# Patient Record
Sex: FEMALE | Race: WHITE | NOT HISPANIC OR LATINO | Employment: FULL TIME | URBAN - NONMETROPOLITAN AREA
[De-identification: names, ages, dates, MRNs, and addresses within clinical notes are randomized per-mention and may not be internally consistent; named-entity substitution may affect disease eponyms.]

---

## 2017-02-23 RX ORDER — TRAZODONE HYDROCHLORIDE 50 MG/1
TABLET ORAL
Qty: 60 TABLET | Refills: 0 | Status: SHIPPED | OUTPATIENT
Start: 2017-02-23 | End: 2017-04-17 | Stop reason: SDUPTHER

## 2017-03-14 RX ORDER — SUMATRIPTAN 50 MG/1
TABLET, FILM COATED ORAL
Qty: 9 TABLET | Refills: 0 | Status: SHIPPED | OUTPATIENT
Start: 2017-03-14 | End: 2017-04-17 | Stop reason: SDUPTHER

## 2017-03-17 RX ORDER — OSELTAMIVIR PHOSPHATE 75 MG/1
75 CAPSULE ORAL DAILY
Qty: 10 CAPSULE | Refills: 0 | Status: SHIPPED | OUTPATIENT
Start: 2017-03-17 | End: 2017-03-27

## 2017-04-17 RX ORDER — SUMATRIPTAN 50 MG/1
TABLET, FILM COATED ORAL
Qty: 9 TABLET | Refills: 0 | Status: SHIPPED | OUTPATIENT
Start: 2017-04-17 | End: 2017-06-18 | Stop reason: SDUPTHER

## 2017-04-17 RX ORDER — TRAZODONE HYDROCHLORIDE 50 MG/1
TABLET ORAL
Qty: 60 TABLET | Refills: 0 | Status: SHIPPED | OUTPATIENT
Start: 2017-04-17 | End: 2017-09-11 | Stop reason: SDUPTHER

## 2017-04-28 ENCOUNTER — OFFICE VISIT (OUTPATIENT)
Dept: FAMILY MEDICINE CLINIC | Facility: CLINIC | Age: 32
End: 2017-04-28

## 2017-04-28 VITALS
OXYGEN SATURATION: 100 % | HEIGHT: 57 IN | DIASTOLIC BLOOD PRESSURE: 60 MMHG | WEIGHT: 134 LBS | TEMPERATURE: 98.6 F | SYSTOLIC BLOOD PRESSURE: 110 MMHG | BODY MASS INDEX: 28.91 KG/M2

## 2017-04-28 DIAGNOSIS — G43.019 INTRACTABLE MIGRAINE WITHOUT AURA AND WITHOUT STATUS MIGRAINOSUS: Primary | ICD-10-CM

## 2017-04-28 DIAGNOSIS — J06.9 UPPER RESPIRATORY INFECTION, ACUTE: ICD-10-CM

## 2017-04-28 PROCEDURE — 99214 OFFICE O/P EST MOD 30 MIN: CPT | Performed by: NURSE PRACTITIONER

## 2017-04-28 RX ORDER — CEFUROXIME AXETIL 500 MG/1
500 TABLET ORAL 2 TIMES DAILY
Qty: 14 TABLET | Refills: 0 | Status: SHIPPED | OUTPATIENT
Start: 2017-04-28 | End: 2017-07-20

## 2017-04-28 NOTE — PROGRESS NOTES
Chief Complaint   Patient presents with   • Follow-up     6 month check up   • Cough   • Nasal Congestion     Subjective   Ellie Sissy Saini is a 31 y.o. female.     Cough   This is a new problem. The current episode started in the past 7 days. The problem has been gradually worsening. The problem occurs constantly. The cough is productive of sputum. Associated symptoms include headaches, myalgias, nasal congestion and postnasal drip. Pertinent negatives include no chest pain, chills, ear congestion, ear pain, eye redness, fever, heartburn, hemoptysis, rash, rhinorrhea, sore throat, shortness of breath, sweats, weight loss or wheezing. Nothing aggravates the symptoms. She has tried OTC cough suppressant for the symptoms. The treatment provided mild relief. There is no history of asthma, bronchiectasis, bronchitis, COPD, environmental allergies or pneumonia.   Migraine    This is a recurrent problem. The current episode started more than 1 year ago. The problem occurs daily. The problem has been rapidly worsening. The pain is located in the bilateral region. The pain does not radiate. The pain quality is not similar to prior headaches. The quality of the pain is described as boring, dull and throbbing. The pain is at a severity of 10/10. The pain is moderate. Associated symptoms include back pain. Pertinent negatives include no abdominal pain, abnormal behavior, anorexia, blurred vision, coughing, dizziness, drainage, ear pain, eye pain, eye redness, eye watering, facial sweating, fever, hearing loss, insomnia, loss of balance, muscle aches, nausea, neck pain, numbness, phonophobia, photophobia, rhinorrhea, scalp tenderness, seizures, sinus pressure, sore throat, swollen glands, tingling, tinnitus, visual change, vomiting, weakness or weight loss. Nothing aggravates the symptoms. She has tried acetaminophen, antidepressants, beta blockers, cold packs, ergotamines, injectable narcotics, NSAIDs, oral narcotics and  triptans for the symptoms. The treatment provided moderate relief.   Back Pain   This is a recurrent problem. The current episode started 1 to 4 weeks ago. The problem occurs constantly. The problem has been gradually worsening since onset. The pain is present in the lumbar spine. The pain is at a severity of 4/10. The pain is moderate. The pain is the same all the time. The symptoms are aggravated by bending. Stiffness is present all day. Associated symptoms include headaches. Pertinent negatives include no abdominal pain, bladder incontinence, bowel incontinence, chest pain, dysuria, fever, leg pain, numbness, paresis, paresthesias, pelvic pain, perianal numbness, tingling, weakness or weight loss. She has tried heat, home exercises, ice and muscle relaxant for the symptoms. The treatment provided mild relief.        The following portions of the patient's history were reviewed and updated as appropriate: allergies, current medications, past social history and problem list.    Review of Systems   Constitutional: Negative.  Negative for chills, fever and weight loss.   HENT: Positive for postnasal drip. Negative for ear pain, hearing loss, rhinorrhea, sinus pressure, sore throat and tinnitus.    Eyes: Negative.  Negative for blurred vision, photophobia, pain and redness.   Respiratory: Negative.  Negative for cough, hemoptysis, shortness of breath and wheezing.    Cardiovascular: Negative.  Negative for chest pain.   Gastrointestinal: Negative.  Negative for abdominal pain, anorexia, bowel incontinence, heartburn, nausea and vomiting.   Endocrine: Negative.    Genitourinary: Negative.  Negative for bladder incontinence, dysuria and pelvic pain.   Musculoskeletal: Positive for arthralgias, back pain, gait problem, joint swelling and myalgias. Negative for neck pain.   Skin: Negative for rash.   Allergic/Immunologic: Negative.  Negative for environmental allergies.   Neurological: Positive for light-headedness and  "headaches. Negative for dizziness, tingling, tremors, seizures, syncope, facial asymmetry, speech difficulty, weakness, numbness, paresthesias and loss of balance.   Hematological: Negative.    Psychiatric/Behavioral: Positive for sleep disturbance. The patient is nervous/anxious. The patient does not have insomnia.        Objective   /60  Temp 98.6 °F (37 °C) (Tympanic)   Ht 57\" (144.8 cm)  Wt 134 lb (60.8 kg)  SpO2 100%  BMI 29 kg/m2  Physical Exam   Constitutional: She is oriented to person, place, and time. She appears well-developed and well-nourished.   HENT:   Head: Normocephalic and atraumatic.   Thick pnd present -mid facial pain and pressure    Eyes: Conjunctivae and EOM are normal. Pupils are equal, round, and reactive to light.   Neck: Normal range of motion. Neck supple.   Cardiovascular: Normal rate, regular rhythm, normal heart sounds, intact distal pulses and normal pulses.  Exam reveals no gallop and no friction rub.    No murmur heard.  Pulmonary/Chest: Effort normal.   Abdominal: Soft. Bowel sounds are normal.   Genitourinary: Rectum normal. Pelvic exam was performed with patient supine. Uterus is not deviated, not enlarged, not fixed and not tender. Cervix exhibits no motion tenderness, no discharge and no friability. Right adnexum displays no mass. Left adnexum displays no mass.   Musculoskeletal: Normal range of motion. She exhibits tenderness.   Neurological: She is alert and oriented to person, place, and time. She has normal reflexes. She displays normal reflexes. No cranial nerve deficit. She exhibits normal muscle tone. Coordination normal.   Skin: Skin is warm and dry.   Psychiatric: She has a normal mood and affect.   Nursing note and vitals reviewed.      Assessment/Plan   Problem List Items Addressed This Visit        Cardiovascular and Mediastinum    Intractable migraine without aura and without status migrainosus - Primary    Relevant Medications    Topiramate ER (TROKENDI " XR) 200 MG capsule sustained-release 24 hr       Respiratory    Upper respiratory infection, acute    Relevant Medications    cefuroxime (CEFTIN) 500 MG tablet           New Medications Ordered This Visit   Medications   • Topiramate ER (TROKENDI XR) 200 MG capsule sustained-release 24 hr     Sig: Take 1 capsule by mouth Daily.     Dispense:  30 capsule     Refill:  11   • cefuroxime (CEFTIN) 500 MG tablet     Sig: Take 1 tablet by mouth 2 (Two) Times a Day.     Dispense:  14 tablet     Refill:  0

## 2017-06-19 RX ORDER — SUMATRIPTAN 50 MG/1
TABLET, FILM COATED ORAL
Qty: 9 TABLET | Refills: 5 | Status: SHIPPED | OUTPATIENT
Start: 2017-06-19 | End: 2018-03-02 | Stop reason: SDUPTHER

## 2017-07-20 ENCOUNTER — OFFICE VISIT (OUTPATIENT)
Dept: FAMILY MEDICINE CLINIC | Facility: CLINIC | Age: 32
End: 2017-07-20

## 2017-07-20 VITALS
WEIGHT: 126 LBS | SYSTOLIC BLOOD PRESSURE: 110 MMHG | DIASTOLIC BLOOD PRESSURE: 80 MMHG | HEIGHT: 57 IN | BODY MASS INDEX: 27.18 KG/M2

## 2017-07-20 DIAGNOSIS — M54.2 MUSCLE PAIN, CERVICAL: ICD-10-CM

## 2017-07-20 DIAGNOSIS — M54.2 NECK PAIN: Primary | ICD-10-CM

## 2017-07-20 DIAGNOSIS — G43.019 INTRACTABLE MIGRAINE WITHOUT AURA AND WITHOUT STATUS MIGRAINOSUS: ICD-10-CM

## 2017-07-20 PROCEDURE — 99213 OFFICE O/P EST LOW 20 MIN: CPT | Performed by: NURSE PRACTITIONER

## 2017-07-20 PROCEDURE — 96372 THER/PROPH/DIAG INJ SC/IM: CPT | Performed by: NURSE PRACTITIONER

## 2017-07-20 RX ORDER — TRIAMCINOLONE ACETONIDE 40 MG/ML
80 INJECTION, SUSPENSION INTRA-ARTICULAR; INTRAMUSCULAR ONCE
Status: COMPLETED | OUTPATIENT
Start: 2017-07-20 | End: 2017-07-20

## 2017-07-20 RX ORDER — BACLOFEN 10 MG/1
10 TABLET ORAL 2 TIMES DAILY
Qty: 90 TABLET | Refills: 5 | Status: SHIPPED | OUTPATIENT
Start: 2017-07-20 | End: 2018-07-02 | Stop reason: SDUPTHER

## 2017-07-20 RX ORDER — METHYLPREDNISOLONE 4 MG/1
TABLET ORAL
Qty: 21 TABLET | Refills: 0 | Status: SHIPPED | OUTPATIENT
Start: 2017-07-20 | End: 2017-08-25

## 2017-07-20 RX ADMIN — TRIAMCINOLONE ACETONIDE 80 MG: 40 INJECTION, SUSPENSION INTRA-ARTICULAR; INTRAMUSCULAR at 15:21

## 2017-07-20 NOTE — PROGRESS NOTES
Chief Complaint   Patient presents with   • Arm Pain     bilat arm pain x 1 month    • Back Pain     neck pain after stopping meds for a bit     Subjective   Ellie Saini is a 31 y.o. female.     Cough   This is a new problem. The current episode started in the past 7 days. The problem has been gradually worsening. The problem occurs constantly. The cough is productive of sputum. Associated symptoms include headaches, myalgias, nasal congestion and postnasal drip. Pertinent negatives include no chest pain, chills, ear congestion, ear pain, eye redness, fever, heartburn, hemoptysis, rash, rhinorrhea, sore throat, shortness of breath, sweats, weight loss or wheezing. Nothing aggravates the symptoms. She has tried OTC cough suppressant for the symptoms. The treatment provided mild relief. There is no history of asthma, bronchiectasis, bronchitis, COPD, environmental allergies or pneumonia.   Migraine    This is a recurrent problem. The current episode started more than 1 year ago. The problem occurs daily. The problem has been rapidly worsening. The pain is located in the bilateral region. The pain does not radiate. The pain quality is not similar to prior headaches. The quality of the pain is described as boring, dull and throbbing. The pain is at a severity of 10/10. The pain is moderate. Associated symptoms include back pain, neck pain, numbness and tingling. Pertinent negatives include no abdominal pain, abnormal behavior, anorexia, blurred vision, coughing, dizziness, drainage, ear pain, eye pain, eye redness, eye watering, facial sweating, fever, hearing loss, insomnia, loss of balance, muscle aches, nausea, phonophobia, photophobia, rhinorrhea, scalp tenderness, seizures, sinus pressure, sore throat, swollen glands, tinnitus, visual change, vomiting, weakness or weight loss. Nothing aggravates the symptoms. She has tried acetaminophen, antidepressants, beta blockers, cold packs, ergotamines, injectable  narcotics, NSAIDs, oral narcotics and triptans for the symptoms. The treatment provided moderate relief.   Back Pain   This is a recurrent problem. The current episode started 1 to 4 weeks ago. The problem occurs constantly. The problem has been gradually worsening since onset. The pain is present in the lumbar spine. The pain is at a severity of 4/10. The pain is moderate. The pain is the same all the time. The symptoms are aggravated by bending. Stiffness is present all day. Associated symptoms include dysuria, headaches, numbness, paresis, paresthesias and tingling. Pertinent negatives include no abdominal pain, bladder incontinence, bowel incontinence, chest pain, fever, leg pain, pelvic pain, perianal numbness, weakness or weight loss. She has tried heat, home exercises, ice and muscle relaxant for the symptoms. The treatment provided mild relief.        The following portions of the patient's history were reviewed and updated as appropriate: allergies, current medications, past social history and problem list.    Review of Systems   Constitutional: Negative.  Negative for chills, fever and weight loss.   HENT: Positive for postnasal drip. Negative for ear pain, hearing loss, rhinorrhea, sinus pressure, sore throat and tinnitus.    Eyes: Negative.  Negative for blurred vision, photophobia, pain and redness.   Respiratory: Negative.  Negative for cough, hemoptysis, shortness of breath and wheezing.    Cardiovascular: Negative.  Negative for chest pain, palpitations and leg swelling.   Gastrointestinal: Negative.  Negative for abdominal distention, abdominal pain, anorexia, bowel incontinence, heartburn, nausea and vomiting.   Endocrine: Negative.    Genitourinary: Positive for dysuria. Negative for bladder incontinence and pelvic pain.   Musculoskeletal: Positive for arthralgias, back pain, gait problem, joint swelling, myalgias, neck pain and neck stiffness.   Skin: Negative.  Negative for rash.  "  Allergic/Immunologic: Negative.  Negative for environmental allergies.   Neurological: Positive for tingling, light-headedness, numbness, headaches and paresthesias. Negative for dizziness, tremors, seizures, syncope, facial asymmetry, speech difficulty, weakness and loss of balance.   Hematological: Negative.    Psychiatric/Behavioral: Positive for sleep disturbance. The patient is nervous/anxious. The patient does not have insomnia.        Objective   /80  Ht 57\" (144.8 cm)  Wt 126 lb (57.2 kg)  BMI 27.27 kg/m2  Physical Exam   Constitutional: She is oriented to person, place, and time. She appears well-developed and well-nourished.   HENT:   Head: Normocephalic and atraumatic.   Thick pnd present -mid facial pain and pressure    Eyes: Conjunctivae and EOM are normal. Pupils are equal, round, and reactive to light.   Neck: Normal range of motion. Neck supple.   Cardiovascular: Normal rate, regular rhythm, normal heart sounds, intact distal pulses and normal pulses.  Exam reveals no gallop and no friction rub.    No murmur heard.  Pulmonary/Chest: Effort normal.   Abdominal: Soft. Bowel sounds are normal.   Genitourinary: Rectum normal. Pelvic exam was performed with patient supine. Uterus is not deviated, not enlarged, not fixed and not tender. Cervix exhibits no motion tenderness, no discharge and no friability. Right adnexum displays no mass. Left adnexum displays no mass.   Musculoskeletal: She exhibits tenderness.        Cervical back: She exhibits decreased range of motion, tenderness, bony tenderness, pain and spasm. She exhibits no swelling, no edema, no deformity, no laceration and normal pulse.        Back:    Neurological: She is alert and oriented to person, place, and time. She has normal reflexes. She displays normal reflexes. No cranial nerve deficit. She exhibits normal muscle tone. Coordination normal.   Skin: Skin is warm and dry.   Psychiatric: She has a normal mood and affect. "   Nursing note and vitals reviewed.     PROCEDURE: XR SPINE CERVICAL 2 OR 3 VW    VIEWS: 3    INDICATION:  Neck pain    COMPARISON: None    FINDINGS:    A cervical spine plain film examination was performed consisting   of five radiographic views.    There is no evidence of a fracture fracture or prevertebral soft  tissue swelling. Mild reversal of the normal lordotic curve is  present.     Intervertebral disc spaces are preserved. Facets are normally  aligned.             Impression             CONCLUSION:  Mild reversal of normal lordotic curve, likely  related to muscle spasm or positioning. Otherwise, grossly  negative examination for age.    (Please note:  If pain or symptoms persist beyond reasonable   expectations, a follow-up MRI examination is suggested, as is   deemed clinically appropriate.      Electronically signed by:  Vira Cornelius MD  7/20/2017 4:30 PM CDT  Workstation: [id number]              Specimen Collected: 07/20/17  3:41 PM Last Resulted           Assessment/Plan   Problem List Items Addressed This Visit        Cardiovascular and Mediastinum    Intractable migraine without aura and without status migrainosus    Relevant Medications    diclofenac (VOLTAREN) 50 MG EC tablet    baclofen (LIORESAL) 10 MG tablet       Nervous and Auditory    Neck pain - Primary    Relevant Medications    triamcinolone acetonide (KENALOG-40) injection 80 mg (Completed)    Other Relevant Orders    Ambulatory Referral to Physical Therapy    XR Spine Cervical 2 or 3 View (Completed)      Other Visit Diagnoses     Muscle pain, cervical               New Medications Ordered This Visit   Medications   • MethylPREDNISolone (MEDROL, ALFONSO,) 4 MG tablet     Sig: Take as directed on package instructions.     Dispense:  21 tablet     Refill:  0   • diclofenac (VOLTAREN) 50 MG EC tablet     Sig: Take 1 tablet by mouth 2 (Two) Times a Day.     Dispense:  60 tablet     Refill:  5   • baclofen (LIORESAL) 10 MG tablet     Sig: Take  1 tablet by mouth 2 (Two) Times a Day.     Dispense:  90 tablet     Refill:  5   • triamcinolone acetonide (KENALOG-40) injection 80 mg      refer to pt, meds as directed, diet discussed    It's not just what you eat, but when you eat  Eat breakfast, and eat smaller meals throughout the day. A healthy breakfast can jumpstart your metabolism, while eating small, healthy meals (rather than the standard three large meals) keeps your energy up.   Avoid eating at night. Try to eat dinner earlier and fast for 14-16 hours until breakfast the next morning. Studies suggest that eating only when you’re most active and giving your digestive system a long break each day may help to regulate weight.

## 2017-07-31 ENCOUNTER — HOSPITAL ENCOUNTER (OUTPATIENT)
Dept: PHYSICAL THERAPY | Facility: HOSPITAL | Age: 32
Setting detail: THERAPIES SERIES
Discharge: HOME | End: 2017-07-31

## 2017-07-31 DIAGNOSIS — M54.2 NECK PAIN: Primary | ICD-10-CM

## 2017-07-31 PROCEDURE — 97161 PT EVAL LOW COMPLEX 20 MIN: CPT | Performed by: PHYSICAL THERAPIST

## 2017-08-08 ENCOUNTER — HOSPITAL ENCOUNTER (OUTPATIENT)
Dept: PHYSICAL THERAPY | Facility: HOSPITAL | Age: 32
Setting detail: THERAPIES SERIES
Discharge: HOME | End: 2017-08-08

## 2017-08-08 DIAGNOSIS — M54.2 NECK PAIN: Primary | ICD-10-CM

## 2017-08-08 PROCEDURE — 97140 MANUAL THERAPY 1/> REGIONS: CPT

## 2017-08-08 PROCEDURE — 97110 THERAPEUTIC EXERCISES: CPT

## 2017-08-08 PROCEDURE — G0283 ELEC STIM OTHER THAN WOUND: HCPCS

## 2017-08-08 NOTE — PROGRESS NOTES
Outpatient Physical Therapy Ortho Treatment Note   Jessy Soham     Patient Name: Ellie Saini  : 1985  MRN: [id number]  Today's Date: 2017      Visit Date: 2017    Subjective Improvement:     0%  Attendance: 2/  Approved:    8 visits ( thru )       MD follow up:   TBD         date:  17       Visit Dx:    ICD-10-CM ICD-9-CM   1. Neck pain M54.2 723.1       Patient Active Problem List   Diagnosis   • Bilateral low back pain with right-sided sciatica   • Intractable migraine without aura and without status migrainosus   • Upper respiratory infection, acute   • Neck pain        Past Medical History:   Diagnosis Date   • Acute bronchitis    • Acute maxillary sinusitis    • Acute otitis media    • Acute pharyngitis    • Allergic rhinitis    • Breast lump    • Cramp in limb     Cramp in lower limb - bilateral      • Hormone replacement therapy (HRT)     H/O: hormone replacement (HRT)   • Insomnia    • Lymphadenopathy    • Malaise     Other malaise   • Migraine    • Postoperative visit    • Upper respiratory infection         Past Surgical History:   Procedure Laterality Date   •  SECTION      x2   • COLONOSCOPY  2008   • DIAGNOSTIC LAPAROSCOPY  2014   • LAPAROSCOPIC CHOLECYSTECTOMY  2007   • PAP SMEAR  2009   • PROCEDURE GENERIC CONVERTED  2013    REMOVE INTRAUTERINE DEVICE    • TOTAL ABDOMINAL HYSTERECTOMY WITH SALPINGO OOPHORECTOMY  2014             PT Ortho       17 1100    Subjective Comments    Subjective Comments Pt reports burning pain in neck & numbness L UE.  Pain gets worse with sitting or lying down.  -TM    Precautions and Contraindications    Precautions/Limitations no known precautions/limitations  -TM    Precautions Monitor BP PRN  -TM    Subjective Pain    Able to rate subjective pain? yes  -TM    Pre-Treatment Pain Level 4  -TM    Posture/Observations    Posture/Observations Comments Pt presents with  forward head & rounded shoulders.  -TM    Shoulder Girdle Palpation    Upper Trap Left:;Trigger point;Guarded/taut  -TM    Levator Scapula Bilateral:;Tender  -TM      User Key  (r) = Recorded By, (t) = Taken By, (c) = Cosigned By    Initials Name Provider Type     Mariana Tejada PTA Physical Therapy Assistant                            PT Assessment/Plan       08/08/17 1100       PT Assessment    Functional Limitations Performance in leisure activities;Performance in self-care ADL;Limitations in functional capacity and performance  -TM     Impairments Impaired muscle endurance;Muscle strength;Pain;Peripheral nerve integrity;Range of motion;Posture;Sensation  -TM     Assessment Comments Pt tolerated manual RX better today.  Some report of decreased radiculopathy in L UE during distraction of CS.  -TM     Rehab Potential Good  -TM     Patient/caregiver participated in establishment of treatment plan and goals Yes  -TM     Patient would benefit from skilled therapy intervention Yes  -TM     PT Plan    PT Frequency 2x/week  -TM     Predicted Duration of Therapy Intervention (days/wks) 3-4 weeks  -TM     PT Plan Comments scap squeezes, no money  -TM       User Key  (r) = Recorded By, (t) = Taken By, (c) = Cosigned By    Initials Name Provider Type     Mariana Tejada PTA Physical Therapy Assistant                Modalities       08/08/17 1100          Moist Heat    MH Applied Yes  -TM      Location cervical  -TM      Rx Minutes Other:   20  -TM      MH S/P Rx Yes  -TM      ELECTRICAL STIMULATION    Attended/Unattended Unattended  -TM      Stimulation Type IFC  -TM      Max mAmp 14  -TM      Location/Electrode Placement/Other cervical w/MHP  -TM      Rx Minutes 20 mins  -TM        User Key  (r) = Recorded By, (t) = Taken By, (c) = Cosigned By    Initials Name Provider Type     Mariana Tejada PTA Physical Therapy Assistant                Exercises       08/08/17 1100          Subjective Comments     Subjective Comments Pt reports burning pain in neck & numbness L UE.  Pain gets worse with sitting or lying down.  -TM      Subjective Pain    Able to rate subjective pain? yes  -TM      Pre-Treatment Pain Level 4  -TM      Exercise 1    Exercise Name 1 chin tucks  -TM      Reps 1 15  -TM      Time (Seconds) 1 5  -TM      Exercise 2    Exercise Name 2 swim glides  -TM      Reps 2 15  -TM      Exercise 3    Exercise Name 3 AROM CS rotation  -TM      Reps 3 10  -TM      Exercise 4    Exercise Name 4 supine wand AAROM shoulder flex  -TM      Reps 4 15  -TM      Exercise 5    Exercise Name 5 supine wand chest press  -TM      Reps 5 15  -TM        User Key  (r) = Recorded By, (t) = Taken By, (c) = Cosigned By    Initials Name Provider Type    TM Mariana Tejada PTA Physical Therapy Assistant                        Manual Rx (last 36 hours)      Manual Treatments       08/08/17 1100          Manual Rx 1    Manual Rx 1 Location bilateral UT/levator  -TM      Manual Rx 1 Type STM  -TM      Manual Rx 1 Duration 5 min  -TM      Manual Rx 2    Manual Rx 2 Location cervical  -TM      Manual Rx 2 Type distraction, suboccipital release  -TM      Manual Rx 2 Duration 8 min  -TM        User Key  (r) = Recorded By, (t) = Taken By, (c) = Cosigned By    Initials Name Provider Type    TM Mariana Tejada PTA Physical Therapy Assistant                PT OP Goals       08/08/17 1100       PT Short Term Goals    STG Date to Achieve 08/21/17  -TM     STG 1 Independent in HEP   -TM     STG 1 Progress Ongoing  -TM     STG 2 No pain with AROM of c-spine   -TM     STG 2 Progress Ongoing  -TM     STG 3 Centralize radicular symptoms   -TM     STG 3 Progress Ongoing  -TM     STG 4 NDI 45% or better  -TM     STG 4 Progress Ongoing  -TM       User Key  (r) = Recorded By, (t) = Taken By, (c) = Cosigned By    Initials Name Provider Type    TM Mariana Tejada PTA Physical Therapy Assistant                Therapy Education       08/08/17  1100          Therapy Education    Education Details HEP: chin tucks, swim glides  -TM      Given HEP  -TM      Program New  -TM      How Provided Demonstration;Written  -TM      Provided to Patient  -TM      Level of Understanding Teach back education performed  -TM        User Key  (r) = Recorded By, (t) = Taken By, (c) = Cosigned By    Initials Name Provider Type    TM Mariana Tejada PTA Physical Therapy Assistant                Time Calculation:   Start Time: 1100  Stop Time: 1158  Time Calculation (min): 58 min  Total Timed Code Minutes- PT: 38 minute(s)    Therapy Charges for Today     Code Description Service Date Service Provider Modifiers Qty    05546665068 HC PT MANUAL THERAPY EA 15 MIN 8/8/2017 Mariana Tejada, PTA GP 1    42929475584 HC PT THER PROC EA 15 MIN 8/8/2017 Mariana Tejada, MEDINA GP 2    [id number] HC PT ELECTRICAL STIM UNATTENDED 8/8/2017 Mariana Tejada, PTA  1     PA TENS SUPPL 2 LEAD PER MONTH 8/8/2017 Mariana Tejada PTA  1                    Mariana Tejada PTA  8/8/2017

## 2017-08-15 ENCOUNTER — HOSPITAL ENCOUNTER (OUTPATIENT)
Dept: PHYSICAL THERAPY | Facility: HOSPITAL | Age: 32
Setting detail: THERAPIES SERIES
Discharge: HOME | End: 2017-08-15

## 2017-08-15 DIAGNOSIS — M54.2 NECK PAIN: Primary | ICD-10-CM

## 2017-08-15 PROCEDURE — G0283 ELEC STIM OTHER THAN WOUND: HCPCS

## 2017-08-15 PROCEDURE — 97110 THERAPEUTIC EXERCISES: CPT

## 2017-08-15 PROCEDURE — 97140 MANUAL THERAPY 1/> REGIONS: CPT

## 2017-08-15 NOTE — PROGRESS NOTES
Outpatient Physical Therapy Ortho Treatment Note   Soham Jim[facility]nez     Patient Name: Ellie Saini  : 1985  MRN: [id number]  Today's Date: 8/15/2017      Visit Date: 08/15/2017    Subjective Improvement:  0%     Attendance: 3/3  Approved:    8 visits ( thru )       MD follow up:   TBD         date:   17      Visit Dx:    ICD-10-CM ICD-9-CM   1. Neck pain M54.2 723.1       Patient Active Problem List   Diagnosis   • Bilateral low back pain with right-sided sciatica   • Intractable migraine without aura and without status migrainosus   • Upper respiratory infection, acute   • Neck pain        Past Medical History:   Diagnosis Date   • Acute bronchitis    • Acute maxillary sinusitis    • Acute otitis media    • Acute pharyngitis    • Allergic rhinitis    • Breast lump    • Cramp in limb     Cramp in lower limb - bilateral      • Hormone replacement therapy (HRT)     H/O: hormone replacement (HRT)   • Insomnia    • Lymphadenopathy    • Malaise     Other malaise   • Migraine    • Postoperative visit    • Upper respiratory infection         Past Surgical History:   Procedure Laterality Date   •  SECTION      x2   • COLONOSCOPY  2008   • DIAGNOSTIC LAPAROSCOPY  2014   • LAPAROSCOPIC CHOLECYSTECTOMY  2007   • PAP SMEAR  2009   • PROCEDURE GENERIC CONVERTED  2013    REMOVE INTRAUTERINE DEVICE    • TOTAL ABDOMINAL HYSTERECTOMY WITH SALPINGO OOPHORECTOMY  2014             PT Ortho       08/15/17 1000    Precautions and Contraindications    Precautions/Limitations no known precautions/limitations  -TM    Precautions Monitor BP PRN  -TM    Cervical Palpation    Cervical Palpation- Location? Occiput;Suboccipital;Spinous process;Upper traps  -TM    Occiput Bilateral:;Tender  -TM    Suboccipital Bilateral:;Tender;Guarded/taut  -TM    Spinous Process Bilateral:;Tender  -TM    Upper Traps Bilateral:;Trigger point  -TM      User Key  (r) = Recorded  By, (t) = Taken By, (c) = Cosigned By    Initials Name Provider Type     Mariana Tejada PTA Physical Therapy Assistant                            PT Assessment/Plan       08/15/17 1000       PT Assessment    Functional Limitations Performance in leisure activities;Performance in self-care ADL;Limitations in functional capacity and performance  -TM     Impairments Impaired muscle endurance;Muscle strength;Pain;Peripheral nerve integrity;Range of motion;Posture;Sensation  -TM     Assessment Comments Pt didn't tolerate manual RX well today.  Very tender at spinous processes C3-7.  Pt c/o increased radicular symptoms with A/P glides CS.  -TM     Rehab Potential Good  -TM     Patient/caregiver participated in establishment of treatment plan and goals Yes  -TM     Patient would benefit from skilled therapy intervention Yes  -TM     PT Plan    PT Frequency 2x/week  -TM     Predicted Duration of Therapy Intervention (days/wks) 3-4 weeks  -TM     PT Plan Comments Cont as tolerated.  -TM       User Key  (r) = Recorded By, (t) = Taken By, (c) = Cosigned By    Initials Name Provider Type     Mariana Tejada PTA Physical Therapy Assistant                Modalities       08/15/17 1000          Moist Heat    MH Applied Yes  -TM      Location cervical  -TM      Rx Minutes Other:   20 min  -TM      MH S/P Rx Yes  -TM      ELECTRICAL STIMULATION    Attended/Unattended Unattended  -TM      Stimulation Type IFC  -TM      Max mAmp 11  -TM      Location/Electrode Placement/Other cervical w/MHP  -TM      Rx Minutes 20 mins  -TM        User Key  (r) = Recorded By, (t) = Taken By, (c) = Cosigned By    Initials Name Provider Type     Mariana Tejada PTA Physical Therapy Assistant                Exercises       08/15/17 1000          Subjective Comments    Subjective Comments Pt reports numbenss in L UE & R side of face intermittantly.  -TM      Subjective Pain    Able to rate subjective pain? yes  -TM      Pre-Treatment  Pain Level 3  -TM      Exercise 1    Exercise Name 1 UT stretch  -TM      Reps 1 2  -TM      Time (Seconds) 1 30  -TM      Exercise 2    Exercise Name 2 chin tucks  -TM      Sets 2 2  -TM      Reps 2 10  -TM      Exercise 3    Exercise Name 3 swim glides  -TM      Sets 3 1  -TM      Reps 3 15  -TM      Exercise 4    Exercise Name 4 scap squeezes  -TM      Sets 4 2  -TM      Reps 4 10  -TM      Exercise 5    Exercise Name 5 supine wand shoulder flex  -TM      Sets 5 2  -TM      Reps 5 10  -TM        User Key  (r) = Recorded By, (t) = Taken By, (c) = Cosigned By    Initials Name Provider Type    TM Mariana Tejada PTA Physical Therapy Assistant                        Manual Rx (last 36 hours)      Manual Treatments       08/15/17 0900          Manual Rx 1    Manual Rx 1 Location bilateral UT/levator  -TM      Manual Rx 1 Type STM  -TM      Manual Rx 1 Duration 5 min  -TM      Manual Rx 2    Manual Rx 2 Location cervical  -TM      Manual Rx 2 Type distraction, suboccipital release, A/P glides  -TM      Manual Rx 2 Duration 5min  -TM        User Key  (r) = Recorded By, (t) = Taken By, (c) = Cosigned By    Initials Name Provider Type    TM Mariana Tejada PTA Physical Therapy Assistant                PT OP Goals       08/15/17 1000       PT Short Term Goals    STG Date to Achieve 08/21/17  -TM     STG 1 Independent in HEP   -TM     STG 1 Progress Ongoing  -TM     STG 2 No pain with AROM of c-spine   -TM     STG 2 Progress Ongoing  -TM     STG 3 Centralize radicular symptoms   -TM     STG 3 Progress Ongoing  -TM     STG 4 NDI 45% or better  -TM     STG 4 Progress Ongoing  -TM       User Key  (r) = Recorded By, (t) = Taken By, (c) = Cosigned By    Initials Name Provider Type    TM Mariana Tejada PTA Physical Therapy Assistant                    Time Calculation:   Start Time: 1012  Stop Time: 1112  Time Calculation (min): 60 min  Total Timed Code Minutes- PT: 40 minute(s)    Therapy Charges for Today     Code  Description Service Date Service Provider Modifiers Qty    19464181229 HC PT ELECTRICAL STIM UNATTENDED 8/15/2017 Mariana Tejada, PTA  1    41246495505 HC PT MANUAL THERAPY EA 15 MIN 8/15/2017 Mariana Tejada, PTA GP 1    52540926962 HC PT THER PROC EA 15 MIN 8/15/2017 Mariana Tejada, PTA GP 2                    Mariana Tejada, PTA  8/15/2017

## 2017-08-24 ENCOUNTER — HOSPITAL ENCOUNTER (OUTPATIENT)
Dept: PHYSICAL THERAPY | Facility: HOSPITAL | Age: 32
Setting detail: THERAPIES SERIES
Discharge: HOME | End: 2017-08-24

## 2017-08-24 DIAGNOSIS — M54.2 NECK PAIN: Primary | ICD-10-CM

## 2017-08-24 PROCEDURE — 97140 MANUAL THERAPY 1/> REGIONS: CPT | Performed by: PHYSICAL THERAPIST

## 2017-08-24 PROCEDURE — 97110 THERAPEUTIC EXERCISES: CPT

## 2017-08-24 NOTE — PROGRESS NOTES
Outpatient Physical Therapy Ortho Progress Note  [facility]     Patient Name: Ellie Saini  : 1985  MRN: [id number]  Today's Date: 2017      Visit Date: 2017  Attendance  8 approved exp   Move exp date for continued PT. No MD follow up.  No improvement.    Visit Dx:    ICD-10-CM ICD-9-CM   1. Neck pain M54.2 723.1       Patient Active Problem List   Diagnosis   • Bilateral low back pain with right-sided sciatica   • Intractable migraine without aura and without status migrainosus   • Upper respiratory infection, acute   • Neck pain        Past Medical History:   Diagnosis Date   • Acute bronchitis    • Acute maxillary sinusitis    • Acute otitis media    • Acute pharyngitis    • Allergic rhinitis    • Breast lump    • Cramp in limb     Cramp in lower limb - bilateral      • Hormone replacement therapy (HRT)     H/O: hormone replacement (HRT)   • Insomnia    • Lymphadenopathy    • Malaise     Other malaise   • Migraine    • Postoperative visit    • Upper respiratory infection         Past Surgical History:   Procedure Laterality Date   •  SECTION      x2   • COLONOSCOPY  2008   • DIAGNOSTIC LAPAROSCOPY  2014   • LAPAROSCOPIC CHOLECYSTECTOMY  2007   • PAP SMEAR  2009   • PROCEDURE GENERIC CONVERTED  2013    REMOVE INTRAUTERINE DEVICE    • TOTAL ABDOMINAL HYSTERECTOMY WITH SALPINGO OOPHORECTOMY  2014             PT Ortho       17 1300    Subjective Comments    Subjective Comments Pt reports symptoms getting worse past week. Has neckk pain, headache & numbness in B arms.    -TM    Precautions and Contraindications    Precautions/Limitations no known precautions/limitations  -TM    Precautions Monitor BP PRN  -TM    Subjective Pain    Able to rate subjective pain? yes  -TM    Pre-Treatment Pain Level 8  -TM      User Key  (r) = Recorded By, (t) = Taken By, (c) = Cosigned By    Initials Name Provider Type    TM Mariana Downey  MEDINA Tejada Physical Therapy Assistant        Painful cervical motion in all planes.  Reactive to palpation in cervical and thoraco/scapular region. Does not tolerate resisitence to MMT UE strength.  Continues to report numbness.                    PT Assessment/Plan       08/24/17 1428 08/24/17 1300    PT Assessment    Functional Limitations Limitation in home management;Performance in self-care ADL;Limitations in functional capacity and performance  -DD Performance in leisure activities;Performance in self-care ADL;Limitations in functional capacity and performance  -TM    Impairments  Impaired muscle endurance;Muscle strength;Pain;Peripheral nerve integrity;Range of motion;Posture;Sensation  -TM    Assessment Comments Patient too reactive for manual stretchning and MFR.  Will alter treatment plan for the next couple of treatments  -DD Pt's symptoms are getting worse.  Very reactive to touch of cervical spine.  -TM    Please refer to paper survey for additional self-reported information No  -DD     Rehab Potential Good  -DD Good  -TM    Patient/caregiver participated in establishment of treatment plan and goals Yes  -DD Yes  -TM    Patient would benefit from skilled therapy intervention Yes  -DD Yes  -TM    PT Plan    PT Frequency 2x/week  -DD 2x/week  -TM    Predicted Duration of Therapy Intervention (days/wks) 3 weeks  -DD 3-4 weeks  -TM    PT Plan Comments Continue to schedule for TPDN as therapist available.  Patient not available on Thursday next week.  Trial of cervical traction 10 mins.  Neural tension glides and stretches.  Postural education  ICE  -DD       User Key  (r) = Recorded By, (t) = Taken By, (c) = Cosigned By    Initials Name Provider Type    TM Mariana Tejada PTA Physical Therapy Assistant    DD Maryuri Noguera, PT Physical Therapist                    Exercises       08/24/17 1300          Subjective Comments    Subjective Comments Pt reports symptoms getting worse past week. Has  neckk pain, headache & numbness in B arms.    -TM      Subjective Pain    Able to rate subjective pain? yes  -TM      Pre-Treatment Pain Level 8  -TM      Exercise 1    Exercise Name 1 UT stretch  -TM      Reps 1 2  -TM      Time (Seconds) 1 30  -TM      Exercise 2    Exercise Name 2 swim glides  -TM      Sets 2 1  -TM      Reps 2 15  -TM      Exercise 3    Exercise Name 3 chin tucks  -TM      Sets 3 2  -TM      Reps 3 10  -TM      Exercise 4    Exercise Name 4 scap squeezes  -TM      Sets 4 2  -TM      Reps 4 10  -TM      Exercise 5    Exercise Name 5 CS isometric flex, SB with ball at wall  -TM      Sets 5 1  -TM      Reps 5 10  -TM      Time (Seconds) 5 5  -TM      Exercise 6    Exercise Name 6 supine wand shoulder flex  -TM      Sets 6 2  -TM      Reps 6 10  -TM        User Key  (r) = Recorded By, (t) = Taken By, (c) = Cosigned By    Initials Name Provider Type    TM Mariana Tejada, PTA Physical Therapy Assistant                        Manual Rx (last 36 hours)      Manual Treatments       08/24/17 1300          Manual Rx 1    Manual Rx 1 Location Cervical/ Upper thoracic paraspinals  -DD      Manual Rx 1 Type TPDN  -DD      Manual Rx 1 Duration 12  -DD      Manual Rx 2    Manual Rx 2 Location bilateral scapulae  -DD      Manual Rx 2 Type TPDN  -DD      Manual Rx 2 Duration 10  -DD      Manual Rx 3    Manual Rx 3 Location scapular glides/ AP thoracic  -DD      Manual Rx 3 Duration 2  -DD        User Key  (r) = Recorded By, (t) = Taken By, (c) = Cosigned By    Initials Name Provider Type    DD Maryuri Noguera, PT Physical Therapist                PT OP Goals       08/24/17 1738 08/24/17 1300    PT Short Term Goals    STG Date to Achieve  08/21/17  -DD    STG 1  Independent in HEP   -DD    STG 1 Progress  Ongoing  -DD    STG 2  No pain with AROM of c-spine   -DD    STG 2 Progress  Ongoing  -DD    STG 3  Centralize radicular symptoms   -DD    STG 3 Progress  Ongoing  -DD    STG 4  NDI 45% or better  -DD     STG 4 Progress  Ongoing  -DD    STG 5  Patient to report decreased HA  -DD    Time Calculation    PT Goal Re-Cert Due Date 09/14/17  -DD       User Key  (r) = Recorded By, (t) = Taken By, (c) = Cosigned By    Initials Name Provider Type    DD Maryuri Noguera, PT Physical Therapist                    Outcome Measures       08/24/17 1300          Neck Disability Index    Section 1 - Pain Intensity 3  -TM      Section 2 - Personal Care 1  -TM      Section 3 - Lifting 3  -TM      Section 4 - Work 4  -TM      Section 5 - Headaches 5  -TM      Section 6 - Concentration 3  -TM      Section 7 - Sleeping 3  -TM      Section 8 - Driving 3  -TM      Section 9 - Reading 4  -TM      Section 10 - Recreation 4  -TM      Neck Disability Index Score 33  -TM        User Key  (r) = Recorded By, (t) = Taken By, (c) = Cosigned By    Initials Name Provider Type    TM Mariana Tejada, MEDINA Physical Therapy Assistant            Time Calculation:   Start Time: 1300  Stop Time: 1358  Time Calculation (min): 56 min  Total Timed Code Minutes- PT: 55 minute(s)    Therapy Charges for Today     Code Description Service Date Service Provider Modifiers Qty    73619967453 HC PT MANUAL THERAPY EA 15 MIN 8/24/2017 Maryuri Noguera, PT GP 2        Mariana Tejada PTA for exercise 2 units.            Maryuri Noguera, PT  8/24/2017

## 2017-08-25 ENCOUNTER — OFFICE VISIT (OUTPATIENT)
Dept: FAMILY MEDICINE CLINIC | Facility: CLINIC | Age: 32
End: 2017-08-25

## 2017-08-25 ENCOUNTER — APPOINTMENT (OUTPATIENT)
Dept: LAB | Facility: HOSPITAL | Age: 32
End: 2017-08-25

## 2017-08-25 VITALS
DIASTOLIC BLOOD PRESSURE: 70 MMHG | SYSTOLIC BLOOD PRESSURE: 110 MMHG | BODY MASS INDEX: 27.83 KG/M2 | HEIGHT: 57 IN | WEIGHT: 129 LBS

## 2017-08-25 DIAGNOSIS — N39.0 URINARY TRACT INFECTION, SITE UNSPECIFIED: Primary | ICD-10-CM

## 2017-08-25 DIAGNOSIS — Z00.00 GENERAL MEDICAL EXAM: ICD-10-CM

## 2017-08-25 DIAGNOSIS — R53.81 MALAISE: ICD-10-CM

## 2017-08-25 DIAGNOSIS — F41.9 ANXIETY: ICD-10-CM

## 2017-08-25 LAB
25(OH)D3 SERPL-MCNC: 31.7 NG/ML (ref 30–100)
ALBUMIN SERPL-MCNC: 4.6 G/DL (ref 3.4–4.8)
ALBUMIN/GLOB SERPL: 1.6 G/DL (ref 1.1–1.8)
ALP SERPL-CCNC: 44 U/L (ref 38–126)
ALT SERPL W P-5'-P-CCNC: 43 U/L (ref 9–52)
ANION GAP SERPL CALCULATED.3IONS-SCNC: 11 MMOL/L (ref 5–15)
AST SERPL-CCNC: 28 U/L (ref 14–36)
BASOPHILS # BLD AUTO: 0.03 10*3/MM3 (ref 0–0.2)
BASOPHILS NFR BLD AUTO: 0.4 % (ref 0–2)
BILIRUB BLD-MCNC: NEGATIVE MG/DL
BILIRUB SERPL-MCNC: 0.5 MG/DL (ref 0.2–1.3)
BUN BLD-MCNC: 22 MG/DL (ref 7–21)
BUN/CREAT SERPL: 33.8 (ref 7–25)
CALCIUM SPEC-SCNC: 9.4 MG/DL (ref 8.4–10.2)
CHLORIDE SERPL-SCNC: 107 MMOL/L (ref 95–110)
CLARITY, POC: CLEAR
CO2 SERPL-SCNC: 22 MMOL/L (ref 22–31)
COLOR UR: NORMAL
CREAT BLD-MCNC: 0.65 MG/DL (ref 0.5–1)
DEPRECATED RDW RBC AUTO: 44.2 FL (ref 36.4–46.3)
EOSINOPHIL # BLD AUTO: 0.13 10*3/MM3 (ref 0–0.7)
EOSINOPHIL NFR BLD AUTO: 1.6 % (ref 0–7)
ERYTHROCYTE [DISTWIDTH] IN BLOOD BY AUTOMATED COUNT: 13.7 % (ref 11.5–14.5)
GFR SERPL CREATININE-BSD FRML MDRD: 106 ML/MIN/1.73 (ref 64–149)
GLOBULIN UR ELPH-MCNC: 2.8 GM/DL (ref 2.3–3.5)
GLUCOSE BLD-MCNC: 92 MG/DL (ref 60–100)
GLUCOSE UR STRIP-MCNC: NEGATIVE MG/DL
HBA1C MFR BLD: 5.2 % (ref 4–5.6)
HCT VFR BLD AUTO: 38.8 % (ref 35–45)
HGB BLD-MCNC: 13.3 G/DL (ref 12–15.5)
IMM GRANULOCYTES # BLD: 0.02 10*3/MM3 (ref 0–0.02)
IMM GRANULOCYTES NFR BLD: 0.2 % (ref 0–0.5)
IRON 24H UR-MRATE: 44 MCG/DL (ref 37–170)
KETONES UR QL: NEGATIVE
LEUKOCYTE EST, POC: NEGATIVE
LYMPHOCYTES # BLD AUTO: 2.2 10*3/MM3 (ref 0.6–4.2)
LYMPHOCYTES NFR BLD AUTO: 27.1 % (ref 10–50)
MAGNESIUM SERPL-MCNC: 2.1 MG/DL (ref 1.6–2.3)
MCH RBC QN AUTO: 29.8 PG (ref 26.5–34)
MCHC RBC AUTO-ENTMCNC: 34.3 G/DL (ref 31.4–36)
MCV RBC AUTO: 87 FL (ref 80–98)
MONOCYTES # BLD AUTO: 0.41 10*3/MM3 (ref 0–0.9)
MONOCYTES NFR BLD AUTO: 5 % (ref 0–12)
NEUTROPHILS # BLD AUTO: 5.34 10*3/MM3 (ref 2–8.6)
NEUTROPHILS NFR BLD AUTO: 65.7 % (ref 37–80)
NITRITE UR-MCNC: NEGATIVE MG/ML
PH UR: 5 [PH] (ref 5–8)
PLATELET # BLD AUTO: 361 10*3/MM3 (ref 150–450)
PMV BLD AUTO: 10 FL (ref 8–12)
POTASSIUM BLD-SCNC: 4.1 MMOL/L (ref 3.5–5.1)
PROT SERPL-MCNC: 7.4 G/DL (ref 6.3–8.6)
PROT UR STRIP-MCNC: NEGATIVE MG/DL
RBC # BLD AUTO: 4.46 10*6/MM3 (ref 3.77–5.16)
RBC # UR STRIP: NEGATIVE /UL
SODIUM BLD-SCNC: 140 MMOL/L (ref 137–145)
SP GR UR: 1.01 (ref 1–1.03)
TSH SERPL DL<=0.05 MIU/L-ACNC: 1.88 MIU/ML (ref 0.46–4.68)
UROBILINOGEN UR QL: NORMAL
VIT B12 BLD-MCNC: 359 PG/ML (ref 239–931)
WBC NRBC COR # BLD: 8.13 10*3/MM3 (ref 3.2–9.8)

## 2017-08-25 PROCEDURE — 99214 OFFICE O/P EST MOD 30 MIN: CPT | Performed by: NURSE PRACTITIONER

## 2017-08-25 PROCEDURE — 82607 VITAMIN B-12: CPT | Performed by: NURSE PRACTITIONER

## 2017-08-25 PROCEDURE — 83540 ASSAY OF IRON: CPT | Performed by: NURSE PRACTITIONER

## 2017-08-25 PROCEDURE — 80053 COMPREHEN METABOLIC PANEL: CPT | Performed by: NURSE PRACTITIONER

## 2017-08-25 PROCEDURE — 83735 ASSAY OF MAGNESIUM: CPT | Performed by: NURSE PRACTITIONER

## 2017-08-25 PROCEDURE — 36415 COLL VENOUS BLD VENIPUNCTURE: CPT | Performed by: NURSE PRACTITIONER

## 2017-08-25 PROCEDURE — 84443 ASSAY THYROID STIM HORMONE: CPT | Performed by: NURSE PRACTITIONER

## 2017-08-25 PROCEDURE — 83036 HEMOGLOBIN GLYCOSYLATED A1C: CPT | Performed by: NURSE PRACTITIONER

## 2017-08-25 PROCEDURE — 85025 COMPLETE CBC W/AUTO DIFF WBC: CPT | Performed by: NURSE PRACTITIONER

## 2017-08-25 PROCEDURE — 82306 VITAMIN D 25 HYDROXY: CPT | Performed by: NURSE PRACTITIONER

## 2017-08-25 RX ORDER — FLUOXETINE 10 MG/1
10 CAPSULE ORAL DAILY
Qty: 30 CAPSULE | Refills: 11 | Status: SHIPPED | OUTPATIENT
Start: 2017-08-25 | End: 2018-07-11

## 2017-08-25 NOTE — PROGRESS NOTES
Chief Complaint   Patient presents with   • Follow-up     having problems urinating since starting the trokendi   • Headache     stopped the trokendi 3 days ago     Subjective   Ellie Saini is a 31 y.o. female.     Headache    This is a chronic problem. The current episode started more than 1 month ago. The problem occurs constantly. The problem has been gradually worsening. The pain is located in the bilateral region. The pain radiates to the upper back. The pain quality is similar to prior headaches. The quality of the pain is described as aching and band-like. The pain is at a severity of 5/10. The pain is severe. Associated symptoms include back pain, neck pain and numbness. Pertinent negatives include no abdominal pain, abnormal behavior, coughing, dizziness, drainage, ear pain, eye pain, eye redness, eye watering, facial sweating, fever, hearing loss, insomnia, loss of balance, muscle aches, nausea, phonophobia, photophobia, rhinorrhea, scalp tenderness, seizures, sinus pressure, sore throat, swollen glands, tingling, tinnitus, visual change, vomiting or weakness. Nothing aggravates the symptoms. Treatments tried: trokendi  The treatment provided mild relief.   Cough   This is a new problem. The current episode started in the past 7 days. The problem has been gradually worsening. The problem occurs constantly. The cough is productive of sputum. Associated symptoms include headaches, myalgias, nasal congestion and postnasal drip. Pertinent negatives include no chest pain, chills, ear congestion, ear pain, eye redness, fever, heartburn, hemoptysis, rash, rhinorrhea, sore throat, shortness of breath, sweats or wheezing. Nothing aggravates the symptoms. She has tried OTC cough suppressant for the symptoms. The treatment provided mild relief. There is no history of asthma, bronchiectasis, bronchitis, COPD, environmental allergies or pneumonia.   Migraine    This is a recurrent problem. The current  episode started more than 1 year ago. The problem occurs daily. The problem has been rapidly worsening. The pain is located in the bilateral region. The pain does not radiate. The pain quality is not similar to prior headaches. The quality of the pain is described as boring, dull and throbbing. The pain is at a severity of 10/10. The pain is moderate. Associated symptoms include back pain, neck pain and numbness. Pertinent negatives include no abdominal pain, abnormal behavior, coughing, dizziness, drainage, ear pain, eye pain, eye redness, eye watering, facial sweating, fever, hearing loss, insomnia, loss of balance, muscle aches, nausea, phonophobia, photophobia, rhinorrhea, scalp tenderness, seizures, sinus pressure, sore throat, swollen glands, tingling, tinnitus, visual change, vomiting or weakness. Nothing aggravates the symptoms. She has tried acetaminophen, antidepressants, beta blockers, cold packs, ergotamines, injectable narcotics, NSAIDs, oral narcotics and triptans for the symptoms. The treatment provided moderate relief.   Back Pain   This is a recurrent problem. The current episode started 1 to 4 weeks ago. The problem occurs constantly. The problem has been gradually worsening since onset. The pain is present in the lumbar spine. The pain is at a severity of 4/10. The pain is moderate. The pain is the same all the time. The symptoms are aggravated by bending. Stiffness is present all day. Associated symptoms include dysuria, headaches, numbness, paresis and paresthesias. Pertinent negatives include no abdominal pain, bladder incontinence, bowel incontinence, chest pain, fever, leg pain, pelvic pain, perianal numbness, tingling or weakness. She has tried heat, home exercises, ice and muscle relaxant for the symptoms. The treatment provided mild relief.   Difficulty Urinating   This is a new problem. The current episode started in the past 7 days. The problem occurs constantly. The problem has been  gradually worsening. Associated symptoms include arthralgias, headaches, joint swelling, myalgias, neck pain, numbness and urinary symptoms. Pertinent negatives include no abdominal pain, chest pain, chills, coughing, fever, nausea, rash, sore throat, swollen glands, visual change, vomiting or weakness. Nothing aggravates the symptoms. She has tried nothing for the symptoms.   Anxiety   Presents for follow-up visit. Symptoms include decreased concentration, depressed mood, excessive worry, irritability, malaise, muscle tension, nervous/anxious behavior and panic. Patient reports no chest pain, compulsions, confusion, dizziness, feeling of choking, hyperventilation, impotence, insomnia, nausea, obsessions, palpitations, restlessness, shortness of breath or suicidal ideas. Symptoms occur constantly. The quality of sleep is good. Nighttime awakenings: none.     There is no history of asthma.        The following portions of the patient's history were reviewed and updated as appropriate: allergies, current medications, past social history and problem list.    Review of Systems   Constitutional: Positive for irritability. Negative for chills and fever.   HENT: Positive for postnasal drip. Negative for ear pain, hearing loss, rhinorrhea, sinus pressure, sore throat and tinnitus.    Eyes: Negative.  Negative for photophobia, pain and redness.   Respiratory: Negative.  Negative for cough, hemoptysis, shortness of breath and wheezing.    Cardiovascular: Negative.  Negative for chest pain, palpitations and leg swelling.   Gastrointestinal: Negative.  Negative for abdominal distention, abdominal pain, bowel incontinence, heartburn, nausea and vomiting.   Endocrine: Negative.  Negative for cold intolerance, heat intolerance, polydipsia, polyphagia and polyuria.   Genitourinary: Positive for difficulty urinating, dysuria, frequency and urgency. Negative for bladder incontinence, decreased urine volume, enuresis, flank pain,  "impotence and pelvic pain.   Musculoskeletal: Positive for arthralgias, back pain, gait problem, joint swelling, myalgias, neck pain and neck stiffness.   Skin: Negative.  Negative for rash.   Allergic/Immunologic: Negative.  Negative for environmental allergies.   Neurological: Positive for light-headedness, numbness, headaches and paresthesias. Negative for dizziness, tingling, tremors, seizures, syncope, facial asymmetry, speech difficulty, weakness and loss of balance.   Hematological: Negative.  Negative for adenopathy. Does not bruise/bleed easily.   Psychiatric/Behavioral: Positive for agitation, decreased concentration and sleep disturbance. Negative for behavioral problems, confusion, dysphoric mood, hallucinations, self-injury and suicidal ideas. The patient is nervous/anxious. The patient does not have insomnia and is not hyperactive.    All other systems reviewed and are negative.      Objective   /70  Ht 57\" (144.8 cm)  Wt 129 lb (58.5 kg)  BMI 27.92 kg/m2  Physical Exam   Constitutional: She is oriented to person, place, and time. She appears well-developed and well-nourished.   Tearful during exam   HENT:   Head: Normocephalic and atraumatic.   Thick pnd present -mid facial pain and pressure    Eyes: Conjunctivae and EOM are normal. Pupils are equal, round, and reactive to light. Right eye exhibits no discharge. Left eye exhibits no discharge. No scleral icterus.   Neck: Normal range of motion. Neck supple. No JVD present. No tracheal deviation present. No thyromegaly present.   Cardiovascular: Normal rate, regular rhythm, normal heart sounds, intact distal pulses and normal pulses.  Exam reveals no gallop and no friction rub.    No murmur heard.  Pulmonary/Chest: Effort normal and breath sounds normal. No stridor. No respiratory distress. She has no wheezes. She has no rales. She exhibits no tenderness.   Abdominal: Soft. Bowel sounds are normal. She exhibits no distension and no mass. There " is no tenderness. There is no guarding. No hernia.   Genitourinary: Rectum normal. Pelvic exam was performed with patient supine. Uterus is not deviated, not enlarged, not fixed and not tender. Cervix exhibits no motion tenderness, no discharge and no friability. Right adnexum displays no mass. Left adnexum displays no mass.   Musculoskeletal: She exhibits tenderness. She exhibits no edema or deformity.        Cervical back: She exhibits decreased range of motion, tenderness, bony tenderness, pain and spasm. She exhibits no swelling, no edema, no deformity, no laceration and normal pulse.        Back:    Lymphadenopathy:     She has no cervical adenopathy.   Neurological: She is alert and oriented to person, place, and time. She has normal reflexes. She displays normal reflexes. No cranial nerve deficit. She exhibits normal muscle tone. Coordination normal.   Skin: Skin is warm and dry. No rash noted. No erythema. No pallor.   Psychiatric: She has a normal mood and affect.   Nursing note and vitals reviewed.      Assessment/Plan   Problem List Items Addressed This Visit        Genitourinary    Urinary tract infection - Primary    Relevant Orders    POCT urinalysis dipstick, manual (Completed)    CBC & Differential (Completed)    Comprehensive Metabolic Panel (Completed)    Hemoglobin A1c (Completed)    Iron (Completed)    TSH (Completed)    Vitamin B12 (Completed)    Vitamin D 25 Hydroxy (Completed)    Magnesium (Completed)    CBC Auto Differential (Completed)       Other    Malaise    Relevant Orders    CBC & Differential (Completed)    Comprehensive Metabolic Panel (Completed)    Hemoglobin A1c (Completed)    Iron (Completed)    TSH (Completed)    Vitamin B12 (Completed)    Vitamin D 25 Hydroxy (Completed)    Magnesium (Completed)    CBC Auto Differential (Completed)    General medical exam    Relevant Orders    CBC & Differential (Completed)    Comprehensive Metabolic Panel (Completed)    Hemoglobin A1c  (Completed)    Iron (Completed)    TSH (Completed)    Vitamin B12 (Completed)    Vitamin D 25 Hydroxy (Completed)    Magnesium (Completed)    CBC Auto Differential (Completed)    Anxiety         No orders of the defined types were placed in this encounter.      It's not just what you eat, but when you eat  Eat breakfast, and eat smaller meals throughout the day. A healthy breakfast can jumpstart your metabolism, while eating small, healthy meals (rather than the standard three large meals) keeps your energy up.   Avoid eating at night. Try to eat dinner earlier and fast for 14-16 hours until breakfast the next morning. Studies suggest that eating only when you’re most active and giving your digestive system a long break each day may help to regulate weight.     Back on trokendi, meds as directed, increase water, will notify of labs when available.

## 2017-08-29 ENCOUNTER — HOSPITAL ENCOUNTER (OUTPATIENT)
Dept: PHYSICAL THERAPY | Facility: HOSPITAL | Age: 32
Setting detail: THERAPIES SERIES
Discharge: HOME | End: 2017-08-29

## 2017-08-29 DIAGNOSIS — M54.2 NECK PAIN: Primary | ICD-10-CM

## 2017-08-29 PROCEDURE — G0283 ELEC STIM OTHER THAN WOUND: HCPCS

## 2017-08-29 PROCEDURE — 97110 THERAPEUTIC EXERCISES: CPT

## 2017-08-29 PROCEDURE — 97012 MECHANICAL TRACTION THERAPY: CPT

## 2017-09-07 ENCOUNTER — HOSPITAL ENCOUNTER (OUTPATIENT)
Dept: PHYSICAL THERAPY | Facility: HOSPITAL | Age: 32
Setting detail: THERAPIES SERIES
Discharge: HOME | End: 2017-09-07

## 2017-09-07 DIAGNOSIS — M54.2 NECK PAIN: Primary | ICD-10-CM

## 2017-09-07 PROCEDURE — 97140 MANUAL THERAPY 1/> REGIONS: CPT

## 2017-09-07 PROCEDURE — 97110 THERAPEUTIC EXERCISES: CPT

## 2017-09-07 PROCEDURE — G0283 ELEC STIM OTHER THAN WOUND: HCPCS

## 2017-09-07 NOTE — PROGRESS NOTES
Outpatient Physical Therapy Ortho Treatment Note   Jessy Soham     Patient Name: Ellie Saini  : 1985  MRN: [id number]  Today's Date: 2017      Visit Date: 2017  Subjective Improvement:     5%  Attendance:   Approved:   8 Visits to  9-15-17        MD follow up:   Prn          date:     17    Visit Dx:    ICD-10-CM ICD-9-CM   1. Neck pain M54.2 723.1       Patient Active Problem List   Diagnosis   • Bilateral low back pain with right-sided sciatica   • Intractable migraine without aura and without status migrainosus   • Upper respiratory infection, acute   • Neck pain   • Malaise   • Urinary tract infection   • General medical exam   • Anxiety        Past Medical History:   Diagnosis Date   • Acute bronchitis    • Acute maxillary sinusitis    • Acute otitis media    • Acute pharyngitis    • Allergic rhinitis    • Breast lump    • Cramp in limb     Cramp in lower limb - bilateral      • Hormone replacement therapy (HRT)     H/O: hormone replacement (HRT)   • Insomnia    • Lymphadenopathy    • Malaise     Other malaise   • Migraine    • Postoperative visit    • Upper respiratory infection         Past Surgical History:   Procedure Laterality Date   •  SECTION      x2   • COLONOSCOPY  2008   • DIAGNOSTIC LAPAROSCOPY  2014   • LAPAROSCOPIC CHOLECYSTECTOMY  2007   • PAP SMEAR  2009   • PROCEDURE GENERIC CONVERTED  2013    REMOVE INTRAUTERINE DEVICE    • TOTAL ABDOMINAL HYSTERECTOMY WITH SALPINGO OOPHORECTOMY  2014                             PT Assessment/Plan       17 1300       PT Assessment    Functional Limitations Limitation in home management;Performance in self-care ADL;Limitations in functional capacity and performance  -TM     Impairments Impaired muscle endurance;Muscle strength;Pain;Peripheral nerve integrity;Range of motion;Posture;Sensation  -TM     Assessment Comments Pt very reactive to light touch in  "cervical/thoracic region. Has increased tone in musculature and doesn't tolerate MFR or STM very well.   -TM     Rehab Potential Good  -TM     Patient/caregiver participated in establishment of treatment plan and goals Yes  -TM     Patient would benefit from skilled therapy intervention Yes  -TM     PT Plan    PT Frequency 2x/week  -TM     Predicted Duration of Therapy Intervention (days/wks) 3 weeks  -TM     PT Plan Comments D/C traction due to lack of tolerance.  Posture cindy at wall.  T Band for HEP.  -TM       User Key  (r) = Recorded By, (t) = Taken By, (c) = Cosigned By    Initials Name Provider Type    TM Mariana Tejada PTA Physical Therapy Assistant                Modalities       09/07/17 1300          Moist Heat    MH Applied Yes  -TM      Location cervical  -TM      Rx Minutes --   20 min  -TM      MH S/P Rx Yes  -TM      ELECTRICAL STIMULATION    Attended/Unattended Unattended  -TM      Stimulation Type IFC  -TM      Max mAmp 17  -TM      Location/Electrode Placement/Other cervical w/MHP  -TM      Rx Minutes 20 mins  -TM        User Key  (r) = Recorded By, (t) = Taken By, (c) = Cosigned By    Initials Name Provider Type    TM Mariana Tejada PTA Physical Therapy Assistant                Exercises       09/07/17 1300          Subjective Comments    Subjective Comments Pt reports having bad muscle spasms following  mechanical traction.  Doesn't want to do that again. Reports numbness in B shoulder blades.  -TM      Subjective Pain    Able to rate subjective pain? yes  -TM      Pre-Treatment Pain Level 3  -TM      Exercise 1    Exercise Name 1 UT stretch  -TM      Reps 1 2  -TM      Time (Seconds) 1 30  -TM      Exercise 2    Exercise Name 2 swim glides  -TM      Sets 2 1  -TM      Reps 2 15  -TM      Exercise 3    Exercise Name 3 seated press downs  -TM      Sets 3 2  -TM      Reps 3 10  -TM      Time (Seconds) 3 5\"  -TM      Exercise 4    Exercise Name 4 CS isometrics  -TM      Sets 4 1  -TM      " Reps 4 10  -TM      Time (Seconds) 4 5  -TM      Exercise 5    Exercise Name 5 Red T Band rows  -TM      Sets 5 2  -TM      Reps 5 10  -TM      Exercise 6    Exercise Name 6 Red T Band ext  -TM      Sets 6 2  -TM      Reps 6 10  -TM        User Key  (r) = Recorded By, (t) = Taken By, (c) = Cosigned By    Initials Name Provider Type    TM Mariana Tejada PTA Physical Therapy Assistant                        Manual Rx (last 36 hours)      Manual Treatments       09/07/17 1300          Manual Rx 1    Manual Rx 1 Location Cervical/ Upper thoracic paraspinals  -TM      Manual Rx 1 Type STM  -TM        User Key  (r) = Recorded By, (t) = Taken By, (c) = Cosigned By    Initials Name Provider Type    TM Mariana Tejada PTA Physical Therapy Assistant                PT OP Goals       09/07/17 1300       PT Short Term Goals    STG Date to Achieve 08/21/17  -TM     STG 1 Independent in HEP   -TM     STG 1 Progress Ongoing  -TM     STG 2 No pain with AROM of c-spine   -TM     STG 2 Progress Ongoing  -TM     STG 3 Centralize radicular symptoms   -TM     STG 3 Progress Ongoing  -TM     STG 4 NDI 45% or better  -TM     STG 4 Progress Ongoing  -TM     STG 5 Patient to report decreased HA  -TM     STG 5 Progress Not Met  -TM       User Key  (r) = Recorded By, (t) = Taken By, (c) = Cosigned By    Initials Name Provider Type    TM Mariana Tejada PTA Physical Therapy Assistant                    Time Calculation:   Start Time: 1345  Stop Time: 1445  Time Calculation (min): 60 min  Total Timed Code Minutes- PT: 40 minute(s)    Therapy Charges for Today     Code Description Service Date Service Provider Modifiers Qty    44097246274 HC PT MANUAL THERAPY EA 15 MIN 9/7/2017 Marinaa Tejada PTA GP 1    01336485472 HC PT ELECTRICAL STIM UNATTENDED 9/7/2017 Mariana Tejada PTA  1    21804597660 HC PT THER PROC EA 15 MIN 9/7/2017 Mariana Tejada PTA GP 2                    Mariana Tejada PTA  9/7/2017

## 2017-09-11 RX ORDER — TRAZODONE HYDROCHLORIDE 50 MG/1
TABLET ORAL
Qty: 60 TABLET | Refills: 2 | Status: SHIPPED | OUTPATIENT
Start: 2017-09-11 | End: 2018-09-23 | Stop reason: SDUPTHER

## 2017-09-13 ENCOUNTER — HOSPITAL ENCOUNTER (OUTPATIENT)
Dept: PHYSICAL THERAPY | Facility: HOSPITAL | Age: 32
Setting detail: THERAPIES SERIES
Discharge: HOME | End: 2017-09-13

## 2017-09-13 DIAGNOSIS — M54.2 NECK PAIN: Primary | ICD-10-CM

## 2017-09-13 PROCEDURE — 97110 THERAPEUTIC EXERCISES: CPT | Performed by: PHYSICAL THERAPY ASSISTANT

## 2017-09-13 PROCEDURE — 97140 MANUAL THERAPY 1/> REGIONS: CPT | Performed by: PHYSICAL THERAPY ASSISTANT

## 2017-09-13 NOTE — THERAPY TREATMENT NOTE
Outpatient Physical Therapy Ortho Treatment Note   Soham Jiménez     Patient Name: Ellie Saini  : 1985  MRN: [id number]  Today's Date: 2017      Visit Date: 2017    Subjective Improvement:     50%  Attendance:   Approved:   8 Visits to  9-15-17        MD follow up:   Prn          date:     17  Visit Dx:    ICD-10-CM ICD-9-CM   1. Neck pain M54.2 723.1       Patient Active Problem List   Diagnosis   • Bilateral low back pain with right-sided sciatica   • Intractable migraine without aura and without status migrainosus   • Upper respiratory infection, acute   • Neck pain   • Malaise   • Urinary tract infection   • General medical exam   • Anxiety        Past Medical History:   Diagnosis Date   • Acute bronchitis    • Acute maxillary sinusitis    • Acute otitis media    • Acute pharyngitis    • Allergic rhinitis    • Breast lump    • Cramp in limb     Cramp in lower limb - bilateral      • Hormone replacement therapy (HRT)     H/O: hormone replacement (HRT)   • Insomnia    • Lymphadenopathy    • Malaise     Other malaise   • Migraine    • Postoperative visit    • Upper respiratory infection         Past Surgical History:   Procedure Laterality Date   •  SECTION      x2   • COLONOSCOPY  2008   • DIAGNOSTIC LAPAROSCOPY  2014   • LAPAROSCOPIC CHOLECYSTECTOMY  2007   • PAP SMEAR  2009   • PROCEDURE GENERIC CONVERTED  2013    REMOVE INTRAUTERINE DEVICE    • TOTAL ABDOMINAL HYSTERECTOMY WITH SALPINGO OOPHORECTOMY  2014             PT Ortho       17 1300    Subjective Pain    Post-Treatment Pain Level 3  -      User Key  (r) = Recorded By, (t) = Taken By, (c) = Cosigned By    Initials Name Provider Type    ANN Fong PTA Physical Therapy Assistant                            PT Assessment/Plan       17 1400       PT Assessment    Assessment Comments PT TTP @ C7, Pt had decreased sharp pain in L shoulder  with cervical distraction.  -     PT Plan    PT Frequency 2x/week  -     Predicted Duration of Therapy Intervention (days/wks) 3 weeks  -     PT Plan Comments Pt to talk with MD about MRI  -       User Key  (r) = Recorded By, (t) = Taken By, (c) = Cosigned By    Initials Name Provider Type    ANN Fong PTA Physical Therapy Assistant                Modalities       09/13/17 1300          Moist Heat    MH Applied Yes  -      Location cervical  -      Rx Minutes --   20 min  -[facility] S/P Rx Yes  -        User Key  (r) = Recorded By, (t) = Taken By, (c) = Cosigned By    Initials Name Provider Type     Ari Fong PTA Physical Therapy Assistant                Exercises       09/13/17 1300          Subjective Comments    Subjective Comments Pt reports she is complaint with HEP. Pt states she is not having constant numbness in her UE but having numbnesss when sleping.  -      Subjective Pain    Able to rate subjective pain? yes  -      Pre-Treatment Pain Level 3  -      Post-Treatment Pain Level 3  -      Exercise 1    Exercise Name 1 UT stretch  -      Reps 1 2  -      Time (Seconds) 1 30  -JH      Exercise 2    Exercise Name 2 swim glides  -      Sets 2 1  -JH      Reps 2 15  -JH      Exercise 3    Exercise Name 3 seated swims  -      Reps 3 20  -JH      Exercise 4    Exercise Name 4 scap squeeze  -      Reps 4 20  -JH      Time (Seconds) 4 5  -JH      Exercise 5    Exercise Name 5 no money  -      Sets 5 2  -JH      Reps 5 10  -JH      Exercise 6    Exercise Name 6 cervical isometric with ball @ wall  -      Sets 6 2  -JH      Reps 6 10  -JH      Time (Seconds) 6 5   -JH      Additional Comments flex,ext,L&R lateral flexion  -      Exercise 7    Exercise Name 7 mid, high rows  -      Sets 7 2  -JH      Reps 7 10  -JH      Additional Comments red tband  -      Exercise 8    Exercise Name 8 see manual  -        User Key  (r) = Recorded By, (t) = Taken By, (c) =  Cosigned By    Initials Name Provider Type     Ari Fong PTA Physical Therapy Assistant                        Manual Rx (last 36 hours)      Manual Treatments       09/13/17 1300          Manual Rx 1    Manual Rx 1 Location cervical distraction with [facility]  -      Manual Rx 1 Type [facility]  -      Manual Rx 1 Duration 8  -JH        User Key  (r) = Recorded By, (t) = Taken By, (c) = Cosigned By    Initials Name Provider Type    ANN Fong PTA Physical Therapy Assistant                PT OP Goals       09/13/17 1300       PT Short Term Goals    STG Date to Achieve 08/21/17  -     STG 1 Independent in HEP   -     STG 1 Progress Ongoing  -     STG 2 No pain with AROM of c-spine   -     STG 2 Progress Ongoing  -     STG 3 Centralize radicular symptoms   -     STG 3 Progress Ongoing;Progressing  -     STG 4 NDI 45% or better  -     STG 4 Progress Ongoing  -     STG 5 Patient to report decreased HA  -     STG 5 Progress Not Met  -       User Key  (r) = Recorded By, (t) = Taken By, (c) = Cosigned By    Initials Name Provider Type    ANN Fong PTA Physical Therapy Assistant                    Time Calculation:   Start Time: 1347  Stop Time: 1445  Time Calculation (min): 58 min    Therapy Charges for Today     Code Description Service Date Service Provider Modifiers Qty    81578186743 HC PT THER PROC EA 15 MIN 9/13/2017 Ari Fong PTA GP 2    46227342196 HC PT MANUAL THERAPY EA 15 MIN 9/13/2017 Ari Fong PTA GP 1    91998864492 HC PT THER SUPP EA 15 MIN 9/13/2017 Ari Fong PTA GP 1                    Ari Fong PTA  9/13/2017

## 2017-09-14 ENCOUNTER — APPOINTMENT (OUTPATIENT)
Dept: PHYSICAL THERAPY | Facility: HOSPITAL | Age: 32
End: 2017-09-14

## 2017-09-18 ENCOUNTER — DOCUMENTATION (OUTPATIENT)
Dept: PHYSICAL THERAPY | Facility: HOSPITAL | Age: 32
End: 2017-09-18

## 2017-12-21 RX ORDER — ESTRADIOL 1 MG/1
TABLET ORAL
Qty: 30 TABLET | Refills: 5 | Status: SHIPPED | OUTPATIENT
Start: 2017-12-21 | End: 2018-08-20 | Stop reason: SDUPTHER

## 2018-01-29 ENCOUNTER — TELEPHONE (OUTPATIENT)
Dept: FAMILY MEDICINE CLINIC | Facility: CLINIC | Age: 33
End: 2018-01-29

## 2018-01-31 ENCOUNTER — OFFICE VISIT (OUTPATIENT)
Dept: FAMILY MEDICINE CLINIC | Facility: CLINIC | Age: 33
End: 2018-01-31

## 2018-01-31 VITALS
SYSTOLIC BLOOD PRESSURE: 100 MMHG | HEIGHT: 57 IN | WEIGHT: 131 LBS | DIASTOLIC BLOOD PRESSURE: 70 MMHG | BODY MASS INDEX: 28.26 KG/M2

## 2018-01-31 DIAGNOSIS — Z86.69 HX OF MIGRAINES: ICD-10-CM

## 2018-01-31 DIAGNOSIS — R40.20 LOC (LOSS OF CONSCIOUSNESS) (HCC): ICD-10-CM

## 2018-01-31 DIAGNOSIS — R55 SYNCOPE, UNSPECIFIED SYNCOPE TYPE: Primary | ICD-10-CM

## 2018-01-31 DIAGNOSIS — H53.9 VISUAL CHANGES: ICD-10-CM

## 2018-01-31 PROCEDURE — 99213 OFFICE O/P EST LOW 20 MIN: CPT | Performed by: NURSE PRACTITIONER

## 2018-01-31 RX ORDER — TOPIRAMATE 50 MG/1
50 TABLET, FILM COATED ORAL DAILY
Qty: 30 TABLET | Refills: 11 | Status: SHIPPED | OUTPATIENT
Start: 2018-01-31 | End: 2018-10-22 | Stop reason: DRUGHIGH

## 2018-01-31 NOTE — PROGRESS NOTES
Chief Complaint   Patient presents with   • seizure     had an episode last week ?     Subjective   Ellie Saini is a 32 y.o. female.     HPI Comments: Patient reports episode of passing out -witnessed by her friend-the patient felt like she was dreaming but her eyes where open -lasted 20 seconds -similar symptoms age 19-no triggers     Seizures    This is a new problem. The current episode started more than 1 week ago. The problem has been resolved. The most recent episode lasted less than 30 seconds. Associated symptoms include sleepiness, headaches, speech difficulty, visual disturbances and muscle weakness. Pertinent negatives include no confusion, no neck stiffness, no sore throat, no chest pain, no cough, no nausea, no vomiting and no diarrhea. Characteristics include loss of consciousness. Characteristics do not include eye blinking, eye deviation, bowel incontinence, bladder incontinence, rhythmic jerking, bit tongue, apnea or cyanosis. The episode was witnessed. There was no sensation of an aura present. The seizures did not continue in the ED. The seizure(s) had no focality. Possible causes include medication or dosage change. Possible causes do not include sleep deprivation, missed seizure meds, recent illness or change in alcohol use. There has been no fever. There were no medications administered prior to arrival.   Headache    This is a chronic problem. The current episode started more than 1 year ago. The problem occurs intermittently. The problem has been rapidly worsening. The pain is located in the bilateral region. The pain radiates to the face. The pain quality is similar to prior headaches. The quality of the pain is described as band-like. The pain is at a severity of 10/10. The pain is severe. Associated symptoms include blurred vision, dizziness and seizures. Pertinent negatives include no abdominal pain, abnormal behavior, anorexia, back pain, coughing, drainage, ear pain, eye  pain, eye redness, eye watering, facial sweating, fever, hearing loss, insomnia, loss of balance, muscle aches, nausea, neck pain, numbness, phonophobia, photophobia, rhinorrhea, scalp tenderness, sinus pressure, sore throat, swollen glands, tingling, tinnitus, visual change, vomiting, weakness or weight loss. Nothing aggravates the symptoms. She has tried acetaminophen, Excedrin and NSAIDs for the symptoms. The treatment provided no relief. Her past medical history is significant for migraine headaches. There is no history of cancer, cluster headaches, hypertension, immunosuppression, migraines in the family, obesity, pseudotumor cerebri, recent head traumas, sinus disease or TMJ.        The following portions of the patient's history were reviewed and updated as appropriate: allergies, current medications, past social history and problem list.    Review of Systems   Constitutional: Negative.  Negative for fever and weight loss.   HENT: Negative for congestion, dental problem, drooling, ear discharge, ear pain, facial swelling, hearing loss, rhinorrhea, sinus pressure, sore throat and tinnitus.    Eyes: Positive for blurred vision and visual disturbance. Negative for photophobia, pain and redness.   Respiratory: Negative.  Negative for apnea, cough, choking, chest tightness and shortness of breath.    Cardiovascular: Negative.  Negative for chest pain, palpitations, leg swelling and cyanosis.   Gastrointestinal: Negative.  Negative for abdominal pain, anal bleeding, anorexia, blood in stool, bowel incontinence, constipation, diarrhea, nausea and vomiting.   Endocrine: Negative.  Negative for cold intolerance, heat intolerance, polydipsia, polyphagia and polyuria.   Genitourinary: Negative.  Negative for bladder incontinence.   Musculoskeletal: Negative.  Negative for arthralgias, back pain, gait problem, joint swelling, myalgias, neck pain and neck stiffness.   Skin: Negative.    Allergic/Immunologic: Negative.   "Negative for environmental allergies, food allergies and immunocompromised state.   Neurological: Positive for dizziness, seizures, loss of consciousness, syncope, speech difficulty and headaches. Negative for tingling, tremors, facial asymmetry, weakness, light-headedness, numbness and loss of balance.   Hematological: Negative.  Negative for adenopathy. Does not bruise/bleed easily.   Psychiatric/Behavioral: Negative.  Negative for agitation, behavioral problems, confusion, decreased concentration, dysphoric mood, hallucinations, self-injury, sleep disturbance and suicidal ideas. The patient is not nervous/anxious, does not have insomnia and is not hyperactive.    All other systems reviewed and are negative.      Objective   /70  Ht 144.8 cm (57\")  Wt 59.4 kg (131 lb)  BMI 28.35 kg/m2  Physical Exam   Constitutional: She is oriented to person, place, and time. She appears well-developed and well-nourished. No distress.   HENT:   Head: Normocephalic and atraumatic.   Mouth/Throat: No oropharyngeal exudate.   Thick pnd present -mid facial pain and pressure    Eyes: Conjunctivae and EOM are normal. Pupils are equal, round, and reactive to light. Right eye exhibits no discharge. Left eye exhibits no discharge. No scleral icterus.   Neck: Normal range of motion. Neck supple. No JVD present. No tracheal deviation present. No thyromegaly present.   Cardiovascular: Normal rate, regular rhythm, normal heart sounds, intact distal pulses and normal pulses.  Exam reveals no gallop and no friction rub.    No murmur heard.  Pulmonary/Chest: Effort normal and breath sounds normal. No stridor. No respiratory distress. She has no wheezes. She has no rales. She exhibits no tenderness.   Abdominal: Soft. Bowel sounds are normal. She exhibits no distension and no mass. There is no tenderness. There is no rebound and no guarding. No hernia.   Genitourinary: Rectum normal. Pelvic exam was performed with patient supine. Uterus " is not deviated, not enlarged, not fixed and not tender. Cervix exhibits no motion tenderness, no discharge and no friability. Right adnexum displays no mass. Left adnexum displays no mass.   Musculoskeletal: Normal range of motion. She exhibits no edema, tenderness or deformity.   Lymphadenopathy:     She has no cervical adenopathy.   Neurological: She is alert and oriented to person, place, and time. She has normal reflexes. She displays normal reflexes. No cranial nerve deficit. She exhibits normal muscle tone. Coordination normal.   Skin: Skin is warm and dry. No rash noted. She is not diaphoretic. No erythema. No pallor.   Psychiatric: She has a normal mood and affect.   Nursing note and vitals reviewed.      Assessment/Plan   Problem List Items Addressed This Visit        Cardiovascular and Mediastinum    Syncope - Primary    Relevant Orders    MRI Brain Without Contrast    Ambulatory Referral to Neurology       Other    Hx of migraines    Relevant Orders    MRI Brain Without Contrast    Ambulatory Referral to Neurology    Visual changes    LOC (loss of consciousness)           New Medications Ordered This Visit   Medications   • topiramate (TOPAMAX) 50 MG tablet     Sig: Take 1 tablet by mouth Daily.     Dispense:  30 tablet     Refill:  11       It's not just what you eat, but when you eat  Eat breakfast, and eat smaller meals throughout the day. A healthy breakfast can jumpstart your metabolism, while eating small, healthy meals (rather than the standard three large meals) keeps your energy up.   Avoid eating at night. Try to eat dinner earlier and fast for 14-16 hours until breakfast the next morning. Studies suggest that eating only when you’re most active and giving your digestive system a long break each day may help to regulate weight.     Back on topamax-stopped taking it about 3 weeks ago and symptoms occurred aftered-I do feel it is related-go ahead and refer her for follow up and further workup mri  to rule out other reasons

## 2018-02-05 ENCOUNTER — TELEPHONE (OUTPATIENT)
Dept: FAMILY MEDICINE CLINIC | Facility: CLINIC | Age: 33
End: 2018-02-05

## 2018-02-05 RX ORDER — PANTOPRAZOLE SODIUM 40 MG/1
40 TABLET, DELAYED RELEASE ORAL DAILY
Qty: 30 TABLET | Refills: 11 | Status: SHIPPED | OUTPATIENT
Start: 2018-02-05 | End: 2019-02-21 | Stop reason: SDUPTHER

## 2018-02-06 ENCOUNTER — HOSPITAL ENCOUNTER (OUTPATIENT)
Dept: MRI IMAGING | Facility: HOSPITAL | Age: 33
Discharge: HOME | End: 2018-02-06
Admitting: NURSE PRACTITIONER

## 2018-02-06 PROCEDURE — 70551 MRI BRAIN STEM W/O DYE: CPT

## 2018-03-02 RX ORDER — SUMATRIPTAN 50 MG/1
TABLET, FILM COATED ORAL
Qty: 9 TABLET | Refills: 0 | Status: SHIPPED | OUTPATIENT
Start: 2018-03-02 | End: 2018-07-11 | Stop reason: ALTCHOICE

## 2018-07-02 RX ORDER — BACLOFEN 10 MG/1
TABLET ORAL
Qty: 90 TABLET | Refills: 0 | Status: SHIPPED | OUTPATIENT
Start: 2018-07-02 | End: 2018-08-24 | Stop reason: SDUPTHER

## 2018-07-11 ENCOUNTER — TELEPHONE (OUTPATIENT)
Dept: FAMILY MEDICINE CLINIC | Facility: CLINIC | Age: 33
End: 2018-07-11

## 2018-07-11 ENCOUNTER — OFFICE VISIT (OUTPATIENT)
Dept: FAMILY MEDICINE CLINIC | Facility: CLINIC | Age: 33
End: 2018-07-11

## 2018-07-11 ENCOUNTER — LAB (OUTPATIENT)
Dept: LAB | Facility: HOSPITAL | Age: 33
End: 2018-07-11

## 2018-07-11 VITALS
TEMPERATURE: 97.6 F | DIASTOLIC BLOOD PRESSURE: 64 MMHG | HEIGHT: 57 IN | SYSTOLIC BLOOD PRESSURE: 110 MMHG | WEIGHT: 128 LBS | BODY MASS INDEX: 27.61 KG/M2

## 2018-07-11 DIAGNOSIS — K59.09 CHRONIC CONSTIPATION: Primary | ICD-10-CM

## 2018-07-11 DIAGNOSIS — H93.8X3 EAR FULLNESS, BILATERAL: ICD-10-CM

## 2018-07-11 DIAGNOSIS — K59.09 CHRONIC CONSTIPATION: ICD-10-CM

## 2018-07-11 LAB
25(OH)D3 SERPL-MCNC: 43.8 NG/ML (ref 30–100)
ALBUMIN SERPL-MCNC: 4.5 G/DL (ref 3.4–4.8)
ALBUMIN/GLOB SERPL: 1.5 G/DL (ref 1.1–1.8)
ALP SERPL-CCNC: 42 U/L (ref 38–126)
ALT SERPL W P-5'-P-CCNC: 28 U/L (ref 9–52)
ANION GAP SERPL CALCULATED.3IONS-SCNC: 11 MMOL/L (ref 5–15)
AST SERPL-CCNC: 29 U/L (ref 14–36)
BASOPHILS # BLD AUTO: 0.03 10*3/MM3 (ref 0–0.2)
BASOPHILS NFR BLD AUTO: 0.6 % (ref 0–2)
BILIRUB SERPL-MCNC: 0.4 MG/DL (ref 0.2–1.3)
BUN BLD-MCNC: 23 MG/DL (ref 7–21)
BUN/CREAT SERPL: 36.5 (ref 7–25)
CALCIUM SPEC-SCNC: 9.2 MG/DL (ref 8.4–10.2)
CHLORIDE SERPL-SCNC: 107 MMOL/L (ref 95–110)
CO2 SERPL-SCNC: 20 MMOL/L (ref 22–31)
CREAT BLD-MCNC: 0.63 MG/DL (ref 0.5–1)
DEPRECATED RDW RBC AUTO: 43 FL (ref 36.4–46.3)
EOSINOPHIL # BLD AUTO: 0.11 10*3/MM3 (ref 0–0.7)
EOSINOPHIL NFR BLD AUTO: 2.4 % (ref 0–7)
ERYTHROCYTE [DISTWIDTH] IN BLOOD BY AUTOMATED COUNT: 13.3 % (ref 11.5–14.5)
GFR SERPL CREATININE-BSD FRML MDRD: 110 ML/MIN/1.73 (ref 64–149)
GLOBULIN UR ELPH-MCNC: 3 GM/DL (ref 2.3–3.5)
GLUCOSE BLD-MCNC: 83 MG/DL (ref 60–100)
HCT VFR BLD AUTO: 37.2 % (ref 35–45)
HGB BLD-MCNC: 12.8 G/DL (ref 12–15.5)
IMM GRANULOCYTES # BLD: 0.01 10*3/MM3 (ref 0–0.02)
IMM GRANULOCYTES NFR BLD: 0.2 % (ref 0–0.5)
IRON 24H UR-MRATE: 75 MCG/DL (ref 37–170)
LYMPHOCYTES # BLD AUTO: 2.02 10*3/MM3 (ref 0.6–4.2)
LYMPHOCYTES NFR BLD AUTO: 43.4 % (ref 10–50)
MCH RBC QN AUTO: 30.3 PG (ref 26.5–34)
MCHC RBC AUTO-ENTMCNC: 34.4 G/DL (ref 31.4–36)
MCV RBC AUTO: 87.9 FL (ref 80–98)
MONOCYTES # BLD AUTO: 0.25 10*3/MM3 (ref 0–0.9)
MONOCYTES NFR BLD AUTO: 5.4 % (ref 0–12)
NEUTROPHILS # BLD AUTO: 2.23 10*3/MM3 (ref 2–8.6)
NEUTROPHILS NFR BLD AUTO: 48 % (ref 37–80)
PLATELET # BLD AUTO: 322 10*3/MM3 (ref 150–450)
PMV BLD AUTO: 10.4 FL (ref 8–12)
POTASSIUM BLD-SCNC: 3.7 MMOL/L (ref 3.5–5.1)
PROT SERPL-MCNC: 7.5 G/DL (ref 6.3–8.6)
RBC # BLD AUTO: 4.23 10*6/MM3 (ref 3.77–5.16)
SODIUM BLD-SCNC: 138 MMOL/L (ref 137–145)
TSH SERPL DL<=0.05 MIU/L-ACNC: 2.76 MIU/ML (ref 0.46–4.68)
VIT B12 BLD-MCNC: 329 PG/ML (ref 239–931)
WBC NRBC COR # BLD: 4.65 10*3/MM3 (ref 3.2–9.8)

## 2018-07-11 PROCEDURE — 36415 COLL VENOUS BLD VENIPUNCTURE: CPT | Performed by: NURSE PRACTITIONER

## 2018-07-11 PROCEDURE — 86003 ALLG SPEC IGE CRUDE XTRC EA: CPT | Performed by: NURSE PRACTITIONER

## 2018-07-11 PROCEDURE — 83516 IMMUNOASSAY NONANTIBODY: CPT

## 2018-07-11 PROCEDURE — 82306 VITAMIN D 25 HYDROXY: CPT | Performed by: NURSE PRACTITIONER

## 2018-07-11 PROCEDURE — 80053 COMPREHEN METABOLIC PANEL: CPT | Performed by: NURSE PRACTITIONER

## 2018-07-11 PROCEDURE — 83540 ASSAY OF IRON: CPT | Performed by: NURSE PRACTITIONER

## 2018-07-11 PROCEDURE — 82784 ASSAY IGA/IGD/IGG/IGM EACH: CPT

## 2018-07-11 PROCEDURE — 82607 VITAMIN B-12: CPT | Performed by: NURSE PRACTITIONER

## 2018-07-11 PROCEDURE — 99213 OFFICE O/P EST LOW 20 MIN: CPT | Performed by: NURSE PRACTITIONER

## 2018-07-11 PROCEDURE — 84443 ASSAY THYROID STIM HORMONE: CPT | Performed by: NURSE PRACTITIONER

## 2018-07-11 PROCEDURE — 85025 COMPLETE CBC W/AUTO DIFF WBC: CPT | Performed by: NURSE PRACTITIONER

## 2018-07-11 PROCEDURE — 86255 FLUORESCENT ANTIBODY SCREEN: CPT

## 2018-07-11 RX ORDER — GUAIFENESIN 600 MG/1
1200 TABLET, EXTENDED RELEASE ORAL DAILY
Qty: 14 TABLET | Refills: 11 | Status: SHIPPED | OUTPATIENT
Start: 2018-07-11 | End: 2022-06-10

## 2018-07-11 RX ORDER — FLUTICASONE PROPIONATE 50 MCG
2 SPRAY, SUSPENSION (ML) NASAL DAILY
Qty: 16 G | Refills: 11 | Status: SHIPPED | OUTPATIENT
Start: 2018-07-11 | End: 2021-02-04 | Stop reason: SDUPTHER

## 2018-07-11 RX ORDER — DOCUSATE SODIUM 100 MG/1
100 CAPSULE, LIQUID FILLED ORAL 2 TIMES DAILY PRN
Qty: 60 CAPSULE | Refills: 5 | Status: SHIPPED | OUTPATIENT
Start: 2018-07-11 | End: 2021-02-08 | Stop reason: SDUPTHER

## 2018-07-11 RX ORDER — CETIRIZINE HYDROCHLORIDE 10 MG/1
10 TABLET ORAL DAILY
Qty: 30 TABLET | Refills: 11 | Status: SHIPPED | OUTPATIENT
Start: 2018-07-11 | End: 2019-08-11 | Stop reason: SDUPTHER

## 2018-07-11 NOTE — TELEPHONE ENCOUNTER
-Patient notified with results and recommendations.    ---- Message from EDUARDO Ma sent at 7/11/2018  4:02 PM CDT -----  Can you let her know her labs are normal -no problems noted with lab results

## 2018-07-11 NOTE — PROGRESS NOTES
Chief Complaint   Patient presents with   • Earache     right greater then left , on again off again pain     Subjective   Ellie Saini is a 32 y.o. female.     Earache    There is pain in both ears. This is a recurrent problem. The current episode started more than 1 month ago. The problem occurs constantly. The problem has been rapidly worsening. There has been no fever. The pain is at a severity of 5/10. The pain is moderate. Associated symptoms include hearing loss. Pertinent negatives include no abdominal pain, coughing, diarrhea, ear discharge, headaches, neck pain, rash, rhinorrhea, sore throat or vomiting. The treatment provided mild relief. There is no history of a chronic ear infection, hearing loss or a tympanostomy tube.   Constipation   This is a recurrent problem. The current episode started more than 1 year ago. The problem has been gradually worsening since onset. Her stool frequency is 1 time per week or less. The patient is on a high fiber diet. She does not exercise regularly. There has been adequate water intake. Associated symptoms include bloating. Pertinent negatives include no abdominal pain, anorexia, back pain, diarrhea, fecal incontinence, fever, flatus, hematochezia, hemorrhoids, melena, nausea, rectal pain, vomiting or weight loss. Risk factors include dietary change. She has tried diet changes and fiber for the symptoms. The treatment provided no relief. There is no history of abdominal surgery, endocrine disease, inflammatory bowel disease, irritable bowel syndrome, neurologic disease, neuromuscular disease, psychiatric history or radiation treatment.        The following portions of the patient's history were reviewed and updated as appropriate: allergies, current medications, past social history and problem list.    Review of Systems   Constitutional: Negative.  Negative for activity change, appetite change, chills, diaphoresis, fever and weight loss.   HENT: Positive for  "congestion, ear pain, hearing loss, postnasal drip and sinus pressure. Negative for dental problem, ear discharge, mouth sores, rhinorrhea, sinus pain, sneezing, sore throat, tinnitus, trouble swallowing and voice change.    Eyes: Negative.  Negative for redness and visual disturbance.   Respiratory: Negative.  Negative for apnea, cough, choking, chest tightness, shortness of breath, wheezing and stridor.    Cardiovascular: Negative.  Negative for chest pain, palpitations and leg swelling.   Gastrointestinal: Positive for bloating and constipation. Negative for abdominal distention, abdominal pain, anal bleeding, anorexia, diarrhea, flatus, hematochezia, hemorrhoids, melena, nausea, rectal pain and vomiting.   Endocrine: Negative.    Genitourinary: Negative.    Musculoskeletal: Negative.  Negative for arthralgias, back pain, gait problem and neck pain.   Skin: Negative.  Negative for rash.   Allergic/Immunologic: Negative.    Neurological: Negative.  Negative for light-headedness, numbness and headaches.   Hematological: Negative.    Psychiatric/Behavioral: Negative.    All other systems reviewed and are negative.      Objective   /64   Temp 97.6 °F (36.4 °C) (Tympanic)   Ht 144.8 cm (57\")   Wt 58.1 kg (128 lb)   BMI 27.70 kg/m²   Physical Exam   Constitutional: She is oriented to person, place, and time. She appears well-developed and well-nourished. No distress.   HENT:   Head: Normocephalic and atraumatic.   Mouth/Throat: No oropharyngeal exudate.   Thick pnd present -mid facial pain and pressure-fluid both ears    Eyes: Conjunctivae and EOM are normal. Pupils are equal, round, and reactive to light. Right eye exhibits no discharge. Left eye exhibits no discharge. No scleral icterus.   Neck: Normal range of motion. Neck supple. No JVD present. No tracheal deviation present. No thyromegaly present.   Cardiovascular: Normal rate, regular rhythm, normal heart sounds, intact distal pulses and normal pulses. "  Exam reveals no gallop and no friction rub.    No murmur heard.  Pulmonary/Chest: Effort normal and breath sounds normal. No stridor. No respiratory distress. She has no wheezes. She has no rales. She exhibits no tenderness.   Abdominal: Soft. Bowel sounds are normal. She exhibits no distension and no mass. There is no tenderness. There is no rebound and no guarding. No hernia.   Genitourinary: Rectum normal. Pelvic exam was performed with patient supine. Uterus is not deviated, not enlarged, not fixed and not tender. Cervix exhibits no motion tenderness, no discharge and no friability. Right adnexum displays no mass. Left adnexum displays no mass.   Musculoskeletal: Normal range of motion. She exhibits no edema, tenderness or deformity.   Lymphadenopathy:     She has no cervical adenopathy.   Neurological: She is alert and oriented to person, place, and time. She has normal reflexes. She displays normal reflexes. No cranial nerve deficit. She exhibits normal muscle tone. Coordination normal.   Skin: Skin is warm and dry. No rash noted. She is not diaphoretic. No erythema. No pallor.   Psychiatric: She has a normal mood and affect.   Nursing note and vitals reviewed.      Assessment/Plan   Problem List Items Addressed This Visit        Digestive    Chronic constipation - Primary    Relevant Orders    Ambulatory Referral to Gastroenterology    Allergen Food Panel    Celiac Disease Panel    CBC & Differential    Comprehensive Metabolic Panel    TSH    Iron    Vitamin B12    Vitamin D 25 Hydroxy    CBC Auto Differential       Nervous and Auditory    Ear fullness, bilateral    Relevant Orders    Allergen Food Panel    Celiac Disease Panel    CBC & Differential    Comprehensive Metabolic Panel    TSH    Iron    Vitamin B12    Vitamin D 25 Hydroxy    CBC Auto Differential           New Medications Ordered This Visit   Medications   • cetirizine (zyrTEC) 10 MG tablet     Sig: Take 1 tablet by mouth Daily.     Dispense:   30 tablet     Refill:  11   • fluticasone (FLONASE) 50 MCG/ACT nasal spray     Si sprays into each nostril Daily.     Dispense:  16 g     Refill:  11   • guaiFENesin (MUCINEX) 600 MG 12 hr tablet     Sig: Take 2 tablets by mouth Daily.     Dispense:  14 tablet     Refill:  11   • docusate sodium (COLACE) 100 MG capsule     Sig: Take 1 capsule by mouth 2 (Two) Times a Day As Needed for Constipation.     Dispense:  60 capsule     Refill:  5       It's not just what you eat, but when you eat  Eat breakfast, and eat smaller meals throughout the day. A healthy breakfast can jumpstart your metabolism, while eating small, healthy meals (rather than the standard three large meals) keeps your energy up.   Avoid eating at night. Try to eat dinner earlier and fast for 14-16 hours until breakfast the next morning. Studies suggest that eating only when you’re most active and giving your digestive system a long break each day may help to regulate weight.

## 2018-07-13 ENCOUNTER — TELEPHONE (OUTPATIENT)
Dept: FAMILY MEDICINE CLINIC | Facility: CLINIC | Age: 33
End: 2018-07-13

## 2018-07-13 LAB
ENDOMYSIUM IGA SER QL: NEGATIVE
IGA SERPL-MCNC: 132 MG/DL (ref 87–352)
TTG IGA SER-ACNC: <2 U/ML (ref 0–3)

## 2018-07-13 NOTE — PROGRESS NOTES
EDUARDO Martell, Ms. Saini has been called with her recent lab results & recommendations.  Continue her current medications and follow-up as planned or sooner if any problems.

## 2018-07-13 NOTE — TELEPHONE ENCOUNTER
EDUARDO Martell, Ms. Saini has been called with her recent lab results & recommendations.  Continue her current medications and follow-up as planned or sooner if any problems.      ----- Message from EDUARDO Ma sent at 7/13/2018 12:21 PM CDT -----  Can you let know the celiac is normal

## 2018-07-14 LAB
BARLEY IGE QN: 0.24 KU/L
BEEF IGE QN: <0.1 KU/L
CABBAGE IGE QN: <0.1 KU/L
CARROT IGE QN: <0.1 KU/L
CHICKEN MEAT IGE QN: <0.1 KU/L
CODFISH IGE QN: <0.1 KU/L
CONV CLASS DESCRIPTION: ABNORMAL
CORN IGE QN: <0.1 KU/L
COW MILK IGE QN: <0.1 KU/L
CRAB IGE QN: <0.1 KU/L
EGG WHITE IGE QN: 0.28 KU/L
GRAPE IGE QN: <0.1 KU/L
GREEN PEPPER IGE QN: <0.1 KU/L
LETTUCE IGE QN: <0.1 KU/L
OAT IGE QN: <0.1 KU/L
ORANGE IGE QN: <0.1 KU/L
PEANUT IGE QN: <0.1 KU/L
PORK IGE QN: <0.1 KU/L
POTATO IGE QN: <0.1 KU/L
RICE IGE QN: <0.1 KU/L
RYE IGE: 0.17 KU/L
SHRIMP IGE: 1.97 KU/L
SOYBEAN IGE QN: <0.1 KU/L
TOMATO IGE QN: <0.1 KU/L
TUNA IGE QN: <0.1 KU/L
WHEAT IGE QN: 0.16 KU/L
WHITE BEAN IGE QN: <0.1 KU/L

## 2018-07-19 ENCOUNTER — TELEPHONE (OUTPATIENT)
Dept: FAMILY MEDICINE CLINIC | Facility: CLINIC | Age: 33
End: 2018-07-19

## 2018-07-19 NOTE — TELEPHONE ENCOUNTER
Patient notified with results and recommendations.    ----- Message from EDUARDO Ma sent at 7/16/2018  8:04 AM CDT -----  Can you let her know it did show some food sensitivities -shrimp was the most-needs to avoid shrimp

## 2018-07-31 NOTE — PROGRESS NOTES
Outpatient Physical Therapy Ortho Treatment Note   Soham Jiménez     Patient Name: Ellie Saini  : 1985  MRN: [id number]  Today's Date: 2017      Visit Date: 2017    Subjective Improvement:     5%  Attendance:   Approved:     8 Visits to  17      MD follow up:  prn          date:   17      Visit Dx:    ICD-10-CM ICD-9-CM   1. Neck pain M54.2 723.1       Patient Active Problem List   Diagnosis   • Bilateral low back pain with right-sided sciatica   • Intractable migraine without aura and without status migrainosus   • Upper respiratory infection, acute   • Neck pain   • Malaise   • Urinary tract infection   • General medical exam   • Anxiety        Past Medical History:   Diagnosis Date   • Acute bronchitis    • Acute maxillary sinusitis    • Acute otitis media    • Acute pharyngitis    • Allergic rhinitis    • Breast lump    • Cramp in limb     Cramp in lower limb - bilateral      • Hormone replacement therapy (HRT)     H/O: hormone replacement (HRT)   • Insomnia    • Lymphadenopathy    • Malaise     Other malaise   • Migraine    • Postoperative visit    • Upper respiratory infection         Past Surgical History:   Procedure Laterality Date   •  SECTION      x2   • COLONOSCOPY  2008   • DIAGNOSTIC LAPAROSCOPY  2014   • LAPAROSCOPIC CHOLECYSTECTOMY  2007   • PAP SMEAR  2009   • PROCEDURE GENERIC CONVERTED  2013    REMOVE INTRAUTERINE DEVICE    • TOTAL ABDOMINAL HYSTERECTOMY WITH SALPINGO OOPHORECTOMY  2014                             PT Assessment/Plan       17 1300       PT Assessment    Functional Limitations Limitation in home management;Performance in self-care ADL;Limitations in functional capacity and performance  -TM     Impairments Impaired muscle endurance;Muscle strength;Pain;Peripheral nerve integrity;Range of motion;Posture;Sensation  -TM     Assessment Comments Pt tolerated therex well.   Initiated mechanical traction & pt c/o muscles spasms kiel mid back during TX. Did better without stool under LEs.  -TM     Rehab Potential Good  -TM     Patient/caregiver participated in establishment of treatment plan and goals Yes  -TM     Patient would benefit from skilled therapy intervention Yes  -TM     PT Plan    PT Frequency 2x/week  -TM     Predicted Duration of Therapy Intervention (days/wks) 3 weeks  -TM     PT Plan Comments Try MHP to mid back during traction to improve comfort.  -TM       User Key  (r) = Recorded By, (t) = Taken By, (c) = Cosigned By    Initials Name Provider Type     Mariana Tejada PTA Physical Therapy Assistant                Modalities       08/29/17 1300          Moist Heat    MH Applied Yes  -TM      Location cervical  -TM      Rx Minutes Other:   20 min  -TM      MH S/P Rx Yes  -TM      ELECTRICAL STIMULATION    Attended/Unattended Unattended  -TM      Stimulation Type IFC  -TM      Max mAmp 17  -TM      Location/Electrode Placement/Other cervical w/MHP  -TM      Rx Minutes 20 mins  -TM      Traction 66289    Traction Type Cervical  -TM      Rx Minutes 10  -TM      Weight --   12#  -TM      Hold 60  -TM      Relax 10  -TM        User Key  (r) = Recorded By, (t) = Taken By, (c) = Cosigned By    Initials Name Provider Type     Mariana Tejada PTA Physical Therapy Assistant                Exercises       08/29/17 1300          Subjective Comments    Subjective Comments Pt feels like TPDN helped .  Pain not too bad today. N/T tends to get worse toward end of day.  -TM      Subjective Pain    Able to rate subjective pain? yes  -TM      Pre-Treatment Pain Level 2  -TM      Exercise 1    Exercise Name 1 UT stretch  -TM      Reps 1 2  -TM      Time (Seconds) 1 30  -TM      Exercise 2    Exercise Name 2 swim glides  -TM      Sets 2 1  -TM      Reps 2 15  -TM      Exercise 3    Exercise Name 3 chin tucks  -TM      Sets 3 2  -TM      Reps 3 10  -TM      Exercise 4    Exercise  Name 4 scap squeezes  -TM      Sets 4 2  -TM      Reps 4 10  -TM      Exercise 5    Exercise Name 5 chin tucks  -TM      Sets 5 2  -TM      Reps 5 10  -TM      Exercise 6    Exercise Name 6 CS isometric flex, SB at wall w/ball  -TM      Sets 6 1  -TM      Reps 6 10  -TM        User Key  (r) = Recorded By, (t) = Taken By, (c) = Cosigned By    Initials Name Provider Type    TM Mariana Tejada PTA Physical Therapy Assistant                               PT OP Goals       08/29/17 1300       PT Short Term Goals    STG Date to Achieve 08/21/17  -TM     STG 1 Independent in HEP   -TM     STG 1 Progress Ongoing  -TM     STG 2 No pain with AROM of c-spine   -TM     STG 2 Progress Ongoing  -TM     STG 3 Centralize radicular symptoms   -TM     STG 3 Progress Ongoing  -TM     STG 4 NDI 45% or better  -TM     STG 4 Progress Ongoing  -TM     STG 5 Patient to report decreased HA  -TM       User Key  (r) = Recorded By, (t) = Taken By, (c) = Cosigned By    Initials Name Provider Type     Mariana Tejada PTA Physical Therapy Assistant                    Time Calculation:   Start Time: 1300  Stop Time: 1400  Time Calculation (min): 60 min  Total Timed Code Minutes- PT: 40 minute(s)    Therapy Charges for Today     Code Description Service Date Service Provider Modifiers Qty    22380864780 HC PT ELECTRICAL STIM UNATTENDED 8/29/2017 Mariana Tejada, PTA  1    12577949868 HC PT TRACTION CERVICAL 8/29/2017 Mariana Tejada, MEDINA GP 1    87848116549 HC PT THER PROC EA 15 MIN 8/29/2017 Mariana Tejada PTA GP 1                    Mariana Tejada PTA  8/29/2017        Satisfactory

## 2018-08-20 RX ORDER — ESTRADIOL 1 MG/1
TABLET ORAL
Qty: 30 TABLET | Refills: 5 | Status: SHIPPED | OUTPATIENT
Start: 2018-08-20 | End: 2019-02-21 | Stop reason: SDUPTHER

## 2018-08-24 RX ORDER — BACLOFEN 10 MG/1
TABLET ORAL
Qty: 90 TABLET | Refills: 0 | Status: SHIPPED | OUTPATIENT
Start: 2018-08-24 | End: 2018-10-21 | Stop reason: SDUPTHER

## 2018-09-24 RX ORDER — TRAZODONE HYDROCHLORIDE 50 MG/1
TABLET ORAL
Qty: 60 TABLET | Refills: 1 | Status: SHIPPED | OUTPATIENT
Start: 2018-09-24 | End: 2018-10-22 | Stop reason: SDUPTHER

## 2018-10-22 ENCOUNTER — OFFICE VISIT (OUTPATIENT)
Dept: FAMILY MEDICINE CLINIC | Facility: CLINIC | Age: 33
End: 2018-10-22

## 2018-10-22 VITALS
WEIGHT: 130 LBS | DIASTOLIC BLOOD PRESSURE: 78 MMHG | SYSTOLIC BLOOD PRESSURE: 104 MMHG | BODY MASS INDEX: 28.05 KG/M2 | HEIGHT: 57 IN

## 2018-10-22 DIAGNOSIS — Z23 NEED FOR VACCINATION: ICD-10-CM

## 2018-10-22 DIAGNOSIS — Z86.69 HX OF MIGRAINES: ICD-10-CM

## 2018-10-22 DIAGNOSIS — F51.01 PRIMARY INSOMNIA: Primary | ICD-10-CM

## 2018-10-22 DIAGNOSIS — F41.9 ANXIETY: ICD-10-CM

## 2018-10-22 PROCEDURE — 99214 OFFICE O/P EST MOD 30 MIN: CPT | Performed by: NURSE PRACTITIONER

## 2018-10-22 PROCEDURE — 90674 CCIIV4 VAC NO PRSV 0.5 ML IM: CPT | Performed by: NURSE PRACTITIONER

## 2018-10-22 PROCEDURE — 90471 IMMUNIZATION ADMIN: CPT | Performed by: NURSE PRACTITIONER

## 2018-10-22 RX ORDER — TRAZODONE HYDROCHLORIDE 50 MG/1
50-100 TABLET ORAL
Qty: 60 TABLET | Refills: 11 | Status: SHIPPED | OUTPATIENT
Start: 2018-10-22 | End: 2019-11-12 | Stop reason: SDUPTHER

## 2018-10-22 RX ORDER — BACLOFEN 10 MG/1
TABLET ORAL
Qty: 90 TABLET | Refills: 0 | Status: SHIPPED | OUTPATIENT
Start: 2018-10-22 | End: 2018-11-23 | Stop reason: SDUPTHER

## 2018-10-22 RX ORDER — TOPIRAMATE 100 MG/1
100 TABLET, FILM COATED ORAL DAILY
COMMUNITY
End: 2019-02-08

## 2018-10-22 RX ORDER — PAROXETINE 10 MG/1
10 TABLET, FILM COATED ORAL EVERY MORNING
Qty: 30 TABLET | Refills: 11 | Status: SHIPPED | OUTPATIENT
Start: 2018-10-22 | End: 2019-11-12 | Stop reason: SDUPTHER

## 2018-10-22 NOTE — PROGRESS NOTES
Chief Complaint   Patient presents with   • Follow-up     meds   • Flu Vaccine     Subjective   Elliebhavik Saini is a 32 y.o. female.     Constipation   This is a recurrent problem. The current episode started more than 1 year ago. The problem has been gradually worsening since onset. Her stool frequency is 1 time per week or less. The patient is on a high fiber diet. She does not exercise regularly. There has been adequate water intake. Associated symptoms include bloating and nausea. Pertinent negatives include no abdominal pain, anorexia, back pain, diarrhea, fecal incontinence, fever, flatus, hematochezia, hemorrhoids, melena, rectal pain, vomiting or weight loss. Risk factors include dietary change. She has tried diet changes and fiber for the symptoms. The treatment provided no relief. There is no history of abdominal surgery, endocrine disease, inflammatory bowel disease, irritable bowel syndrome, neurologic disease, neuromuscular disease, psychiatric history or radiation treatment.   Headache    This is a chronic problem. The current episode started more than 1 year ago. The problem occurs daily. The problem has been gradually worsening. The pain is located in the bilateral region. The pain quality is similar to prior headaches. The quality of the pain is described as band-like and sharp. The pain is at a severity of 6/10. The pain is moderate. Associated symptoms include dizziness, a loss of balance, nausea and sinus pressure. Pertinent negatives include no abdominal pain, abnormal behavior, anorexia, back pain, blurred vision, coughing, drainage, ear pain, eye pain, eye redness, eye watering, facial sweating, fever, hearing loss, insomnia, muscle aches, neck pain, numbness, phonophobia, photophobia, rhinorrhea, scalp tenderness, seizures, sore throat, swollen glands, tingling, tinnitus, visual change, vomiting, weakness or weight loss. She has tried beta blockers, antidepressants, cold packs,  darkened room, oral narcotics, oxygen, triptans and NSAIDs for the symptoms. The treatment provided mild relief. Her past medical history is significant for migraine headaches and migraines in the family. There is no history of cancer, cluster headaches, hypertension, immunosuppression, obesity, pseudotumor cerebri, recent head traumas, sinus disease or TMJ.   Anxiety   Presents for follow-up visit. Symptoms include decreased concentration, depressed mood, dizziness, excessive worry, irritability, nausea, nervous/anxious behavior and panic. Patient reports no chest pain, compulsions, confusion, dry mouth, feeling of choking, hyperventilation, impotence, insomnia, malaise, muscle tension, palpitations, restlessness, shortness of breath or suicidal ideas. Symptoms occur most days. The severity of symptoms is moderate. The quality of sleep is good.     Compliance with medications is %.        The following portions of the patient's history were reviewed and updated as appropriate: allergies, current medications, past social history and problem list.    Review of Systems   Constitutional: Positive for fatigue, irritability and unexpected weight change. Negative for activity change, appetite change, chills, diaphoresis, fever and weight loss.   HENT: Positive for congestion, postnasal drip and sinus pressure. Negative for dental problem, drooling, ear discharge, ear pain, facial swelling, hearing loss, mouth sores, nosebleeds, rhinorrhea, sinus pain, sneezing, sore throat, tinnitus, trouble swallowing and voice change.    Eyes: Negative.  Negative for blurred vision, photophobia, pain, discharge, redness, itching and visual disturbance.   Respiratory: Negative.  Negative for apnea, cough, choking, chest tightness, shortness of breath, wheezing and stridor.    Cardiovascular: Negative.  Negative for chest pain, palpitations and leg swelling.   Gastrointestinal: Positive for bloating, constipation and nausea.  "Negative for abdominal distention, abdominal pain, anal bleeding, anorexia, blood in stool, diarrhea, flatus, hematochezia, hemorrhoids, melena, rectal pain and vomiting.   Endocrine: Negative.  Negative for cold intolerance, heat intolerance, polydipsia, polyphagia and polyuria.   Genitourinary: Negative.  Negative for impotence.   Musculoskeletal: Negative for arthralgias, back pain, gait problem, joint swelling, myalgias, neck pain and neck stiffness.   Skin: Negative.  Negative for color change, pallor, rash and wound.   Allergic/Immunologic: Negative.  Negative for environmental allergies, food allergies and immunocompromised state.   Neurological: Positive for dizziness, light-headedness, headaches and loss of balance. Negative for tingling, tremors, seizures, syncope, facial asymmetry, speech difficulty, weakness and numbness.   Hematological: Negative.  Negative for adenopathy. Does not bruise/bleed easily.   Psychiatric/Behavioral: Positive for decreased concentration and sleep disturbance. Negative for agitation, behavioral problems, confusion, dysphoric mood, hallucinations, self-injury and suicidal ideas. The patient is nervous/anxious. The patient does not have insomnia and is not hyperactive.    All other systems reviewed and are negative.      Objective   /78   Ht 144.8 cm (57\")   Wt 59 kg (130 lb)   BMI 28.13 kg/m²   Physical Exam   Constitutional: She is oriented to person, place, and time. She appears well-developed and well-nourished. No distress.   HENT:   Head: Normocephalic and atraumatic.   Right Ear: External ear normal.   Left Ear: External ear normal.   Nose: Nose normal.   Mouth/Throat: Oropharynx is clear and moist. No oropharyngeal exudate.   Eyes: Pupils are equal, round, and reactive to light. Conjunctivae and EOM are normal. Right eye exhibits no discharge. Left eye exhibits no discharge. No scleral icterus.   Neck: Normal range of motion. Neck supple. No JVD present. No " tracheal deviation present. No thyromegaly present.   Cardiovascular: Normal rate, regular rhythm, normal heart sounds, intact distal pulses and normal pulses.  Exam reveals no gallop and no friction rub.    No murmur heard.  Pulmonary/Chest: Effort normal and breath sounds normal. No stridor. No respiratory distress. She has no wheezes. She has no rales. She exhibits no tenderness.   Abdominal: Soft. Bowel sounds are normal. She exhibits no distension and no mass. There is no tenderness. There is no rebound and no guarding. No hernia.   Genitourinary: Rectum normal. Pelvic exam was performed with patient supine. Uterus is not deviated, not enlarged, not fixed and not tender. Cervix exhibits no motion tenderness, no discharge and no friability. Right adnexum displays no mass. Left adnexum displays no mass.   Musculoskeletal: Normal range of motion. She exhibits no edema, tenderness or deformity.   Lymphadenopathy:     She has no cervical adenopathy.   Neurological: She is alert and oriented to person, place, and time. She has normal reflexes. She displays normal reflexes. No cranial nerve deficit or sensory deficit. She exhibits normal muscle tone. Coordination normal.   Skin: Skin is warm and dry. No rash noted. She is not diaphoretic. No erythema. No pallor.   Psychiatric: She has a normal mood and affect.   Nursing note and vitals reviewed.      Assessment/Plan   Problem List Items Addressed This Visit        Other    Anxiety    Hx of migraines    Primary insomnia - Primary    Need for vaccination    Relevant Medications    Influenza Vac Subunit Quad (FLUCELVAX) injection 0.5 mL (Completed)           New Medications Ordered This Visit   Medications   • Erenumab-aooe (AIMOVIG) 70 MG/ML prefilled syringe     Sig: Inject 1 mL under the skin into the appropriate area as directed 1 (One) Time for 1 dose.     Dispense:  1 mL     Refill:  11   • traZODone (DESYREL) 50 MG tablet     Sig: Take 1-2 tablets by mouth every  night at bedtime.     Dispense:  60 tablet     Refill:  11   • PARoxetine (PAXIL) 10 MG tablet     Sig: Take 1 tablet by mouth Every Morning.     Dispense:  30 tablet     Refill:  11   • Influenza Vac Subunit Quad (FLUCELVAX) injection 0.5 mL       It's not just what you eat, but when you eat  Eat breakfast, and eat smaller meals throughout the day. A healthy breakfast can jumpstart your metabolism, while eating small, healthy meals (rather than the standard three large meals) keeps your energy up.   Avoid eating at night. Try to eat dinner earlier and fast for 14-16 hours until breakfast the next morning. Studies suggest that eating only when you’re most active and giving your digestive system a long break each day may help to regulate weight.     Stress relief discussed in length. Consider therapy, suggest yoga, exercise, meditation -patient is agrees-will call back if worsen for referral     Add aimovig as directed, diet discussed, meds reviewed, follow up if worsen =patient has tried and failed on multiple meds for headache and is following with neurology for chronic migraines

## 2018-10-23 ENCOUNTER — PRIOR AUTHORIZATION (OUTPATIENT)
Dept: FAMILY MEDICINE CLINIC | Facility: CLINIC | Age: 33
End: 2018-10-23

## 2018-11-26 RX ORDER — BACLOFEN 10 MG/1
TABLET ORAL
Qty: 90 TABLET | Refills: 0 | Status: SHIPPED | OUTPATIENT
Start: 2018-11-26 | End: 2019-02-08

## 2018-12-14 ENCOUNTER — HOSPITAL ENCOUNTER (OUTPATIENT)
Dept: NUCLEAR MEDICINE | Facility: HOSPITAL | Age: 33
Discharge: HOME | End: 2018-12-14

## 2018-12-14 DIAGNOSIS — K30 MILD DIETARY INDIGESTION: ICD-10-CM

## 2018-12-14 PROCEDURE — 0 TECHNETIUM SULFUR COLLOID: Performed by: INTERNAL MEDICINE

## 2018-12-14 PROCEDURE — 78264 GASTRIC EMPTYING IMG STUDY: CPT

## 2018-12-14 PROCEDURE — A9541 TC99M SULFUR COLLOID: HCPCS | Performed by: INTERNAL MEDICINE

## 2018-12-14 RX ADMIN — TECHNETIUM TC 99M SULFUR COLLOID 1 DOSE: KIT at 09:28

## 2019-01-07 RX ORDER — METOCLOPRAMIDE 5 MG/1
5 TABLET ORAL 2 TIMES DAILY
COMMUNITY
End: 2021-02-04 | Stop reason: SDUPTHER

## 2019-01-14 ENCOUNTER — HOSPITAL ENCOUNTER (OUTPATIENT)
Facility: HOSPITAL | Age: 34
Setting detail: HOSPITAL OUTPATIENT SURGERY
Discharge: HOME | End: 2019-01-14
Attending: INTERNAL MEDICINE | Admitting: INTERNAL MEDICINE

## 2019-01-14 ENCOUNTER — ANESTHESIA (OUTPATIENT)
Dept: GASTROENTEROLOGY | Facility: HOSPITAL | Age: 34
End: 2019-01-14

## 2019-01-14 ENCOUNTER — ANESTHESIA EVENT (OUTPATIENT)
Dept: GASTROENTEROLOGY | Facility: HOSPITAL | Age: 34
End: 2019-01-14

## 2019-01-14 VITALS
WEIGHT: 134.13 LBS | HEIGHT: 57 IN | HEART RATE: 93 BPM | TEMPERATURE: 97.6 F | SYSTOLIC BLOOD PRESSURE: 103 MMHG | DIASTOLIC BLOOD PRESSURE: 67 MMHG | OXYGEN SATURATION: 99 % | BODY MASS INDEX: 28.94 KG/M2 | RESPIRATION RATE: 14 BRPM

## 2019-01-14 DIAGNOSIS — K30 ACID INDIGESTION: ICD-10-CM

## 2019-01-14 DIAGNOSIS — R19.4 CHANGE IN BOWEL HABITS: ICD-10-CM

## 2019-01-14 PROCEDURE — 87071 CULTURE AEROBIC QUANT OTHER: CPT | Performed by: INTERNAL MEDICINE

## 2019-01-14 PROCEDURE — 88305 TISSUE EXAM BY PATHOLOGIST: CPT | Performed by: INTERNAL MEDICINE

## 2019-01-14 PROCEDURE — 88305 TISSUE EXAM BY PATHOLOGIST: CPT | Performed by: PATHOLOGY

## 2019-01-14 PROCEDURE — 25010000002 PROPOFOL 10 MG/ML EMULSION: Performed by: NURSE ANESTHETIST, CERTIFIED REGISTERED

## 2019-01-14 RX ORDER — PROPOFOL 10 MG/ML
VIAL (ML) INTRAVENOUS AS NEEDED
Status: DISCONTINUED | OUTPATIENT
Start: 2019-01-14 | End: 2019-01-14 | Stop reason: SURG

## 2019-01-14 RX ORDER — DEXTROSE AND SODIUM CHLORIDE 5; .45 G/100ML; G/100ML
20 INJECTION, SOLUTION INTRAVENOUS CONTINUOUS
Status: DISCONTINUED | OUTPATIENT
Start: 2019-01-14 | End: 2019-01-14 | Stop reason: HOSPADM

## 2019-01-14 RX ORDER — LIDOCAINE HYDROCHLORIDE 10 MG/ML
INJECTION, SOLUTION INFILTRATION; PERINEURAL AS NEEDED
Status: DISCONTINUED | OUTPATIENT
Start: 2019-01-14 | End: 2019-01-14 | Stop reason: SURG

## 2019-01-14 RX ADMIN — PROPOFOL 20 MG: 10 INJECTION, EMULSION INTRAVENOUS at 17:31

## 2019-01-14 RX ADMIN — LIDOCAINE HYDROCHLORIDE 60 MG: 10 INJECTION, SOLUTION INFILTRATION; PERINEURAL at 17:22

## 2019-01-14 RX ADMIN — PROPOFOL 30 MG: 10 INJECTION, EMULSION INTRAVENOUS at 17:28

## 2019-01-14 RX ADMIN — DEXTROSE AND SODIUM CHLORIDE 20 ML/HR: 5; 450 INJECTION, SOLUTION INTRAVENOUS at 15:59

## 2019-01-14 RX ADMIN — PROPOFOL 20 MG: 10 INJECTION, EMULSION INTRAVENOUS at 17:37

## 2019-01-14 RX ADMIN — PROPOFOL 20 MG: 10 INJECTION, EMULSION INTRAVENOUS at 17:34

## 2019-01-14 RX ADMIN — PROPOFOL 50 MG: 10 INJECTION, EMULSION INTRAVENOUS at 17:25

## 2019-01-14 RX ADMIN — PROPOFOL 100 MG: 10 INJECTION, EMULSION INTRAVENOUS at 17:22

## 2019-01-14 NOTE — ANESTHESIA PREPROCEDURE EVALUATION
Anesthesia Evaluation     Patient summary reviewed and Nursing notes reviewed   NPO Solid Status: > 8 hours  NPO Liquid Status: > 2 hours           Airway   Mallampati: II  TM distance: >3 FB  Neck ROM: full  No difficulty expected  Dental - normal exam     Pulmonary - normal exam   Cardiovascular - negative cardio ROS    Rhythm: regular  Rate: normal        Neuro/Psych  GI/Hepatic/Renal/Endo - negative ROS     Musculoskeletal     Abdominal    Substance History      OB/GYN      Comment: HYST      Other - negative ROS                     Anesthesia Plan    ASA 2     intravenous induction   Anesthetic plan, all risks, benefits, and alternatives have been provided, discussed and informed consent has been obtained with: patient.    Plan discussed with CRNA.

## 2019-01-14 NOTE — ANESTHESIA POSTPROCEDURE EVALUATION
Patient: Ellie Saini    Procedure Summary     Date:  01/14/19 Room / Location:  [facility] ENDOSCOPY 2 / [facility] ENDOSCOPY    Anesthesia Start:  1714 Anesthesia Stop:  1740    Procedures:       ESOPHAGOGASTRODUODENOSCOPY (N/A )      COLONOSCOPY (N/A ) Diagnosis:       Acid indigestion      Change in bowel habits      (Acid indigestion [K30])      (Change in bowel habits [R19.4])    Surgeon:  Cong Devine DO Provider:  Ramon Rutherford CRNA    Anesthesia Type:  Not recorded ASA Status:  2          Anesthesia Type: No value filed.  Last vitals  BP   107/70 (01/14/19 1543)   Temp   96.8 °F (36 °C) (01/14/19 1543)   Pulse   78 (01/14/19 1543)   Resp   18 (01/14/19 1543)     SpO2   100 % (01/14/19 1543)     Post Anesthesia Care and Evaluation    Patient location during evaluation: bedside  Patient participation: complete - patient participated  Level of consciousness: awake and alert  Pain score: 0  Pain management: adequate  Airway patency: patent  Anesthetic complications: No anesthetic complications  PONV Status: none  Cardiovascular status: acceptable  Respiratory status: acceptable  Hydration status: acceptable

## 2019-01-14 NOTE — H&P
Sagar Haji DO,[facility]  Gastroenterology  Hepatology  Endoscopy  Board Certified in Internal Medicine and gastroenterology  [address], [address]  [place]. [address]  - [phone]   F - [phone]     GASTROENTEROLOGY HISTORY AND PHYSICAL  NOTE   SAGAR HAJI DO.         SUBJECTIVE:   2019    Name: Ellie Saini  DOD: 1985        Chief Complaint:       Subjective : Functional dyspepsia, abdominal pain, change in bowel habits    Patient is 33 y.o. female presents with desire for elective EGD and colonoscopy.      ROS/HISTORY/ CURRENT MEDICATIONS/OBJECTIVE/VS/PE:   Review of Systems:  All systems unremarkable unless specified below.  Constitutional   HENT  Eyes   Respiratory    Cardiovascular  Gastrointestinal   Endocrine  Genitourinary    Musculoskeletal   Skin  Allergic/Immunologic    Neurological    Hematological  Psychiatric/Behavioral    History:     Past Medical History:   Diagnosis Date   • Acute bronchitis    • Acute maxillary sinusitis    • Acute otitis media    • Acute pharyngitis    • Allergic rhinitis    • Breast lump    • Cramp in limb     Cramp in lower limb - bilateral      • Hormone replacement therapy (HRT)     H/O: hormone replacement (HRT)   • Insomnia    • Lymphadenopathy    • Malaise     Other malaise   • Migraine    • Postoperative visit    • Upper respiratory infection      Past Surgical History:   Procedure Laterality Date   •  SECTION      x2   • COLONOSCOPY  2008   • DIAGNOSTIC LAPAROSCOPY  2014   • LAPAROSCOPIC CHOLECYSTECTOMY  2007   • PAP SMEAR  2009   • PROCEDURE GENERIC CONVERTED  2013    REMOVE INTRAUTERINE DEVICE    • TOTAL ABDOMINAL HYSTERECTOMY WITH SALPINGO OOPHORECTOMY  2014     Family History   Problem Relation Age of Onset   • Suicidality Father    • Diabetes Other    • Stroke Other      Social History     Tobacco Use   • Smoking status: Never Smoker   • Smokeless tobacco: Never Used    Substance Use Topics   • Alcohol use: No   • Drug use: Not on file     Medications Prior to Admission   Medication Sig Dispense Refill Last Dose   • baclofen (LIORESAL) 10 MG tablet TAKE 1 TABLET BY MOUTH TWICE DAILY 90 tablet 0 1/13/2019 at Unknown time   • cetirizine (zyrTEC) 10 MG tablet Take 1 tablet by mouth Daily. 30 tablet 11 1/13/2019 at Unknown time   • diclofenac (VOLTAREN) 50 MG EC tablet TAKE 1 TABLET BY MOUTH TWICE DAILY 60 tablet 2 1/13/2019 at Unknown time   • docusate sodium (COLACE) 100 MG capsule Take 1 capsule by mouth 2 (Two) Times a Day As Needed for Constipation. 60 capsule 5 1/13/2019 at Unknown time   • estradiol (ESTRACE) 1 MG tablet TAKE 1 TABLET BY MOUTH DAILY 30 tablet 5 1/13/2019 at Unknown time   • fluticasone (FLONASE) 50 MCG/ACT nasal spray 2 sprays into each nostril Daily. 16 g 11 1/13/2019 at Unknown time   • guaiFENesin (MUCINEX) 600 MG 12 hr tablet Take 2 tablets by mouth Daily. 14 tablet 11 1/13/2019 at Unknown time   • linaclotide (LINZESS) 290 MCG capsule capsule Take 290 mcg by mouth Every Morning Before Breakfast.   1/13/2019 at Unknown time   • metoclopramide (REGLAN) 5 MG tablet Take 5 mg by mouth 2 (Two) Times a Day.   1/13/2019 at Unknown time   • pantoprazole (PROTONIX) 40 MG EC tablet Take 1 tablet by mouth Daily. 30 tablet 11 1/13/2019 at Unknown time   • PARoxetine (PAXIL) 10 MG tablet Take 1 tablet by mouth Every Morning. 30 tablet 11 1/14/2019 at Unknown time   • topiramate (TOPAMAX) 100 MG tablet Take 100 mg by mouth Daily.   1/13/2019 at Unknown time   • traZODone (DESYREL) 50 MG tablet Take 1-2 tablets by mouth every night at bedtime. 60 tablet 11 1/13/2019 at Unknown time     Allergies:  Patient has no known allergies.    I have reviewed the patients medical history, surgical history and family history in the available medical record system.     Current Medications:     Current Facility-Administered Medications   Medication Dose Route Frequency Provider Last  Rate Last Dose   • dextrose 5 % and sodium chloride 0.45 % infusion  20 mL/hr Intravenous Continuous Cong DevineDO 20 mL/hr at 01/14/19 1559 20 mL/hr at 01/14/19 1559       Objective     Physical Exam:   Temp:  [96.8 °F (36 °C)] 96.8 °F (36 °C)  Heart Rate:  [78] 78  Resp:  [18] 18  BP: (107)/(70) 107/70    Physical Exam:  General Appearance:    Alert, cooperative, in no acute distress   Head:    Normocephalic, without obvious abnormality, atraumatic   Eyes:            Lids and lashes normal, conjunctivae and sclerae normal, no   icterus, no pallor, corneas clear, PERRLA   Ears:    Ears appear intact with no abnormalities noted   Throat:   No oral lesions, no thrush, oral mucosa moist   Neck:   No adenopathy, supple, trachea midline, no thyromegaly, no     carotid bruit, no JVD   Back:     No kyphosis present, no scoliosis present, no skin lesions,       erythema or scars, no tenderness to percussion or                   palpation,   range of motion normal   Lungs:     Clear to auscultation,respirations regular, even and                   unlabored    Heart:    Regular rhythm and normal rate, normal S1 and S2, no            murmur, no gallop, no rub, no click   Breast Exam:    Deferred   Abdomen:     Normal bowel sounds, no masses, no organomegaly, soft        non-tender, non-distended, no guarding, no rebound                 tenderness   Genitalia:    Deferred   Extremities:   Moves all extremities well, no edema, no cyanosis, no              redness   Pulses:   Pulses palpable and equal bilaterally   Skin:   No bleeding, bruising or rash   Lymph nodes:   No palpable adenopathy   Neurologic:   Cranial nerves 2 - 12 grossly intact, sensation intact, DTR        present and equal bilaterally      Results Review:     Lab Results   Component Value Date    WBC 4.65 07/11/2018    WBC 8.13 08/25/2017    WBC 5.9 09/23/2016    HGB 12.8 07/11/2018    HGB 13.3 08/25/2017    HGB 13.0 09/23/2016    HCT 37.2 07/11/2018     HCT 38.8 08/25/2017    HCT 38.7 09/23/2016     07/11/2018     08/25/2017     09/23/2016             No results found for: LIPASE  No results found for: INR       Radiology Review:  Imaging Results (last 72 hours)     ** No results found for the last 72 hours. **           I reviewed the patient's new clinical results.  I reviewed the patient's new imaging results and agree with the interpretation.     ASSESSMENT/PLAN:   ASSESSMENT:   1.  Dyspepsia  2.  Changes in bowel habits  3.  History of diffuse adhesional disease    PLAN:   1.  Esophagogastroduodenoscopy with biopsies and cultures  2.  Colonoscopy    Risk and benefits associated with the procedure are reviewed with the patient.  The patient wishes to proceed      Cong Devine DO  01/14/19  4:49 PM

## 2019-01-16 LAB
BACTERIA SPEC AEROBE CULT: NORMAL
LAB AP CASE REPORT: NORMAL
PATH REPORT.FINAL DX SPEC: NORMAL
PATH REPORT.GROSS SPEC: NORMAL

## 2019-02-08 ENCOUNTER — OFFICE VISIT (OUTPATIENT)
Dept: FAMILY MEDICINE CLINIC | Facility: CLINIC | Age: 34
End: 2019-02-08

## 2019-02-08 VITALS
SYSTOLIC BLOOD PRESSURE: 102 MMHG | BODY MASS INDEX: 29.12 KG/M2 | DIASTOLIC BLOOD PRESSURE: 70 MMHG | WEIGHT: 135 LBS | HEIGHT: 57 IN | OXYGEN SATURATION: 99 %

## 2019-02-08 DIAGNOSIS — K59.09 CHRONIC CONSTIPATION: ICD-10-CM

## 2019-02-08 DIAGNOSIS — K58.1 IRRITABLE BOWEL SYNDROME WITH CONSTIPATION: Primary | ICD-10-CM

## 2019-02-08 PROCEDURE — 99213 OFFICE O/P EST LOW 20 MIN: CPT | Performed by: NURSE PRACTITIONER

## 2019-02-08 RX ORDER — TOPIRAMATE 50 MG/1
50 TABLET, FILM COATED ORAL DAILY
Qty: 30 TABLET | Refills: 11 | Status: SHIPPED | OUTPATIENT
Start: 2019-02-08 | End: 2020-02-05

## 2019-02-08 RX ORDER — ERENUMAB-AOOE 70 MG/ML
INJECTION SUBCUTANEOUS
Refills: 11 | COMMUNITY
Start: 2019-01-18 | End: 2021-02-04

## 2019-02-08 NOTE — PROGRESS NOTES
Chief Complaint   Patient presents with   • Follow-up     has had a chronic cough since having the endoscope     Subjective   Ellie Saini is a 33 y.o. female.     Constipation   This is a recurrent problem. The current episode started more than 1 year ago. The problem has been gradually worsening since onset. Her stool frequency is 1 time per week or less. The patient is on a high fiber diet. She does not exercise regularly. There has been adequate water intake. Associated symptoms include bloating and nausea. Pertinent negatives include no abdominal pain, anorexia, back pain, diarrhea, difficulty urinating, fecal incontinence, flatus, hematochezia, hemorrhoids, melena, rectal pain, vomiting or weight loss. Risk factors include dietary change. She has tried diet changes and fiber for the symptoms. The treatment provided no relief. There is no history of abdominal surgery, endocrine disease, inflammatory bowel disease, irritable bowel syndrome, neurologic disease or radiation treatment.        The following portions of the patient's history were reviewed and updated as appropriate: allergies, current medications, past social history and problem list.    Review of Systems   Constitutional: Negative.  Negative for activity change, appetite change, chills, diaphoresis, fatigue and weight loss.   HENT: Negative for dental problem and drooling.    Eyes: Negative.  Negative for photophobia, pain, discharge, redness, itching and visual disturbance.   Respiratory: Negative.  Negative for apnea and chest tightness.    Cardiovascular: Negative.  Negative for chest pain, palpitations and leg swelling.   Gastrointestinal: Positive for bloating, constipation and nausea. Negative for abdominal distention, abdominal pain, anal bleeding, anorexia, blood in stool, diarrhea, flatus, hematochezia, hemorrhoids, melena, rectal pain and vomiting.   Endocrine: Negative.  Negative for cold intolerance, heat intolerance,  "polydipsia, polyphagia and polyuria.   Genitourinary: Negative.  Negative for difficulty urinating, dyspareunia and dysuria.   Musculoskeletal: Negative.  Negative for back pain, gait problem, joint swelling, myalgias, neck pain and neck stiffness.   Skin: Negative.  Negative for color change and pallor.   Allergic/Immunologic: Negative.  Negative for environmental allergies, food allergies and immunocompromised state.   Neurological: Negative.  Negative for tremors, syncope and weakness.   Hematological: Negative.  Negative for adenopathy. Does not bruise/bleed easily.   Psychiatric/Behavioral: Negative.  Negative for agitation, behavioral problems, confusion, decreased concentration, dysphoric mood, hallucinations and self-injury. The patient is not nervous/anxious and is not hyperactive.        Objective   /70   Ht 144.8 cm (57.01\")   Wt 61.2 kg (135 lb)   SpO2 99%   BMI 29.20 kg/m²   Physical Exam   Constitutional: She is oriented to person, place, and time. She appears well-developed and well-nourished. No distress.   HENT:   Head: Normocephalic and atraumatic.   Right Ear: External ear normal.   Left Ear: External ear normal.   Nose: Nose normal.   Mouth/Throat: Oropharynx is clear and moist. No oropharyngeal exudate.   Eyes: Conjunctivae and EOM are normal. Pupils are equal, round, and reactive to light. Right eye exhibits no discharge. Left eye exhibits no discharge. No scleral icterus.   Neck: Normal range of motion. Neck supple. No JVD present. No tracheal deviation present. No thyromegaly present.   Cardiovascular: Normal rate, regular rhythm, normal heart sounds, intact distal pulses and normal pulses. Exam reveals no gallop and no friction rub.   No murmur heard.  Pulmonary/Chest: Effort normal and breath sounds normal. No stridor. No respiratory distress. She has no wheezes. She has no rales. She exhibits no tenderness.   Abdominal: Soft. Bowel sounds are normal. She exhibits no distension " and no mass. There is no tenderness. There is no rebound and no guarding. No hernia.   Genitourinary: Rectum normal. Pelvic exam was performed with patient supine. Uterus is not deviated, not enlarged, not fixed and not tender. Cervix exhibits no motion tenderness, no discharge and no friability. Right adnexum displays no mass. Left adnexum displays no mass.   Musculoskeletal: Normal range of motion. She exhibits no edema, tenderness or deformity.   Lymphadenopathy:     She has no cervical adenopathy.   Neurological: She is alert and oriented to person, place, and time. She has normal reflexes. She displays normal reflexes. No cranial nerve deficit or sensory deficit. She exhibits normal muscle tone. Coordination normal.   Skin: Skin is warm and dry. No rash noted. She is not diaphoretic. No erythema. No pallor.   Psychiatric: She has a normal mood and affect.   Nursing note and vitals reviewed.      Assessment/Plan   Problem List Items Addressed This Visit        Digestive    Chronic constipation    Irritable bowel syndrome with constipation - Primary           New Medications Ordered This Visit   Medications   • Plecanatide (TRULANCE) 3 MG tablet     Sig: Take 1 tablet by mouth Daily.     Dispense:  30 tablet     Refill:  11   • topiramate (TOPAMAX) 50 MG tablet     Sig: Take 1 tablet by mouth Daily. Decrease dosage to 50mg from 100     Dispense:  30 tablet     Refill:  11      miralax, linzess, colace, laxatives with no relief-patient has tried and failed on all the above and needs to try trulance would benefit from the results

## 2019-02-18 ENCOUNTER — TELEPHONE (OUTPATIENT)
Dept: FAMILY MEDICINE CLINIC | Facility: CLINIC | Age: 34
End: 2019-02-18

## 2019-02-21 RX ORDER — ESTRADIOL 1 MG/1
TABLET ORAL
Qty: 30 TABLET | Refills: 5 | Status: SHIPPED | OUTPATIENT
Start: 2019-02-21 | End: 2019-09-08 | Stop reason: SDUPTHER

## 2019-02-21 RX ORDER — PANTOPRAZOLE SODIUM 40 MG/1
40 TABLET, DELAYED RELEASE ORAL DAILY
Qty: 30 TABLET | Refills: 5 | Status: SHIPPED | OUTPATIENT
Start: 2019-02-21 | End: 2021-02-04 | Stop reason: ALTCHOICE

## 2019-03-22 ENCOUNTER — OFFICE VISIT (OUTPATIENT)
Dept: FAMILY MEDICINE CLINIC | Facility: CLINIC | Age: 34
End: 2019-03-22

## 2019-03-22 VITALS
DIASTOLIC BLOOD PRESSURE: 64 MMHG | HEIGHT: 57 IN | SYSTOLIC BLOOD PRESSURE: 92 MMHG | BODY MASS INDEX: 28.91 KG/M2 | WEIGHT: 134 LBS

## 2019-03-22 DIAGNOSIS — K58.1 IRRITABLE BOWEL SYNDROME WITH CONSTIPATION: Primary | ICD-10-CM

## 2019-03-22 PROCEDURE — 99213 OFFICE O/P EST LOW 20 MIN: CPT | Performed by: NURSE PRACTITIONER

## 2019-03-22 NOTE — PROGRESS NOTES
Chief Complaint   Patient presents with   • Irritable Bowel Syndrome     not any better      Subjective   Elliebhavik Saini is a 33 y.o. female.     Irritable Bowel Syndrome   This is a chronic problem. The current episode started more than 1 year ago. The problem occurs intermittently. The problem has been gradually worsening. Associated symptoms include nausea. Pertinent negatives include no abdominal pain, anorexia, arthralgias, change in bowel habit, chest pain, chills, congestion, coughing, diaphoresis, fatigue, fever, headaches, joint swelling, myalgias, neck pain, numbness, rash, sore throat, swollen glands, urinary symptoms, vertigo, visual change, vomiting or weakness. The treatment provided no relief.   Constipation   This is a recurrent problem. The current episode started more than 1 year ago. The problem has been gradually worsening since onset. Her stool frequency is 1 time per week or less. The patient is on a high fiber diet. She does not exercise regularly. There has been adequate water intake. Associated symptoms include bloating and nausea. Pertinent negatives include no abdominal pain, anorexia, back pain, diarrhea, difficulty urinating, fecal incontinence, fever, flatus, hematochezia, hemorrhoids, melena, rectal pain or vomiting. Risk factors include dietary change. She has tried diet changes and fiber for the symptoms. The treatment provided no relief. Her past medical history is significant for irritable bowel syndrome. There is no history of abdominal surgery, endocrine disease, inflammatory bowel disease, metabolic disease, neurologic disease, neuromuscular disease, psychiatric history or radiation treatment.        The following portions of the patient's history were reviewed and updated as appropriate: allergies, current medications, past social history and problem list.    Review of Systems   Constitutional: Negative.  Negative for activity change, appetite change, chills,  "diaphoresis, fatigue and fever.   HENT: Negative for congestion, dental problem, drooling and sore throat.    Eyes: Negative.  Negative for photophobia, pain, discharge, redness, itching and visual disturbance.   Respiratory: Negative.  Negative for apnea, cough, chest tightness and shortness of breath.    Cardiovascular: Negative.  Negative for chest pain, palpitations and leg swelling.   Gastrointestinal: Positive for abdominal distention, bloating, constipation and nausea. Negative for abdominal pain, anal bleeding, anorexia, blood in stool, change in bowel habit, diarrhea, flatus, hematochezia, hemorrhoids, melena, rectal pain and vomiting.   Endocrine: Negative.  Negative for cold intolerance, heat intolerance, polydipsia, polyphagia and polyuria.   Genitourinary: Negative.  Negative for difficulty urinating, dyspareunia and dysuria.   Musculoskeletal: Negative.  Negative for arthralgias, back pain, gait problem, joint swelling, myalgias, neck pain and neck stiffness.   Skin: Negative.  Negative for color change, pallor and rash.   Allergic/Immunologic: Negative.  Negative for environmental allergies, food allergies and immunocompromised state.   Neurological: Negative.  Negative for dizziness, vertigo, tremors, seizures, syncope, facial asymmetry, speech difficulty, weakness, light-headedness, numbness and headaches.   Hematological: Negative.  Negative for adenopathy. Does not bruise/bleed easily.   Psychiatric/Behavioral: Negative.  Negative for agitation, behavioral problems, confusion, decreased concentration, dysphoric mood, hallucinations and self-injury. The patient is not nervous/anxious and is not hyperactive.        Objective   BP 92/64   Ht 144.8 cm (57\")   Wt 60.8 kg (134 lb)   BMI 29.00 kg/m²   Physical Exam   Constitutional: She is oriented to person, place, and time. She appears well-developed and well-nourished. No distress.   HENT:   Head: Normocephalic and atraumatic.   Right Ear: External " ear normal.   Left Ear: External ear normal.   Nose: Nose normal.   Mouth/Throat: Oropharynx is clear and moist. No oropharyngeal exudate.   Eyes: Conjunctivae and EOM are normal. Pupils are equal, round, and reactive to light. Right eye exhibits no discharge. Left eye exhibits no discharge. No scleral icterus.   Neck: Normal range of motion. Neck supple. No JVD present. No tracheal deviation present. No thyromegaly present.   Cardiovascular: Normal rate, regular rhythm, normal heart sounds, intact distal pulses and normal pulses. Exam reveals no gallop and no friction rub.   No murmur heard.  Pulmonary/Chest: Effort normal and breath sounds normal. No stridor. No respiratory distress. She has no wheezes. She has no rales. She exhibits no tenderness.   Abdominal: Soft. Bowel sounds are normal. She exhibits no distension and no mass. There is no tenderness. There is no rebound and no guarding. No hernia.   Genitourinary: Rectum normal. Pelvic exam was performed with patient supine. Uterus is not deviated, not enlarged, not fixed and not tender. Cervix exhibits no motion tenderness, no discharge and no friability. Right adnexum displays no mass. Left adnexum displays no mass.   Musculoskeletal: Normal range of motion. She exhibits no edema, tenderness or deformity.   Lymphadenopathy:     She has no cervical adenopathy.   Neurological: She is alert and oriented to person, place, and time. She has normal reflexes. She displays normal reflexes. No cranial nerve deficit or sensory deficit. She exhibits normal muscle tone. Coordination normal.   Skin: Skin is warm and dry. No rash noted. She is not diaphoretic. No erythema. No pallor.   Psychiatric: She has a normal mood and affect.   Nursing note and vitals reviewed.      Assessment/Plan   Problem List Items Addressed This Visit        Digestive    Irritable bowel syndrome with constipation - Primary          It's not just what you eat, but when you eat  Eat breakfast,  and eat smaller meals throughout the day. A healthy breakfast can jumpstart your metabolism, while eating small, healthy meals (rather than the standard three large meals) keeps your energy up.   Avoid eating at night. Try to eat dinner earlier and fast for 14-16 hours until breakfast the next morning. Studies suggest that eating only when you’re most active and giving your digestive system a long break each day may help to regulate weight.        No orders of the defined types were placed in this encounter.     patient has tried and failed on linzess all strength, amitiza-colace max dose, miralax max dose, increased fiber in diet, milk of magnesium-no results-updated colonoscopy-patient would benefit from trulance

## 2019-03-28 ENCOUNTER — TELEPHONE (OUTPATIENT)
Dept: FAMILY MEDICINE CLINIC | Facility: CLINIC | Age: 34
End: 2019-03-28

## 2019-05-13 ENCOUNTER — TELEPHONE (OUTPATIENT)
Dept: FAMILY MEDICINE CLINIC | Facility: CLINIC | Age: 34
End: 2019-05-13

## 2019-05-13 NOTE — TELEPHONE ENCOUNTER
Ellie said she went to her appt in [place] and now she is needing direction and can't seem to get anyone to help her, wants Linda to call her    [phone]

## 2019-05-13 NOTE — TELEPHONE ENCOUNTER
Can you find out if this was for her constipation-if so we needs records and what out what they said.

## 2019-05-16 ENCOUNTER — HOSPITAL ENCOUNTER (OUTPATIENT)
Dept: GENERAL RADIOLOGY | Facility: HOSPITAL | Age: 34
Discharge: HOME | End: 2019-05-16
Admitting: INTERNAL MEDICINE

## 2019-05-16 ENCOUNTER — TRANSCRIBE ORDERS (OUTPATIENT)
Dept: ADMINISTRATIVE | Facility: HOSPITAL | Age: 34
End: 2019-05-16

## 2019-05-16 DIAGNOSIS — K59.00 CONSTIPATION, UNSPECIFIED CONSTIPATION TYPE: Primary | ICD-10-CM

## 2019-05-16 PROCEDURE — 74018 RADEX ABDOMEN 1 VIEW: CPT

## 2019-05-18 ENCOUNTER — HOSPITAL ENCOUNTER (OUTPATIENT)
Dept: GENERAL RADIOLOGY | Facility: HOSPITAL | Age: 34
Discharge: HOME | End: 2019-05-18
Admitting: INTERNAL MEDICINE

## 2019-05-18 DIAGNOSIS — R52 PAIN: ICD-10-CM

## 2019-05-18 PROCEDURE — 74018 RADEX ABDOMEN 1 VIEW: CPT

## 2019-05-20 ENCOUNTER — HOSPITAL ENCOUNTER (OUTPATIENT)
Dept: GENERAL RADIOLOGY | Facility: HOSPITAL | Age: 34
Discharge: HOME | End: 2019-05-20
Admitting: INTERNAL MEDICINE

## 2019-05-20 DIAGNOSIS — R10.9 PAIN IN THE ABDOMEN: ICD-10-CM

## 2019-05-20 PROCEDURE — 74018 RADEX ABDOMEN 1 VIEW: CPT

## 2019-05-22 ENCOUNTER — HOSPITAL ENCOUNTER (OUTPATIENT)
Dept: GENERAL RADIOLOGY | Facility: HOSPITAL | Age: 34
Discharge: HOME | End: 2019-05-22
Admitting: INTERNAL MEDICINE

## 2019-05-22 DIAGNOSIS — R52 PAIN: ICD-10-CM

## 2019-05-22 PROCEDURE — 74018 RADEX ABDOMEN 1 VIEW: CPT

## 2019-08-12 RX ORDER — CETIRIZINE HYDROCHLORIDE 10 MG/1
TABLET ORAL
Qty: 30 TABLET | Refills: 6 | Status: SHIPPED | OUTPATIENT
Start: 2019-08-12 | End: 2020-03-05

## 2019-09-09 RX ORDER — ESTRADIOL 1 MG/1
TABLET ORAL
Qty: 30 TABLET | Refills: 4 | Status: SHIPPED | OUTPATIENT
Start: 2019-09-09 | End: 2020-02-04

## 2019-11-12 RX ORDER — PAROXETINE 10 MG/1
10 TABLET, FILM COATED ORAL EVERY MORNING
Qty: 30 TABLET | Refills: 5 | Status: SHIPPED | OUTPATIENT
Start: 2019-11-12 | End: 2021-02-04 | Stop reason: SDUPTHER

## 2019-11-12 RX ORDER — TRAZODONE HYDROCHLORIDE 50 MG/1
TABLET ORAL
Qty: 60 TABLET | Refills: 5 | Status: SHIPPED | OUTPATIENT
Start: 2019-11-12 | End: 2021-02-04

## 2020-02-04 RX ORDER — ESTRADIOL 1 MG/1
TABLET ORAL
Qty: 30 TABLET | Refills: 1 | Status: SHIPPED | OUTPATIENT
Start: 2020-02-04 | End: 2021-02-04 | Stop reason: SDUPTHER

## 2020-02-05 RX ORDER — TOPIRAMATE 50 MG/1
TABLET, FILM COATED ORAL
Qty: 30 TABLET | Refills: 1 | Status: SHIPPED | OUTPATIENT
Start: 2020-02-05 | End: 2021-02-04 | Stop reason: SDUPTHER

## 2020-03-05 RX ORDER — CETIRIZINE HYDROCHLORIDE 10 MG/1
TABLET ORAL
Qty: 30 TABLET | Refills: 0 | Status: SHIPPED | OUTPATIENT
Start: 2020-03-05 | End: 2021-01-04 | Stop reason: SDUPTHER

## 2021-01-04 RX ORDER — CETIRIZINE HYDROCHLORIDE 10 MG/1
10 TABLET ORAL DAILY
Qty: 30 TABLET | Refills: 0 | Status: SHIPPED | OUTPATIENT
Start: 2021-01-04 | End: 2021-02-04 | Stop reason: SDUPTHER

## 2021-02-04 ENCOUNTER — OFFICE VISIT (OUTPATIENT)
Dept: FAMILY MEDICINE CLINIC | Facility: CLINIC | Age: 36
End: 2021-02-04

## 2021-02-04 ENCOUNTER — LAB (OUTPATIENT)
Dept: LAB | Facility: HOSPITAL | Age: 36
End: 2021-02-04

## 2021-02-04 VITALS
HEIGHT: 55 IN | TEMPERATURE: 98.2 F | SYSTOLIC BLOOD PRESSURE: 100 MMHG | WEIGHT: 151 LBS | BODY MASS INDEX: 34.94 KG/M2 | DIASTOLIC BLOOD PRESSURE: 80 MMHG

## 2021-02-04 DIAGNOSIS — Z86.69 HX OF MIGRAINES: Primary | ICD-10-CM

## 2021-02-04 DIAGNOSIS — R53.81 MALAISE: ICD-10-CM

## 2021-02-04 DIAGNOSIS — K59.04 CHRONIC IDIOPATHIC CONSTIPATION: ICD-10-CM

## 2021-02-04 LAB
25(OH)D3 SERPL-MCNC: 40.4 NG/ML (ref 30–100)
ALBUMIN SERPL-MCNC: 4.1 G/DL (ref 3.5–5.2)
ALBUMIN/GLOB SERPL: 1.4 G/DL
ALP SERPL-CCNC: 43 U/L (ref 39–117)
ALT SERPL W P-5'-P-CCNC: 25 U/L (ref 1–33)
ANION GAP SERPL CALCULATED.3IONS-SCNC: 11.1 MMOL/L (ref 5–15)
AST SERPL-CCNC: 21 U/L (ref 1–32)
BASOPHILS # BLD AUTO: 0.07 10*3/MM3 (ref 0–0.2)
BASOPHILS NFR BLD AUTO: 1.1 % (ref 0–1.5)
BILIRUB SERPL-MCNC: 0.2 MG/DL (ref 0–1.2)
BUN SERPL-MCNC: 16 MG/DL (ref 6–20)
BUN/CREAT SERPL: 23.2 (ref 7–25)
CALCIUM SPEC-SCNC: 9.5 MG/DL (ref 8.6–10.5)
CHLORIDE SERPL-SCNC: 106 MMOL/L (ref 98–107)
CHOLEST SERPL-MCNC: 231 MG/DL (ref 0–200)
CO2 SERPL-SCNC: 19.9 MMOL/L (ref 22–29)
CREAT SERPL-MCNC: 0.69 MG/DL (ref 0.57–1)
DEPRECATED RDW RBC AUTO: 38.8 FL (ref 37–54)
EOSINOPHIL # BLD AUTO: 0.24 10*3/MM3 (ref 0–0.4)
EOSINOPHIL NFR BLD AUTO: 3.6 % (ref 0.3–6.2)
ERYTHROCYTE [DISTWIDTH] IN BLOOD BY AUTOMATED COUNT: 12.8 % (ref 12.3–15.4)
GFR SERPL CREATININE-BSD FRML MDRD: 97 ML/MIN/1.73
GLOBULIN UR ELPH-MCNC: 2.9 GM/DL
GLUCOSE SERPL-MCNC: 78 MG/DL (ref 65–99)
HCT VFR BLD AUTO: 40.8 % (ref 34–46.6)
HCV AB SER DONR QL: NORMAL
HDLC SERPL-MCNC: 41 MG/DL (ref 40–60)
HGB BLD-MCNC: 13.9 G/DL (ref 12–15.9)
IMM GRANULOCYTES # BLD AUTO: 0.01 10*3/MM3 (ref 0–0.05)
IMM GRANULOCYTES NFR BLD AUTO: 0.2 % (ref 0–0.5)
IRON 24H UR-MRATE: 100 MCG/DL (ref 37–145)
LDLC SERPL CALC-MCNC: 105 MG/DL (ref 0–100)
LDLC/HDLC SERPL: 2.2 {RATIO}
LYMPHOCYTES # BLD AUTO: 2.04 10*3/MM3 (ref 0.7–3.1)
LYMPHOCYTES NFR BLD AUTO: 30.8 % (ref 19.6–45.3)
MAGNESIUM SERPL-MCNC: 2.2 MG/DL (ref 1.6–2.6)
MCH RBC QN AUTO: 29.2 PG (ref 26.6–33)
MCHC RBC AUTO-ENTMCNC: 34.1 G/DL (ref 31.5–35.7)
MCV RBC AUTO: 85.7 FL (ref 79–97)
MONOCYTES # BLD AUTO: 0.4 10*3/MM3 (ref 0.1–0.9)
MONOCYTES NFR BLD AUTO: 6 % (ref 5–12)
NEUTROPHILS NFR BLD AUTO: 3.87 10*3/MM3 (ref 1.7–7)
NEUTROPHILS NFR BLD AUTO: 58.3 % (ref 42.7–76)
NRBC BLD AUTO-RTO: 0.2 /100 WBC (ref 0–0.2)
PLATELET # BLD AUTO: 327 10*3/MM3 (ref 140–450)
PMV BLD AUTO: 10.6 FL (ref 6–12)
POTASSIUM SERPL-SCNC: 4.2 MMOL/L (ref 3.5–5.2)
PROT SERPL-MCNC: 7 G/DL (ref 6–8.5)
RBC # BLD AUTO: 4.76 10*6/MM3 (ref 3.77–5.28)
SODIUM SERPL-SCNC: 137 MMOL/L (ref 136–145)
TRIGL SERPL-MCNC: 500 MG/DL (ref 0–150)
TSH SERPL DL<=0.05 MIU/L-ACNC: 1.77 UIU/ML (ref 0.27–4.2)
VIT B12 BLD-MCNC: 609 PG/ML (ref 211–946)
VLDLC SERPL-MCNC: 85 MG/DL (ref 5–40)
WBC # BLD AUTO: 6.63 10*3/MM3 (ref 3.4–10.8)

## 2021-02-04 PROCEDURE — 80053 COMPREHEN METABOLIC PANEL: CPT | Performed by: NURSE PRACTITIONER

## 2021-02-04 PROCEDURE — 90471 IMMUNIZATION ADMIN: CPT | Performed by: NURSE PRACTITIONER

## 2021-02-04 PROCEDURE — 84443 ASSAY THYROID STIM HORMONE: CPT | Performed by: NURSE PRACTITIONER

## 2021-02-04 PROCEDURE — 83540 ASSAY OF IRON: CPT | Performed by: NURSE PRACTITIONER

## 2021-02-04 PROCEDURE — 86803 HEPATITIS C AB TEST: CPT | Performed by: NURSE PRACTITIONER

## 2021-02-04 PROCEDURE — 80061 LIPID PANEL: CPT | Performed by: NURSE PRACTITIONER

## 2021-02-04 PROCEDURE — 90686 IIV4 VACC NO PRSV 0.5 ML IM: CPT | Performed by: NURSE PRACTITIONER

## 2021-02-04 PROCEDURE — 85025 COMPLETE CBC W/AUTO DIFF WBC: CPT | Performed by: NURSE PRACTITIONER

## 2021-02-04 PROCEDURE — 82607 VITAMIN B-12: CPT | Performed by: NURSE PRACTITIONER

## 2021-02-04 PROCEDURE — 83735 ASSAY OF MAGNESIUM: CPT | Performed by: NURSE PRACTITIONER

## 2021-02-04 PROCEDURE — 36415 COLL VENOUS BLD VENIPUNCTURE: CPT | Performed by: NURSE PRACTITIONER

## 2021-02-04 PROCEDURE — 99213 OFFICE O/P EST LOW 20 MIN: CPT | Performed by: NURSE PRACTITIONER

## 2021-02-04 PROCEDURE — 82306 VITAMIN D 25 HYDROXY: CPT | Performed by: NURSE PRACTITIONER

## 2021-02-04 RX ORDER — RIZATRIPTAN BENZOATE 10 MG/1
10 TABLET ORAL ONCE AS NEEDED
COMMUNITY
End: 2021-02-04 | Stop reason: SDUPTHER

## 2021-02-04 RX ORDER — ESTRADIOL 1 MG/1
1 TABLET ORAL DAILY
Qty: 30 TABLET | Refills: 11 | Status: SHIPPED | OUTPATIENT
Start: 2021-02-04 | End: 2022-01-12

## 2021-02-04 RX ORDER — DOCUSATE SODIUM 100 MG/1
100 CAPSULE, LIQUID FILLED ORAL 2 TIMES DAILY
Qty: 60 CAPSULE | Refills: 11 | Status: SHIPPED | OUTPATIENT
Start: 2021-02-04 | End: 2022-01-12

## 2021-02-04 RX ORDER — CETIRIZINE HYDROCHLORIDE 10 MG/1
10 TABLET ORAL DAILY
Qty: 30 TABLET | Refills: 11 | Status: SHIPPED | OUTPATIENT
Start: 2021-02-04 | End: 2022-01-12

## 2021-02-04 RX ORDER — FREMANEZUMAB-VFRM 225 MG/1.5ML
INJECTION SUBCUTANEOUS
COMMUNITY
End: 2021-02-04 | Stop reason: SDUPTHER

## 2021-02-04 RX ORDER — FLUTICASONE PROPIONATE 50 MCG
2 SPRAY, SUSPENSION (ML) NASAL DAILY
Qty: 16 G | Refills: 11 | Status: SHIPPED | OUTPATIENT
Start: 2021-02-04 | End: 2022-06-10 | Stop reason: SDUPTHER

## 2021-02-04 RX ORDER — METOCLOPRAMIDE 5 MG/1
5 TABLET ORAL 2 TIMES DAILY
Qty: 60 TABLET | Refills: 11 | Status: SHIPPED | OUTPATIENT
Start: 2021-02-04 | End: 2022-01-25

## 2021-02-04 RX ORDER — CYCLOBENZAPRINE HCL 5 MG
5 TABLET ORAL 3 TIMES DAILY PRN
Qty: 90 TABLET | Refills: 3 | Status: SHIPPED | OUTPATIENT
Start: 2021-02-04 | End: 2022-06-10 | Stop reason: SDUPTHER

## 2021-02-04 RX ORDER — CYCLOBENZAPRINE HCL 5 MG
5 TABLET ORAL 3 TIMES DAILY PRN
COMMUNITY
End: 2021-02-04 | Stop reason: SDUPTHER

## 2021-02-04 RX ORDER — TOPIRAMATE 50 MG/1
100 TABLET, FILM COATED ORAL 2 TIMES DAILY
Qty: 60 TABLET | Refills: 11 | Status: SHIPPED | OUTPATIENT
Start: 2021-02-04 | End: 2021-02-08 | Stop reason: DRUGHIGH

## 2021-02-04 RX ORDER — MELOXICAM 7.5 MG/1
7.5 TABLET ORAL DAILY
Qty: 30 TABLET | Refills: 11 | Status: SHIPPED | OUTPATIENT
Start: 2021-02-04 | End: 2022-01-12

## 2021-02-04 RX ORDER — PAROXETINE 10 MG/1
10 TABLET, FILM COATED ORAL EVERY MORNING
Qty: 30 TABLET | Refills: 5 | Status: SHIPPED | OUTPATIENT
Start: 2021-02-04 | End: 2021-08-04

## 2021-02-04 RX ORDER — FREMANEZUMAB-VFRM 225 MG/1.5ML
1 INJECTION SUBCUTANEOUS
Qty: 4 PEN | Refills: 11 | Status: SHIPPED | OUTPATIENT
Start: 2021-02-04

## 2021-02-04 RX ORDER — MELOXICAM 7.5 MG/1
7.5 TABLET ORAL DAILY
COMMUNITY
End: 2021-02-04 | Stop reason: SDUPTHER

## 2021-02-04 RX ORDER — RIZATRIPTAN BENZOATE 10 MG/1
10 TABLET ORAL ONCE AS NEEDED
Qty: 9 TABLET | Refills: 11 | Status: SHIPPED | OUTPATIENT
Start: 2021-02-04 | End: 2022-06-10 | Stop reason: SDUPTHER

## 2021-02-04 NOTE — PROGRESS NOTES
Chief Complaint   Patient presents with   • General Check up   • Med Refill     Subjective   Ellie Saini is a 35 y.o. female.     Presents with recheck of health care-recently moved back to this area -has been away from this area for 1 year-she has a disc of her recent office visits in [place].     Irritable Bowel Syndrome  This is a chronic problem. The current episode started more than 1 year ago. The problem occurs intermittently. Associated symptoms include fatigue, headaches, nausea and numbness. Pertinent negatives include no abdominal pain, anorexia, arthralgias, change in bowel habit, chest pain, chills, congestion, coughing, diaphoresis, fever, joint swelling, myalgias, neck pain, rash, sore throat, swollen glands, urinary symptoms, vertigo, visual change, vomiting or weakness. The treatment provided no relief.   Constipation  This is a recurrent problem. The current episode started more than 1 year ago. The problem has been gradually worsening since onset. Her stool frequency is 1 time per week or less. The patient is on a high fiber diet. She does not exercise regularly. There has been adequate water intake. Associated symptoms include bloating and nausea. Pertinent negatives include no abdominal pain, anorexia, back pain, diarrhea, difficulty urinating, fecal incontinence, fever, flatus, hematochezia, hemorrhoids, melena, rectal pain or vomiting. Risk factors include dietary change. She has tried diet changes and fiber for the symptoms. The treatment provided no relief. Her past medical history is significant for irritable bowel syndrome. There is no history of abdominal surgery, endocrine disease, inflammatory bowel disease, metabolic disease, neurologic disease, neuromuscular disease, psychiatric history or radiation treatment.   Migraine   This is a recurrent problem. The current episode started more than 1 month ago. The problem occurs daily. The problem has been rapidly worsening.  The pain quality is similar to prior headaches. The quality of the pain is described as band-like. The pain is at a severity of 2/10. The pain is mild. Associated symptoms include dizziness, nausea and numbness. Pertinent negatives include no abdominal pain, anorexia, back pain, coughing, drainage, ear pain, eye pain, eye redness, fever, hearing loss, insomnia, loss of balance, muscle aches, neck pain, photophobia, rhinorrhea, scalp tenderness, seizures, sinus pressure, sore throat, swollen glands, tingling, tinnitus, visual change, vomiting or weakness. The treatment provided mild relief. There is no history of cancer, cluster headaches, hypertension, immunosuppression, migraine headaches, migraines in the family, obesity, pseudotumor cerebri, recent head traumas, sinus disease or TMJ.   Fatigue  This is a recurrent problem. The current episode started more than 1 month ago. The problem occurs daily. The problem has been gradually worsening. Associated symptoms include fatigue, headaches, nausea and numbness. Pertinent negatives include no abdominal pain, anorexia, arthralgias, change in bowel habit, chest pain, chills, congestion, coughing, diaphoresis, fever, joint swelling, myalgias, neck pain, rash, sore throat, swollen glands, urinary symptoms, vertigo, visual change, vomiting or weakness. Exacerbated by: vitamin deficiency  She has tried rest and relaxation for the symptoms. The treatment provided mild relief.        The following portions of the patient's history were reviewed and updated as appropriate: allergies, current medications, past social history and problem list.    Review of Systems   Constitutional: Positive for activity change, appetite change and fatigue. Negative for chills, diaphoresis, fever and unexpected weight change.   HENT: Positive for postnasal drip. Negative for congestion, dental problem, drooling, ear discharge, ear pain, facial swelling, hearing loss, mouth sores, nosebleeds,  "rhinorrhea, sinus pressure, sinus pain, sneezing, sore throat, tinnitus, trouble swallowing and voice change.    Eyes: Negative.  Negative for photophobia, pain, discharge, redness, itching and visual disturbance.   Respiratory: Negative.  Negative for apnea, cough, choking, chest tightness, shortness of breath, wheezing and stridor.    Cardiovascular: Negative.  Negative for chest pain, palpitations and leg swelling.   Gastrointestinal: Positive for abdominal distention, bloating, constipation and nausea. Negative for abdominal pain, anorexia, blood in stool, change in bowel habit, diarrhea, flatus, hematochezia, hemorrhoids, melena, rectal pain and vomiting.   Endocrine: Negative.  Negative for cold intolerance, heat intolerance, polydipsia, polyphagia and polyuria.   Genitourinary: Negative.  Negative for difficulty urinating, dyspareunia and dysuria.   Musculoskeletal: Negative for arthralgias, back pain, gait problem, joint swelling, myalgias, neck pain and neck stiffness.   Skin: Negative.  Negative for color change, pallor, rash and wound.   Allergic/Immunologic: Negative.  Negative for environmental allergies, food allergies and immunocompromised state.   Neurological: Positive for dizziness, light-headedness, numbness and headaches. Negative for vertigo, tingling, tremors, seizures, syncope, facial asymmetry, speech difficulty, weakness and loss of balance.   Hematological: Negative.  Negative for adenopathy. Does not bruise/bleed easily.   Psychiatric/Behavioral: Negative.  Negative for agitation, behavioral problems, confusion, decreased concentration, dysphoric mood, hallucinations, self-injury, sleep disturbance and suicidal ideas. The patient is not nervous/anxious, does not have insomnia and is not hyperactive.        Objective   /80   Temp 98.2 °F (36.8 °C) (Tympanic)   Ht 139.7 cm (55\")   Wt 68.5 kg (151 lb)   BMI 35.10 kg/m²   Physical Exam  Vitals signs and nursing note reviewed. "   Constitutional:       Appearance: Normal appearance. She is obese.   HENT:      Head: Normocephalic and atraumatic.      Right Ear: Tympanic membrane normal. There is no impacted cerumen.      Left Ear: There is no impacted cerumen.      Nose: Nose normal. No congestion or rhinorrhea.      Mouth/Throat:      Mouth: Mucous membranes are moist.      Pharynx: No oropharyngeal exudate or posterior oropharyngeal erythema.   Eyes:      General: No scleral icterus.        Right eye: No discharge.         Left eye: No discharge.      Pupils: Pupils are equal, round, and reactive to light.   Neck:      Musculoskeletal: Normal range of motion. No neck rigidity.   Cardiovascular:      Rate and Rhythm: Normal rate and regular rhythm.      Pulses: Normal pulses.      Heart sounds: Normal heart sounds. No murmur. No friction rub. No gallop.    Pulmonary:      Effort: Pulmonary effort is normal. No respiratory distress.      Breath sounds: Normal breath sounds. No stridor. No wheezing or rhonchi.   Abdominal:      General: Abdomen is flat. Bowel sounds are normal. There is distension.      Palpations: Abdomen is soft. There is no mass.      Tenderness: There is abdominal tenderness. There is no right CVA tenderness, left CVA tenderness, guarding or rebound.      Hernia: No hernia is present.   Musculoskeletal: Normal range of motion.   Lymphadenopathy:      Cervical: No cervical adenopathy.   Skin:     General: Skin is warm and dry.      Coloration: Skin is not jaundiced or pale.      Findings: No bruising or erythema.   Neurological:      General: No focal deficit present.      Mental Status: She is alert and oriented to person, place, and time. Mental status is at baseline.      Cranial Nerves: No cranial nerve deficit.      Sensory: No sensory deficit.      Motor: No weakness.      Coordination: Coordination normal.      Gait: Gait normal.      Deep Tendon Reflexes: Reflexes normal.   Psychiatric:         Mood and Affect:  Mood normal.         Behavior: Behavior normal.         Assessment/Plan   Problems Addressed this Visit        Neuro    Hx of migraines - Primary    Relevant Medications    topiramate (TOPAMAX) 50 MG tablet    rizatriptan (Maxalt) 10 MG tablet    PARoxetine (PAXIL) 10 MG tablet    metoclopramide (REGLAN) 5 MG tablet    meloxicam (MOBIC) 7.5 MG tablet    Fremanezumab-vfrm (Ajovy) 225 MG/1.5ML solution auto-injector    fluticasone (Flonase) 50 MCG/ACT nasal spray    estradiol (ESTRACE) 1 MG tablet    cyclobenzaprine (FLEXERIL) 5 MG tablet    cetirizine (zyrTEC) 10 MG tablet    Other Relevant Orders    Ambulatory Referral to Neurology (Completed)    CBC & Differential    Comprehensive Metabolic Panel    Hepatitis C Antibody    Iron    Lipid Panel    Vitamin D 25 Hydroxy    Vitamin B12    TSH    Magnesium    CBC Auto Differential       Symptoms and Signs    Malaise    Relevant Medications    topiramate (TOPAMAX) 50 MG tablet    rizatriptan (Maxalt) 10 MG tablet    PARoxetine (PAXIL) 10 MG tablet    metoclopramide (REGLAN) 5 MG tablet    meloxicam (MOBIC) 7.5 MG tablet    Fremanezumab-vfrm (Ajovy) 225 MG/1.5ML solution auto-injector    fluticasone (Flonase) 50 MCG/ACT nasal spray    estradiol (ESTRACE) 1 MG tablet    cyclobenzaprine (FLEXERIL) 5 MG tablet    cetirizine (zyrTEC) 10 MG tablet    Other Relevant Orders    Ambulatory Referral to Neurology (Completed)    CBC & Differential    Comprehensive Metabolic Panel    Hepatitis C Antibody    Iron    Lipid Panel    Vitamin D 25 Hydroxy    Vitamin B12    TSH    Magnesium    CBC Auto Differential      Other Visit Diagnoses     Chronic idiopathic constipation        Relevant Orders    CBC & Differential    Comprehensive Metabolic Panel    Hepatitis C Antibody    Iron    Lipid Panel    Vitamin D 25 Hydroxy    Vitamin B12    TSH    Magnesium    Ambulatory Referral to Gastroenterology    CBC Auto Differential      Diagnoses       Codes Comments    Hx of migraines    -  Primary  ICD-10-CM: Z86.69  ICD-9-CM: V12.49     Malaise     ICD-10-CM: R53.81  ICD-9-CM: 780.79     Chronic idiopathic constipation     ICD-10-CM: K59.04  ICD-9-CM: 564.00            New Medications Ordered This Visit   Medications   • topiramate (TOPAMAX) 50 MG tablet     Sig: Take 2 tablets by mouth 2 (Two) Times a Day.     Dispense:  60 tablet     Refill:  11   • rizatriptan (Maxalt) 10 MG tablet     Sig: Take 1 tablet by mouth 1 (One) Time As Needed for Migraine. May repeat in 2 hours if needed     Dispense:  9 tablet     Refill:  11   • PARoxetine (PAXIL) 10 MG tablet     Sig: Take 1 tablet by mouth Every Morning.     Dispense:  30 tablet     Refill:  5   • metoclopramide (REGLAN) 5 MG tablet     Sig: Take 1 tablet by mouth 2 (Two) Times a Day.     Dispense:  60 tablet     Refill:  11   • meloxicam (MOBIC) 7.5 MG tablet     Sig: Take 1 tablet by mouth Daily.     Dispense:  30 tablet     Refill:  11   • Fremanezumab-vfrm (Ajovy) 225 MG/1.5ML solution auto-injector     Sig: Inject 1 application under the skin into the appropriate area as directed Every 30 (Thirty) Days.     Dispense:  4 pen     Refill:  11   • fluticasone (Flonase) 50 MCG/ACT nasal spray     Si sprays into the nostril(s) as directed by provider Daily.     Dispense:  16 g     Refill:  11   • estradiol (ESTRACE) 1 MG tablet     Sig: Take 1 tablet by mouth Daily.     Dispense:  30 tablet     Refill:  11     NEEDS TO BE SEEN, LAST REFILL UNTIL SEEN   • cyclobenzaprine (FLEXERIL) 5 MG tablet     Sig: Take 1 tablet by mouth 3 (Three) Times a Day As Needed for Muscle Spasms. 1 - 2 tablets up to 3 times a day     Dispense:  90 tablet     Refill:  3   • cetirizine (zyrTEC) 10 MG tablet     Sig: Take 1 tablet by mouth Daily.     Dispense:  30 tablet     Refill:  11     Time for Check-up with EDUARDO Michael, Please Call the office to schedule an appointment   • docusate sodium (Colace) 100 MG capsule     Sig: Take 1 capsule by mouth 2 (Two) Times a Day.      Dispense:  60 capsule     Refill:  11       It's not just what you eat, but when you eat  Eat breakfast, and eat smaller meals throughout the day. A healthy breakfast can jumpstart your metabolism, while eating small, healthy meals (rather than the standard three large meals) keeps your energy up.   Avoid eating at night. Try to eat dinner earlier and fast for 14-16 hours until breakfast the next morning. Studies suggest that eating only when you’re most active and giving your digestive system a long break each day may help to regulate weight.     Total hysterectomy -add colace -refill other meds, refer to gastro and neurology since she has moved back to this area, follow up as directed, patient agrees

## 2021-02-05 ENCOUNTER — TELEPHONE (OUTPATIENT)
Dept: FAMILY MEDICINE CLINIC | Facility: CLINIC | Age: 36
End: 2021-02-05

## 2021-02-05 NOTE — PROGRESS NOTES
Per EDUARDO Mckeon, Ms. Saini has been called with recent lab results & recommendations.  Continue current medications and follow-up as planned or sooner if any problems.

## 2021-02-05 NOTE — TELEPHONE ENCOUNTER
Per EDUARDO Mckeon, Ms. Saini has been called with recent lab results & recommendations.  Continue current medications and follow-up as planned or sooner if any problems.      ----- Message from EDUARDO Aguilar sent at 2/5/2021  7:56 AM CST -----  Can you let her know her thyroid is good -cholesterol is a little high but otherwise labs are good, no changes for now

## 2021-02-08 ENCOUNTER — OFFICE VISIT (OUTPATIENT)
Dept: GASTROENTEROLOGY | Facility: CLINIC | Age: 36
End: 2021-02-08

## 2021-02-08 VITALS
DIASTOLIC BLOOD PRESSURE: 86 MMHG | WEIGHT: 151.6 LBS | BODY MASS INDEX: 35.08 KG/M2 | HEIGHT: 55 IN | HEART RATE: 83 BPM | SYSTOLIC BLOOD PRESSURE: 127 MMHG

## 2021-02-08 DIAGNOSIS — K59.09 OTHER CONSTIPATION: ICD-10-CM

## 2021-02-08 DIAGNOSIS — R11.0 NAUSEA: ICD-10-CM

## 2021-02-08 DIAGNOSIS — R10.84 GENERALIZED ABDOMINAL PAIN: Primary | ICD-10-CM

## 2021-02-08 PROCEDURE — 99204 OFFICE O/P NEW MOD 45 MIN: CPT | Performed by: INTERNAL MEDICINE

## 2021-02-08 RX ORDER — POLYETHYLENE GLYCOL 3350 17 G/17G
17 POWDER, FOR SOLUTION ORAL 2 TIMES DAILY
Qty: 60 PACKET | Refills: 6 | Status: SHIPPED | OUTPATIENT
Start: 2021-02-08 | End: 2021-03-10

## 2021-02-08 RX ORDER — TOPIRAMATE 100 MG/1
100 TABLET, FILM COATED ORAL 2 TIMES DAILY
COMMUNITY
End: 2022-06-10

## 2021-02-08 RX ORDER — ESOMEPRAZOLE MAGNESIUM 40 MG/1
40 CAPSULE, DELAYED RELEASE ORAL DAILY
Qty: 30 CAPSULE | Refills: 5 | Status: SHIPPED | OUTPATIENT
Start: 2021-02-08 | End: 2021-08-04

## 2021-02-08 NOTE — PROGRESS NOTES
[facility]      Chief Complaint:   Chief Complaint   Patient presents with   • Chronic Constipation       Subjective     HPI:   Ms. Saini is a 35-year-old  female with past medical history of sinusitis, otitis media, pharyngitis, rhinitis, insomnia, lymphadenopathy, migraine,  section, cholecystectomy, hysterectomy presenting for evaluation for abdominal pain associated with bloating, constipation and nausea.  She has complicated past medical history as well as extensive work-up in the past.  She was seen by Dr. Devine in 2018 and 19 and subsequently had an EGD and colonoscopy which were unremarkable except for hemorrhoids and hiatal hernia.  Subsequent gastric emptying scan was consistent with gastroparesis.  She was then referred to Dr. Jeffers in [facility] motility clinic.  She reports undergoing evaluation there including sitz marker study which was consistent with retained sitz markers and pelvic floor manometry consistent with pelvic floor dysfunction.  She subsequently moved to [place] and year ago and was following up with Dr. Reeves over there.  EGD and colonoscopy by Dr. Reeves last year was unremarkable per patient.  Her most recent gastric emptying scan and Reglan was unremarkable as well.  Patient reports having continued symptoms with postprandial bloating discomfort associated with constipation, nausea, abdominal pain and intermittent vomiting.  She tried MiraLAX, Linzess, Trulance, Amitiza without much help.  She reports taking Reglan, Nexium and Linzess currently.  She takes Flexeril for tension headaches.  Reports gaining 25 pounds weight in the last year.  Denied rectal bleeding or melena.  Also denied NSAID usage.    Past Medical History:   Past Medical History:   Diagnosis Date   • Acute bronchitis    • Acute maxillary sinusitis    • Acute otitis media    • Acute pharyngitis    • Allergic rhinitis    • Breast lump    • Cramp in limb      Cramp in lower limb - bilateral      • Hormone replacement therapy (HRT)     H/O: hormone replacement (HRT)   • Insomnia    • Lymphadenopathy    • Malaise     Other malaise   • Migraine    • Postoperative visit    • Upper respiratory infection        Past Surgical History:  Past Surgical History:   Procedure Laterality Date   •  SECTION      X 2   • COLONOSCOPY  2008   • COLONOSCOPY N/A 2019    Procedure: COLONOSCOPY;  Surgeon: Cong Devine DO;  Location: [facility] ENDOSCOPY;  Service: Gastroenterology   • DIAGNOSTIC LAPAROSCOPY  2014   • ENDOSCOPY N/A 2019    Procedure: ESOPHAGOGASTRODUODENOSCOPY;  Surgeon: Cong Devine DO;  Location: [facility] ENDOSCOPY;  Service: Gastroenterology   • LAPAROSCOPIC CHOLECYSTECTOMY  2007   • PAP SMEAR  2009   • PROCEDURE GENERIC CONVERTED  2013    REMOVE INTRAUTERINE DEVICE    • TOTAL ABDOMINAL HYSTERECTOMY WITH SALPINGO OOPHORECTOMY  2014   • UPPER GASTROINTESTINAL ENDOSCOPY  2019    Dr. Devine       Family History:  Family History   Problem Relation Age of Onset   • Suicidality Father    • Diabetes Other    • Stroke Other        Social History:   reports that she has never smoked. She has never used smokeless tobacco. She reports that she does not drink alcohol or use drugs.    Medications:   Prior to Admission medications    Medication Sig Start Date End Date Taking? Authorizing Provider   cetirizine (zyrTEC) 10 MG tablet Take 1 tablet by mouth Daily. 21  Yes Linda Pennington APRN   cyclobenzaprine (FLEXERIL) 5 MG tablet Take 1 tablet by mouth 3 (Three) Times a Day As Needed for Muscle Spasms. 1 - 2 tablets up to 3 times a day 21  Yes Linda Pennington APRN   docusate sodium (Colace) 100 MG capsule Take 1 capsule by mouth 2 (Two) Times a Day. 21  Yes Linda Pennington APRN   estradiol (ESTRACE) 1 MG tablet Take 1 tablet by mouth Daily. 21  Yes Linda Pennington APRN    fluticasone (Flonase) 50 MCG/ACT nasal spray 2 sprays into the nostril(s) as directed by provider Daily. 2/4/21  Yes Linda Pennington APRN   Fremanezumab-vfrm (Ajovy) 225 MG/1.5ML solution auto-injector Inject 1 application under the skin into the appropriate area as directed Every 30 (Thirty) Days. 2/4/21  Yes Linda Pennington APRN   guaiFENesin (MUCINEX) 600 MG 12 hr tablet Take 2 tablets by mouth Daily.  Patient taking differently: Take 1,200 mg by mouth Daily As Needed. 7/11/18  Yes Linda Pennington APRN   meloxicam (MOBIC) 7.5 MG tablet Take 1 tablet by mouth Daily. 2/4/21  Yes Linda Pennington APRN   metoclopramide (REGLAN) 5 MG tablet Take 1 tablet by mouth 2 (Two) Times a Day. 2/4/21  Yes Linda Pennington APRN   PARoxetine (PAXIL) 10 MG tablet Take 1 tablet by mouth Every Morning. 2/4/21  Yes Linda Pennington APRN   rizatriptan (Maxalt) 10 MG tablet Take 1 tablet by mouth 1 (One) Time As Needed for Migraine. May repeat in 2 hours if needed 2/4/21  Yes Linda Pennington APRN   topiramate (TOPAMAX) 100 MG tablet Take 100 mg by mouth 2 (Two) Times a Day.   Yes Provider, MD Margareth   esomeprazole (nexIUM) 40 MG capsule Take 1 capsule by mouth Daily. 2/8/21   Jorge L Snyder MD   linaclotide (LINZESS) 290 MCG capsule capsule Take 1 capsule by mouth Every Morning Before Breakfast. 2/8/21   Jorge L Snyder MD   polyethylene glycol (MIRALAX) 17 g packet Take 17 g by mouth 2 (Two) Times a Day for 30 days. 2/8/21 3/10/21  Jorge L Snyder MD   docusate sodium (COLACE) 100 MG capsule Take 1 capsule by mouth 2 (Two) Times a Day As Needed for Constipation. 7/11/18 2/8/21  Linda Pennington APRN   topiramate (TOPAMAX) 50 MG tablet Take 2 tablets by mouth 2 (Two) Times a Day. 2/4/21 2/8/21  Linda Pennington APRN       Allergies:  Hydrocodone, Other, and Shrimp    ROS:    Review of Systems   Constitutional: Positive for unexpected weight change. Negative for  "chills, fatigue and fever.   HENT: Negative for congestion, ear discharge, hearing loss, nosebleeds and sore throat.    Eyes: Negative for pain, discharge and redness.   Respiratory: Negative for cough, chest tightness, shortness of breath and wheezing.    Cardiovascular: Negative for chest pain and palpitations.   Gastrointestinal: Positive for abdominal pain, constipation, nausea and vomiting. Negative for abdominal distention, blood in stool and diarrhea.   Endocrine: Negative for cold intolerance, polydipsia, polyphagia and polyuria.   Genitourinary: Negative for dysuria, flank pain, frequency, hematuria and urgency.   Musculoskeletal: Negative for arthralgias, back pain, joint swelling and myalgias.   Skin: Negative for color change, pallor and rash.   Neurological: Negative for tremors, seizures, syncope, weakness and headaches.   Hematological: Negative for adenopathy. Does not bruise/bleed easily.   Psychiatric/Behavioral: Negative for behavioral problems, confusion, dysphoric mood, hallucinations and suicidal ideas. The patient is not nervous/anxious.      Objective     Blood pressure 127/86, pulse 83, height 139.7 cm (55\"), weight 68.8 kg (151 lb 9.6 oz).    Physical Exam  Constitutional:       Appearance: She is well-developed.   HENT:      Head: Normocephalic and atraumatic.   Eyes:      Conjunctiva/sclera: Conjunctivae normal.      Pupils: Pupils are equal, round, and reactive to light.   Neck:      Musculoskeletal: Normal range of motion and neck supple.      Thyroid: No thyromegaly.   Cardiovascular:      Rate and Rhythm: Normal rate and regular rhythm.      Heart sounds: Normal heart sounds. No murmur.   Pulmonary:      Effort: Pulmonary effort is normal.      Breath sounds: Normal breath sounds. No wheezing.   Abdominal:      General: Bowel sounds are normal. There is no distension.      Palpations: Abdomen is soft. There is no mass.      Tenderness: There is no abdominal tenderness.      Hernia: No " hernia is present.   Genitourinary:     Comments: No lesions noted  Musculoskeletal: Normal range of motion.         General: No tenderness.   Lymphadenopathy:      Cervical: No cervical adenopathy.   Skin:     General: Skin is warm and dry.      Findings: No rash.   Neurological:      Mental Status: She is alert and oriented to person, place, and time.      Cranial Nerves: No cranial nerve deficit.   Psychiatric:         Thought Content: Thought content normal.        Extremities: No edema, cyanosis or clubbing.    Assessment/Plan    1.  Abdominal pain with bloating and constipation likely due to irritable bowel syndrome and constipation.  Could also be due to motility disorder given the history of gastroparesis and abnormal sitz marker study.  Obtain records from Dr. Jeffers and Dr. Reeves.  Continue Linzess.  Add Benefiber twice daily.  Increase p.o. water intake to 6 to 8 glasses a day.  Resume MiraLAX twice daily.  Recommend discontinuing Flexeril.  2.  Bloating with nausea and vomiting, likely due to gastroparesis.  Patient seems to be maintaining her weight and is actually gaining weight currently.  Recommend frequent small meals and reducing the portion size.  Obtain results from the recent gastric emptying scan.  She may need repeat evaluation at [facility] motility clinic by Dr. Jeffers.  3.  GERD, continue Nexium.  4.  Pelvic floor dysfunction, obtain ano rectal manometry study results.  She may benefit from pelvic floor physiotherapy.  5.  Obesity with recent weight gain, recommend frequent small meals with decreased portion size.  Recommend moderate exercise 5-6 times a week.  There are no diagnoses linked to this encounter.    * Surgery not found *    No diagnosis found.    Anticipated Surgical Procedure:  No orders of the defined types were placed in this encounter.      The risks, benefits, and alternatives of this procedure have been discussed with the patient or the responsible party-  the patient understands and agrees to proceed.            This document has been electronically signed by Jorge L Snyder MD on February 8, 2021 15:57 CST

## 2021-02-08 NOTE — PATIENT INSTRUCTIONS
MyPlate from USDA    MyPlate is an outline of a general healthy diet based on the 2010 Dietary Guidelines for Americans, from the U.S. Department of Agriculture (USDA). It sets guidelines for how much food you should eat from each food group based on your age, sex, and level of physical activity.  What are tips for following MyPlate?  To follow MyPlate recommendations:  · Eat a wide variety of fruits and vegetables, grains, and protein foods.  · Serve smaller portions and eat less food throughout the day.  · Limit portion sizes to avoid overeating.  · Enjoy your food.  · Get at least 150 minutes of exercise every week. This is about 30 minutes each day, 5 or more days per week.  It can be difficult to have every meal look like MyPlate. Think about MyPlate as eating guidelines for an entire day, rather than each individual meal.  Fruits and vegetables  · Make half of your plate fruits and vegetables.  · Eat many different colors of fruits and vegetables each day.  · For a 2,000 calorie daily food plan, eat:  ? 2½ cups of vegetables every day.  ? 2 cups of fruit every day.  · 1 cup is equal to:  ? 1 cup raw or cooked vegetables.  ? 1 cup raw fruit.  ? 1 medium-sized orange, apple, or banana.  ? 1 cup 100% fruit or vegetable juice.  ? 2 cups raw leafy greens, such as lettuce, spinach, or kale.  ? ½ cup dried fruit.  Grains  · One fourth of your plate should be grains.  · Make at least half of the grains you eat each day whole grains.  · For a 2,000 calorie daily food plan, eat 6 oz of grains every day.  · 1 oz is equal to:  ? 1 slice bread.  ? 1 cup cereal.  ? ½ cup cooked rice, cereal, or pasta.  Protein  · One fourth of your plate should be protein.  · Eat a wide variety of protein foods, including meat, poultry, fish, eggs, beans, nuts, and tofu.  · For a 2,000 calorie daily food plan, eat 5½ oz of protein every day.  · 1 oz is equal to:  ? 1 oz meat, poultry, or fish.  ? ¼ cup cooked beans.  ? 1 egg.  ? ½ oz nuts  or seeds.  ? 1 Tbsp peanut butter.  Dairy  · Drink fat-free or low-fat (1%) milk.  · Eat or drink dairy as a side to meals.  · For a 2,000 calorie daily food plan, eat or drink 3 cups of dairy every day.  · 1 cup is equal to:  ? 1 cup milk, yogurt, cottage cheese, or soy milk (soy beverage).  ? 2 oz processed cheese.  ? 1½ oz natural cheese.  Fats, oils, salt, and sugars  · Only small amounts of oils are recommended.  · Avoid foods that are high in calories and low in nutritional value (empty calories), like foods high in fat or added sugars.  · Choose foods that are low in salt (sodium). Choose foods that have less than 140 milligrams (mg) of sodium per serving.  · Drink water instead of sugary drinks. Drink enough water each day to keep your urine pale yellow.  Where to find support  · Work with your health care provider or a nutrition specialist (dietitian) to develop a customized eating plan that is right for you.  · Download an amando (mobile application) to help you track your daily food intake.  Where to find more information  · Go to ChooseMyPlate.gov for more information.  Summary  · MyPlate is a general guideline for healthy eating from the USDA. It is based on the 2010 Dietary Guidelines for Americans.  · In general, fruits and vegetables should take up ½ of your plate, grains should take up ¼ of your plate, and protein should take up ¼ of your plate.  This information is not intended to replace advice given to you by your health care provider. Make sure you discuss any questions you have with your health care provider.  Document Revised: 05/21/2020 Document Reviewed: 03/19/2018  Elsevier Patient Education © 2020 [employer] Inc.

## 2021-02-18 ENCOUNTER — TELEPHONE (OUTPATIENT)
Dept: FAMILY MEDICINE CLINIC | Facility: CLINIC | Age: 36
End: 2021-02-18

## 2021-02-19 ENCOUNTER — TELEPHONE (OUTPATIENT)
Dept: FAMILY MEDICINE CLINIC | Facility: CLINIC | Age: 36
End: 2021-02-19

## 2021-02-23 ENCOUNTER — TELEPHONE (OUTPATIENT)
Dept: FAMILY MEDICINE CLINIC | Facility: CLINIC | Age: 36
End: 2021-02-23

## 2021-02-23 NOTE — TELEPHONE ENCOUNTER
PATIENT CALLING IN STATES THAT SHE IS NEEDING A REFERRAL TO AN ALLERGIST. STATES THAT ALMOST EVERY TIME SHE EATS SHE IS HAVING TO USE AN INHALER FOLLOWING DUE TO SHORTNESS OF BREATHE     Ellie Saini (Self) [phone] (H)

## 2021-02-24 DIAGNOSIS — Z91.018 FOOD ALLERGY: Primary | ICD-10-CM

## 2021-03-17 DIAGNOSIS — K59.09 CHRONIC CONSTIPATION: Primary | ICD-10-CM

## 2021-03-17 DIAGNOSIS — K58.1 IRRITABLE BOWEL SYNDROME WITH CONSTIPATION: ICD-10-CM

## 2021-07-26 ENCOUNTER — CLINICAL SUPPORT (OUTPATIENT)
Dept: FAMILY MEDICINE CLINIC | Facility: CLINIC | Age: 36
End: 2021-07-26

## 2021-07-26 DIAGNOSIS — Z11.1 PPD SCREENING TEST: Primary | ICD-10-CM

## 2021-07-26 PROCEDURE — 86580 TB INTRADERMAL TEST: CPT | Performed by: FAMILY MEDICINE

## 2021-07-28 LAB
INDURATION: 0 MM (ref 0–10)
Lab: NORMAL
Lab: NORMAL
TB SKIN TEST: NEGATIVE

## 2021-08-04 DIAGNOSIS — R53.81 MALAISE: ICD-10-CM

## 2021-08-04 DIAGNOSIS — Z86.69 HX OF MIGRAINES: ICD-10-CM

## 2021-08-04 RX ORDER — PAROXETINE 10 MG/1
TABLET, FILM COATED ORAL
Qty: 30 TABLET | Refills: 5 | Status: SHIPPED | OUTPATIENT
Start: 2021-08-04 | End: 2022-02-17

## 2021-08-04 RX ORDER — ESOMEPRAZOLE MAGNESIUM 40 MG/1
CAPSULE, DELAYED RELEASE ORAL
Qty: 30 CAPSULE | Refills: 0 | Status: SHIPPED | OUTPATIENT
Start: 2021-08-04

## 2021-08-04 NOTE — TELEPHONE ENCOUNTER
08/04/2021, 1329 - Patient telephoned per this staff member [phone].  Zero answer.  Voice message submitted with date, time, office contact information, and request to contact office at earliest convenience.    Note - Prescription medication renewal request for Esomeprazole 40 MG Capsules received per [facility], [place].  Patient prior clinical appointment with Dr. Jorge L Snyder M.D. 02/08/2021.  Patient cancelled clinical appointments scheduled 03/11/2021 and 03/24/2021.  Patient in need of scheduling and attending clinical appointment prior to receiving additional prescription medication renewals after this one time, one month courtesy renewal.

## 2021-08-30 RX ORDER — ESOMEPRAZOLE MAGNESIUM 40 MG/1
CAPSULE, DELAYED RELEASE ORAL
Qty: 30 CAPSULE | Refills: 0 | OUTPATIENT
Start: 2021-08-30

## 2022-01-05 ENCOUNTER — OFFICE VISIT (OUTPATIENT)
Dept: FAMILY MEDICINE CLINIC | Facility: CLINIC | Age: 37
End: 2022-01-05

## 2022-01-05 VITALS
DIASTOLIC BLOOD PRESSURE: 78 MMHG | OXYGEN SATURATION: 98 % | TEMPERATURE: 97.5 F | SYSTOLIC BLOOD PRESSURE: 118 MMHG | BODY MASS INDEX: 33.09 KG/M2 | HEART RATE: 81 BPM | WEIGHT: 143 LBS | RESPIRATION RATE: 18 BRPM | HEIGHT: 55 IN

## 2022-01-05 DIAGNOSIS — R42 DIZZINESS: Primary | ICD-10-CM

## 2022-01-05 PROCEDURE — 99213 OFFICE O/P EST LOW 20 MIN: CPT | Performed by: NURSE PRACTITIONER

## 2022-01-05 RX ORDER — MECLIZINE HYDROCHLORIDE 25 MG/1
25 TABLET ORAL 3 TIMES DAILY PRN
Qty: 30 TABLET | Refills: 11 | Status: SHIPPED | OUTPATIENT
Start: 2022-01-05

## 2022-01-05 NOTE — PROGRESS NOTES
Chief Complaint   Patient presents with   • Dizziness     Subjective   Ellie Saini is a 36 y.o. female.           Dizziness  This is a new problem. The current episode started more than 1 month ago. The problem occurs intermittently. The problem has been gradually worsening. Associated symptoms include vertigo. Pertinent negatives include no abdominal pain, anorexia, arthralgias, change in bowel habit, chest pain, chills, congestion, coughing, fatigue, fever, headaches, joint swelling, myalgias, neck pain, numbness, rash, sore throat, swollen glands, urinary symptoms, vomiting or weakness. The treatment provided mild relief.        The following portions of the patient's history were reviewed and updated as appropriate: allergies, current medications, past social history and problem list.    Review of Systems   Constitutional: Positive for unexpected weight change. Negative for activity change, appetite change, chills, fatigue and fever.   HENT: Negative.  Negative for congestion and sore throat.    Eyes: Negative.  Negative for photophobia, pain, discharge, redness, itching and visual disturbance.   Respiratory: Negative.  Negative for apnea, cough, choking, chest tightness and shortness of breath.    Cardiovascular: Negative.  Negative for chest pain.   Gastrointestinal: Negative.  Negative for abdominal pain, anorexia, change in bowel habit and vomiting.   Endocrine: Negative.    Genitourinary: Negative.    Musculoskeletal: Negative for arthralgias, joint swelling, myalgias and neck pain.   Skin: Negative.  Negative for rash.   Neurological: Positive for dizziness and vertigo. Negative for seizures, syncope, facial asymmetry, speech difficulty, weakness, light-headedness, numbness and headaches.   Hematological: Negative.  Negative for adenopathy. Does not bruise/bleed easily.   Psychiatric/Behavioral: Negative.  Negative for agitation, behavioral problems, confusion, decreased concentration and  "dysphoric mood.       Objective   /78   Pulse 81   Temp 97.5 °F (36.4 °C)   Resp 18   Ht 139.7 cm (55\")   Wt 64.9 kg (143 lb)   SpO2 98%   BMI 33.24 kg/m²   Physical Exam  Vitals and nursing note reviewed.   Constitutional:       General: She is not in acute distress.     Appearance: Normal appearance. She is not ill-appearing, toxic-appearing or diaphoretic.   HENT:      Head: Normocephalic and atraumatic.      Comments: Fluid bilateral tm's      Right Ear: Tympanic membrane and ear canal normal. There is no impacted cerumen.      Left Ear: Tympanic membrane normal. There is no impacted cerumen.      Nose: Nose normal. No congestion or rhinorrhea.      Mouth/Throat:      Mouth: Mucous membranes are dry.      Pharynx: No oropharyngeal exudate or posterior oropharyngeal erythema.   Eyes:      General: No scleral icterus.        Right eye: No discharge.         Left eye: No discharge.      Pupils: Pupils are equal, round, and reactive to light.   Cardiovascular:      Rate and Rhythm: Normal rate and regular rhythm.      Pulses: Normal pulses.      Heart sounds: Normal heart sounds. No murmur heard.  No friction rub. No gallop.    Pulmonary:      Effort: Pulmonary effort is normal. No respiratory distress.      Breath sounds: Normal breath sounds. No stridor. No wheezing, rhonchi or rales.   Chest:      Chest wall: No tenderness.   Abdominal:      General: Abdomen is flat. Bowel sounds are normal. There is no distension.      Palpations: Abdomen is soft. There is no mass.      Tenderness: There is no abdominal tenderness. There is no right CVA tenderness, left CVA tenderness, guarding or rebound.      Hernia: No hernia is present.   Musculoskeletal:         General: No swelling, deformity or signs of injury. Normal range of motion.      Cervical back: Normal range of motion. No rigidity.      Right lower leg: No edema.      Left lower leg: No edema.   Lymphadenopathy:      Cervical: No cervical adenopathy. "   Skin:     General: Skin is warm and dry.      Coloration: Skin is not jaundiced or pale.      Findings: No bruising, erythema, lesion or rash.   Neurological:      General: No focal deficit present.      Mental Status: She is alert and oriented to person, place, and time. Mental status is at baseline.      Cranial Nerves: No cranial nerve deficit.      Sensory: No sensory deficit.      Motor: No weakness.      Coordination: Coordination normal.      Gait: Gait normal.      Deep Tendon Reflexes: Reflexes normal.      Comments: Dizziness reproducable with exam    Psychiatric:         Mood and Affect: Mood normal.         Behavior: Behavior normal.              Assessment/Plan     Problems Addressed this Visit     None      Visit Diagnoses     Dizziness    -  Primary      Diagnoses       Codes Comments    Dizziness    -  Primary ICD-10-CM: R42  ICD-9-CM: 780.4            New Medications Ordered This Visit   Medications   • meclizine (ANTIVERT) 25 MG tablet     Sig: Take 1 tablet by mouth 3 (Three) Times a Day As Needed for Dizziness (tid prn dizziness).     Dispense:  30 tablet     Refill:  11     Current Outpatient Medications on File Prior to Visit   Medication Sig Dispense Refill   • cetirizine (zyrTEC) 10 MG tablet Take 1 tablet by mouth Daily. 30 tablet 11   • cyclobenzaprine (FLEXERIL) 5 MG tablet Take 1 tablet by mouth 3 (Three) Times a Day As Needed for Muscle Spasms. 1 - 2 tablets up to 3 times a day 90 tablet 3   • docusate sodium (Colace) 100 MG capsule Take 1 capsule by mouth 2 (Two) Times a Day. 60 capsule 11   • esomeprazole (nexIUM) 40 MG capsule TAKE 1 CAPSULE BY MOUTH EVERY DAY 30 capsule 0   • estradiol (ESTRACE) 1 MG tablet Take 1 tablet by mouth Daily. 30 tablet 11   • fluticasone (Flonase) 50 MCG/ACT nasal spray 2 sprays into the nostril(s) as directed by provider Daily. 16 g 11   • Fremanezumab-vfrm (Ajovy) 225 MG/1.5ML solution auto-injector Inject 1 application under the skin into the  appropriate area as directed Every 30 (Thirty) Days. 4 pen 11   • guaiFENesin (MUCINEX) 600 MG 12 hr tablet Take 2 tablets by mouth Daily. (Patient taking differently: Take 1,200 mg by mouth Daily As Needed.) 14 tablet 11   • linaclotide (LINZESS) 290 MCG capsule capsule Take 1 capsule by mouth Every Morning Before Breakfast. 30 capsule 5   • meloxicam (MOBIC) 7.5 MG tablet Take 1 tablet by mouth Daily. 30 tablet 11   • metoclopramide (REGLAN) 5 MG tablet Take 1 tablet by mouth 2 (Two) Times a Day. 60 tablet 11   • PARoxetine (PAXIL) 10 MG tablet TAKE 1 TABLET BY MOUTH EVERY DAY IN THE MORNING 30 tablet 5   • rizatriptan (Maxalt) 10 MG tablet Take 1 tablet by mouth 1 (One) Time As Needed for Migraine. May repeat in 2 hours if needed 9 tablet 11   • topiramate (TOPAMAX) 100 MG tablet Take 100 mg by mouth 2 (Two) Times a Day.       No current facility-administered medications on file prior to visit.       20 minutes   Follow Up   No follow-ups on file.     Add antivert tid prn -meds as directed, follow up if worsen    -otc allergy meds prn

## 2022-01-12 DIAGNOSIS — R53.81 MALAISE: ICD-10-CM

## 2022-01-12 DIAGNOSIS — Z86.69 HX OF MIGRAINES: ICD-10-CM

## 2022-01-12 RX ORDER — TOPIRAMATE 50 MG/1
TABLET, FILM COATED ORAL
Qty: 60 TABLET | Refills: 11 | Status: SHIPPED | OUTPATIENT
Start: 2022-01-12 | End: 2022-06-10

## 2022-01-12 RX ORDER — ESTRADIOL 1 MG/1
TABLET ORAL
Qty: 30 TABLET | Refills: 11 | Status: SHIPPED | OUTPATIENT
Start: 2022-01-12 | End: 2023-01-30

## 2022-01-12 RX ORDER — MELOXICAM 7.5 MG/1
TABLET ORAL
Qty: 30 TABLET | Refills: 11 | Status: SHIPPED | OUTPATIENT
Start: 2022-01-12 | End: 2023-01-30

## 2022-01-12 RX ORDER — DOCUSATE SODIUM 100 MG/1
CAPSULE, LIQUID FILLED ORAL
Qty: 60 CAPSULE | Refills: 11 | Status: SHIPPED | OUTPATIENT
Start: 2022-01-12 | End: 2023-01-30

## 2022-01-12 RX ORDER — CETIRIZINE HYDROCHLORIDE 10 MG/1
TABLET ORAL
Qty: 30 TABLET | Refills: 11 | Status: SHIPPED | OUTPATIENT
Start: 2022-01-12

## 2022-01-25 DIAGNOSIS — Z86.69 HX OF MIGRAINES: ICD-10-CM

## 2022-01-25 DIAGNOSIS — R53.81 MALAISE: ICD-10-CM

## 2022-01-25 RX ORDER — METOCLOPRAMIDE 5 MG/1
TABLET ORAL
Qty: 60 TABLET | Refills: 11 | Status: SHIPPED | OUTPATIENT
Start: 2022-01-25 | End: 2023-01-30

## 2022-02-17 DIAGNOSIS — Z86.69 HX OF MIGRAINES: ICD-10-CM

## 2022-02-17 DIAGNOSIS — R53.81 MALAISE: ICD-10-CM

## 2022-02-17 RX ORDER — PAROXETINE 10 MG/1
TABLET, FILM COATED ORAL
Qty: 30 TABLET | Refills: 5 | Status: SHIPPED | OUTPATIENT
Start: 2022-02-17 | End: 2022-06-10

## 2022-06-10 ENCOUNTER — OFFICE VISIT (OUTPATIENT)
Dept: FAMILY MEDICINE CLINIC | Facility: CLINIC | Age: 37
End: 2022-06-10

## 2022-06-10 VITALS
SYSTOLIC BLOOD PRESSURE: 120 MMHG | OXYGEN SATURATION: 98 % | BODY MASS INDEX: 35.64 KG/M2 | HEIGHT: 55 IN | WEIGHT: 154 LBS | DIASTOLIC BLOOD PRESSURE: 90 MMHG | HEART RATE: 90 BPM

## 2022-06-10 DIAGNOSIS — G43.109 CHRONIC MIGRAINE WITH AURA: Primary | ICD-10-CM

## 2022-06-10 DIAGNOSIS — R41.840 CONCENTRATION DEFICIT: ICD-10-CM

## 2022-06-10 DIAGNOSIS — R53.81 MALAISE: ICD-10-CM

## 2022-06-10 DIAGNOSIS — Z86.69 HX OF MIGRAINES: ICD-10-CM

## 2022-06-10 PROCEDURE — 99213 OFFICE O/P EST LOW 20 MIN: CPT | Performed by: NURSE PRACTITIONER

## 2022-06-10 RX ORDER — CYCLOBENZAPRINE HCL 5 MG
5 TABLET ORAL 3 TIMES DAILY PRN
Qty: 90 TABLET | Refills: 3 | Status: SHIPPED | OUTPATIENT
Start: 2022-06-10 | End: 2022-07-15 | Stop reason: ALTCHOICE

## 2022-06-10 RX ORDER — PAROXETINE HYDROCHLORIDE 20 MG/1
20 TABLET, FILM COATED ORAL EVERY MORNING
Qty: 90 TABLET | Refills: 3 | Status: SHIPPED | OUTPATIENT
Start: 2022-06-10

## 2022-06-10 RX ORDER — RIZATRIPTAN BENZOATE 10 MG/1
10 TABLET ORAL ONCE AS NEEDED
Qty: 9 TABLET | Refills: 11 | Status: SHIPPED | OUTPATIENT
Start: 2022-06-10

## 2022-06-10 RX ORDER — FLUTICASONE PROPIONATE 50 MCG
2 SPRAY, SUSPENSION (ML) NASAL DAILY
Qty: 16 G | Refills: 11 | Status: SHIPPED | OUTPATIENT
Start: 2022-06-10

## 2022-06-10 RX ORDER — TOPIRAMATE 100 MG/1
100 TABLET, FILM COATED ORAL 2 TIMES DAILY
Qty: 60 TABLET | Refills: 11 | Status: SHIPPED | OUTPATIENT
Start: 2022-06-10

## 2022-06-10 NOTE — PROGRESS NOTES
Chief Complaint   Patient presents with   • Headache     Needing migraine injection back    • focusing     Hard time time staying focused     Subjective   Ellie Saini is a 36 y.o. female.           Headache  Providers seen:  Neurologist and psychologist  Lifetime total:  20+  Longest time without a headache:  Days  Number of ER visits for headache:  3  Did ER treatment alleviate headache symptoms?:  Yes  Number of hospitalizations for headaches:  0  Did hospitalization alleviate headache symptoms?:  No  Time of day symptoms are worse:  No specific time of day  Do headaches wake patient from sleep?: No    Days of the week symptoms are worse:  No specific day of the week  Pain severity:  5  Headaches last more than three days?: Yes    Anxiety  Presents for follow-up visit. Symptoms include decreased concentration, dizziness, excessive worry, insomnia, irritability, malaise, nervous/anxious behavior and panic. Patient reports no chest pain, compulsions, confusion, feeling of choking, palpitations, shortness of breath or suicidal ideas. Symptoms occur most days. The severity of symptoms is moderate. The quality of sleep is good.     Compliance with medications is %.   Fatigue  This is a recurrent problem. The current episode started more than 1 month ago. The problem occurs intermittently. The problem has been gradually worsening. Associated symptoms include fatigue. Pertinent negatives include no abdominal pain, arthralgias, chest pain, chills, congestion, coughing, diaphoresis, fever, headaches, joint swelling, myalgias, neck pain, numbness, rash, sore throat, vomiting or weakness. She has tried rest for the symptoms. The treatment provided mild relief.        The following portions of the patient's history were reviewed and updated as appropriate: allergies, current medications, past social history and problem list.    Review of Systems   Constitutional: Positive for activity change, fatigue,  irritability and unexpected weight change. Negative for appetite change, chills, diaphoresis and fever.   HENT: Negative.  Negative for congestion, dental problem, drooling, ear discharge, ear pain, facial swelling, hearing loss, mouth sores, nosebleeds, postnasal drip, rhinorrhea, sinus pressure, sinus pain, sneezing, sore throat, tinnitus, trouble swallowing and voice change.    Eyes: Negative.  Negative for photophobia, pain, discharge, redness, itching and visual disturbance.   Respiratory: Negative.  Negative for apnea, cough, choking, chest tightness, shortness of breath, wheezing and stridor.    Cardiovascular: Negative.  Negative for chest pain, palpitations and leg swelling.   Gastrointestinal: Negative.  Negative for abdominal distention, abdominal pain, anal bleeding, blood in stool, constipation, diarrhea, rectal pain and vomiting.   Endocrine: Negative.  Negative for cold intolerance, heat intolerance, polydipsia, polyphagia and polyuria.   Genitourinary: Negative.  Negative for difficulty urinating, dyspareunia and dysuria.   Musculoskeletal: Negative for arthralgias, back pain, gait problem, joint swelling, myalgias, neck pain and neck stiffness.   Skin: Negative.  Negative for color change, pallor, rash and wound.   Allergic/Immunologic: Negative.  Negative for environmental allergies, food allergies and immunocompromised state.   Neurological: Positive for dizziness. Negative for tremors, seizures, syncope, facial asymmetry, speech difficulty, weakness, light-headedness, numbness and headaches.   Hematological: Negative.  Negative for adenopathy. Does not bruise/bleed easily.   Psychiatric/Behavioral: Positive for agitation, decreased concentration and sleep disturbance. Negative for behavioral problems, confusion, dysphoric mood, hallucinations, self-injury and suicidal ideas. The patient is nervous/anxious and has insomnia. The patient is not hyperactive.        Objective   /90   Pulse 90   " Ht 134.6 cm (53\")   Wt 69.9 kg (154 lb)   SpO2 98%   BMI 38.55 kg/m²   Physical Exam  Vitals and nursing note reviewed.   Constitutional:       General: She is not in acute distress.     Appearance: Normal appearance. She is not ill-appearing, toxic-appearing or diaphoretic.   HENT:      Head: Normocephalic and atraumatic.      Comments: Fluid bilateral tm's      Right Ear: Tympanic membrane and ear canal normal. There is no impacted cerumen.      Left Ear: Tympanic membrane normal. There is no impacted cerumen.      Nose: Nose normal. No congestion or rhinorrhea.      Mouth/Throat:      Mouth: Mucous membranes are dry.      Pharynx: No oropharyngeal exudate or posterior oropharyngeal erythema.   Eyes:      General: No scleral icterus.        Right eye: No discharge.         Left eye: No discharge.      Pupils: Pupils are equal, round, and reactive to light.   Neck:      Vascular: No carotid bruit.   Cardiovascular:      Rate and Rhythm: Normal rate and regular rhythm.      Pulses: Normal pulses.      Heart sounds: Normal heart sounds. No murmur heard.    No friction rub. No gallop.   Pulmonary:      Effort: Pulmonary effort is normal. No respiratory distress.      Breath sounds: Normal breath sounds. No stridor. No wheezing, rhonchi or rales.   Chest:      Chest wall: No tenderness.   Abdominal:      General: Abdomen is flat. Bowel sounds are normal. There is no distension.      Palpations: Abdomen is soft. There is no mass.      Tenderness: There is no abdominal tenderness. There is no right CVA tenderness, left CVA tenderness, guarding or rebound.      Hernia: No hernia is present.   Musculoskeletal:         General: No swelling, deformity or signs of injury. Normal range of motion.      Cervical back: Normal range of motion. No rigidity or tenderness.      Right lower leg: No edema.      Left lower leg: No edema.   Lymphadenopathy:      Cervical: No cervical adenopathy.   Skin:     General: Skin is warm and " dry.      Coloration: Skin is not jaundiced or pale.      Findings: No bruising, erythema, lesion or rash.   Neurological:      General: No focal deficit present.      Mental Status: She is alert and oriented to person, place, and time. Mental status is at baseline.      Cranial Nerves: No cranial nerve deficit.      Sensory: No sensory deficit.      Motor: No weakness.      Coordination: Coordination normal.      Gait: Gait normal.      Deep Tendon Reflexes: Reflexes normal.      Comments: Dizziness reproducable with exam    Psychiatric:         Mood and Affect: Mood normal.         Behavior: Behavior normal.              Assessment & Plan     Problems Addressed this Visit        Neuro    Hx of migraines    Relevant Medications    cyclobenzaprine (FLEXERIL) 5 MG tablet    fluticasone (Flonase) 50 MCG/ACT nasal spray    rizatriptan (Maxalt) 10 MG tablet       Symptoms and Signs    Malaise    Relevant Medications    cyclobenzaprine (FLEXERIL) 5 MG tablet    fluticasone (Flonase) 50 MCG/ACT nasal spray    rizatriptan (Maxalt) 10 MG tablet      Other Visit Diagnoses     Chronic migraine with aura    -  Primary    Relevant Medications    Fremanezumab-vfrm 225 MG/1.5ML solution auto-injector    topiramate (TOPAMAX) 100 MG tablet    cyclobenzaprine (FLEXERIL) 5 MG tablet    rizatriptan (Maxalt) 10 MG tablet    PARoxetine (Paxil) 20 MG tablet    Concentration deficit        Relevant Orders    Ambulatory Referral to Behavioral Health      Diagnoses       Codes Comments    Chronic migraine with aura    -  Primary ICD-10-CM: G43.109  ICD-9-CM: 346.00     Concentration deficit     ICD-10-CM: R41.840  ICD-9-CM: 799.51     Hx of migraines     ICD-10-CM: Z86.69  ICD-9-CM: V12.49     Malaise     ICD-10-CM: R53.81  ICD-9-CM: 780.79            New Medications Ordered This Visit   Medications   • Fremanezumab-vfrm 225 MG/1.5ML solution auto-injector     Sig: Inject 225 mg under the skin into the appropriate area as directed Every 30  (Thirty) Days.     Dispense:  1.5 mL     Refill:  11   • topiramate (TOPAMAX) 100 MG tablet     Sig: Take 1 tablet by mouth 2 (Two) Times a Day.     Dispense:  60 tablet     Refill:  11   • cyclobenzaprine (FLEXERIL) 5 MG tablet     Sig: Take 1 tablet by mouth 3 (Three) Times a Day As Needed for Muscle Spasms. 1 - 2 tablets up to 3 times a day     Dispense:  90 tablet     Refill:  3   • fluticasone (Flonase) 50 MCG/ACT nasal spray     Si sprays into the nostril(s) as directed by provider Daily.     Dispense:  16 g     Refill:  11   • rizatriptan (Maxalt) 10 MG tablet     Sig: Take 1 tablet by mouth 1 (One) Time As Needed for Migraine. May repeat in 2 hours if needed     Dispense:  9 tablet     Refill:  11   • PARoxetine (Paxil) 20 MG tablet     Sig: Take 1 tablet by mouth Every Morning.     Dispense:  90 tablet     Refill:  3     Current Outpatient Medications on File Prior to Visit   Medication Sig Dispense Refill   • cetirizine (zyrTEC) 10 MG tablet TAKE 1 TABLET BY MOUTH EVERY DAY 30 tablet 11   • docusate sodium (COLACE) 100 MG capsule TAKE 1 CAPSULE BY MOUTH TWICE A DAY 60 capsule 11   • esomeprazole (nexIUM) 40 MG capsule TAKE 1 CAPSULE BY MOUTH EVERY DAY 30 capsule 0   • estradiol (ESTRACE) 1 MG tablet TAKE 1 TABLET BY MOUTH EVERY DAY 30 tablet 11   • Fremanezumab-vfrm (Ajovy) 225 MG/1.5ML solution auto-injector Inject 1 application under the skin into the appropriate area as directed Every 30 (Thirty) Days. 4 pen 11   • meclizine (ANTIVERT) 25 MG tablet Take 1 tablet by mouth 3 (Three) Times a Day As Needed for Dizziness (tid prn dizziness). 30 tablet 11   • meloxicam (MOBIC) 7.5 MG tablet TAKE 1 TABLET BY MOUTH EVERY DAY 30 tablet 11   • metoclopramide (REGLAN) 5 MG tablet TAKE 1 TABLET BY MOUTH TWICE A DAY 60 tablet 11   • [DISCONTINUED] cyclobenzaprine (FLEXERIL) 5 MG tablet Take 1 tablet by mouth 3 (Three) Times a Day As Needed for Muscle Spasms. 1 - 2 tablets up to 3 times a day 90 tablet 3   •  [DISCONTINUED] fluticasone (Flonase) 50 MCG/ACT nasal spray 2 sprays into the nostril(s) as directed by provider Daily. 16 g 11   • [DISCONTINUED] PARoxetine (PAXIL) 10 MG tablet TAKE 1 TABLET BY MOUTH EVERY DAY IN THE MORNING 30 tablet 5   • [DISCONTINUED] rizatriptan (Maxalt) 10 MG tablet Take 1 tablet by mouth 1 (One) Time As Needed for Migraine. May repeat in 2 hours if needed 9 tablet 11   • [DISCONTINUED] guaiFENesin (MUCINEX) 600 MG 12 hr tablet Take 2 tablets by mouth Daily. (Patient taking differently: Take 1,200 mg by mouth Daily As Needed.) 14 tablet 11   • [DISCONTINUED] linaclotide (LINZESS) 290 MCG capsule capsule Take 1 capsule by mouth Every Morning Before Breakfast. 30 capsule 5   • [DISCONTINUED] topiramate (TOPAMAX) 100 MG tablet Take 100 mg by mouth 2 (Two) Times a Day.     • [DISCONTINUED] topiramate (TOPAMAX) 50 MG tablet TAKE 2 TABLETS BY MOUTH TWICE A DAY 60 tablet 11     No current facility-administered medications on file prior to visit.      15 minutes   Follow Up   Return in about 3 months (around 9/10/2022).        Has tried and failed on topamax, imitrex, tylenol, ibuprofen     Increase paxil 20mg as directed     Concentration concern and requesting referral for add consult -has taken ajovvy in the past and request to start back on this medication monthly     Patient understands the risks associated with this controlled medication, including tolerance and addiction.  she also agrees to only obtain this medication from me, and not from a another provider, unless that provider is covering for me in my absence.  she also agrees to be compliant in dosing, and not self adjust the dose of medication.  A signed controlled substance agreement is on file, and she has received a controlled substance education sheet at this a previous visit.  she has also signed a consent for treatment with a controlled substance as per [facility] policy. GABRIELA was obtained.

## 2022-07-08 ENCOUNTER — TELEPHONE (OUTPATIENT)
Dept: FAMILY MEDICINE CLINIC | Facility: CLINIC | Age: 37
End: 2022-07-08

## 2022-07-11 ENCOUNTER — TELEMEDICINE (OUTPATIENT)
Dept: PSYCHIATRY | Facility: CLINIC | Age: 37
End: 2022-07-11

## 2022-07-11 DIAGNOSIS — F41.1 GENERALIZED ANXIETY DISORDER: ICD-10-CM

## 2022-07-11 DIAGNOSIS — F33.1 MAJOR DEPRESSIVE DISORDER, RECURRENT EPISODE, MODERATE: Primary | ICD-10-CM

## 2022-07-11 PROCEDURE — 90792 PSYCH DIAG EVAL W/MED SRVCS: CPT | Performed by: NURSE PRACTITIONER

## 2022-07-11 NOTE — PROGRESS NOTES
"This provider is located at [facility], [address] [place] [address], [facility], [id number] using a secure MyChart Video Visit through [employer]. Patient is being seen remotely via telehealth at their home address in [place], and stated they are in a secure environment for this session. The patient's condition being diagnosed/treated is appropriate for telemedicine. The provider identified herself as well as her credentials.   The patient, and/or patients guardian, consent to be seen remotely, and when consent is given they understand that the consent allows for patient identifiable information to be sent to a third party as needed.   They may refuse to be seen remotely at any time. The electronic data is encrypted and password protected, and the patient and/or guardian has been advised of the potential risks to privacy not withstanding such measures.   PT Identifiers used: Name and .    You have chosen to receive care through a telehealth visit.  Do you consent to use a video/audio connection for your medical care today? Yes      Subjective   Ellie Saini is a 36 y.o. female who presents today for initial evaluation   For medication management    Chief Complaint:  \" Really scattered in thinking, forgetful, cannot concentrate.  Has been going on for about a year.\"    History of Present Illness:    Patient reported for appointment on time, reporting from her home.  Reports above chief complaint.  She was referred by primary care provider EDUARDO Maynard.  States she used to be really sharp, able to think clearly.  Reports about a year ago she started getting more scattered, forgetful and having poor concentration.  She has been on Topamax 100 mg twice daily for a \"long time.\"  Denies she is ever had a problem with the medication causing any thought process issues.  Last visit with neurologist was over a year ago.  Reports she had an MRI.  States she has a history of vertigo, not currently experiencing any symptoms.  Has " "been on paroxetine for many years, varying in dosage from 10 to 20 mg.  Currently at 20 mg.  Reports she was put on it initially for hot flashes after her hysterectomy.  She is also taking estradiol for estrogen replacement.  She denied having any history of a head injury or any kind of a seizure.  She does have an extensive history of migraine headaches which started at age 10.  She reports she also has a diagnosis of POTS.  Currently having a lot of fatigue.  Poor concentration and focus.  She chews on her fingernails.  Reporting \"I do not have any fingernails.\"  She reports she has had some weight gain in the last couple years, her eating habits have not changed.  She works at a  locally.  She has done this, work for about 14 years.  Reports sleep varies from 4 hours to 9 hours.  She can fall asleep rather easily, but the maintenance of sleep varies.  She denies any history of any trauma or abuse or neglect.  She has not had Genesight testing and is agreeable to this.  Denies any history of any manic symptoms, no increase in energy activities out of her character.  PHQ-9 today scored a 14, SONIA-7 is scored at 12.  Patient is agreeable to referral for therapy.       The following portions of the patient's history were reviewed and updated as appropriate: allergies, current medications, past family history, past medical history, past social history, past surgical history and problem list.    Past Psychiatric History:  Patient reports beginning treatment about 10 years ago.  Has been on Paxil, between 10 to 20 mg.  Currently on 20 mg.  Has been on Topamax for migraine headaches for a \"long time\" at 100 mg twice a day currently.  Has not seen a medication provider specializing in psychiatry before.  Patient denies any history of head injury or seizure.  Denies any history of self-harm behaviors.  Denies any history of SI, HI, hallucinations or psychosis.  Denies any history of postpartum depression.  Has not " engaged in psychotherapy.      Past Medical History:  Past Medical History:   Diagnosis Date   • Acute bronchitis    • Acute maxillary sinusitis    • Acute otitis media    • Acute pharyngitis    • Allergic rhinitis    • Breast lump    • Cramp in limb     Cramp in lower limb - bilateral      • Hormone replacement therapy (HRT)     H/O: hormone replacement (HRT)   • Insomnia    • Lymphadenopathy    • Malaise     Other malaise   • Migraine    • Postoperative visit    • Upper respiratory infection        Substance Abuse History:   Types:Denies all, including illicit      Social History:  Social History     Socioeconomic History   • Marital status: Single   • Number of children: 3   • Highest education level: Some college, no degree   Tobacco Use   • Smoking status: Never Smoker   • Smokeless tobacco: Never Used   Vaping Use   • Vaping Use: Never used   Substance and Sexual Activity   • Alcohol use: No   • Drug use: Never   • Sexual activity: Defer   Patient reports she has 3 children all of which are special needs.  She has a 15-year-old daughter, 12-year-old twins.  Patient denies any history of  service.  Patient denies any history of legal problems.  Patient reports she has a minimal support system, she reports she does have some friends.  Children's father is not involved in their life.    Family History:  Family History   Problem Relation Age of Onset   • Suicidality Father    • Diabetes Other    • Stroke Other        Past Surgical History:  Past Surgical History:   Procedure Laterality Date   •  SECTION      X 2   • COLONOSCOPY  2008   • COLONOSCOPY N/A 2019    Procedure: COLONOSCOPY;  Surgeon: Cong Devine DO;  Location: [facility] ENDOSCOPY;  Service: Gastroenterology   • DIAGNOSTIC LAPAROSCOPY  2014   • ENDOSCOPY N/A 2019    Procedure: ESOPHAGOGASTRODUODENOSCOPY;  Surgeon: Cong Devine DO;  Location: [facility] ENDOSCOPY;  Service: Gastroenterology   • LAPAROSCOPIC  CHOLECYSTECTOMY  08/28/2007   • PAP SMEAR  03/23/2009   • PROCEDURE GENERIC CONVERTED  01/31/2013    REMOVE INTRAUTERINE DEVICE    • TOTAL ABDOMINAL HYSTERECTOMY WITH SALPINGO OOPHORECTOMY  09/04/2014   • UPPER GASTROINTESTINAL ENDOSCOPY  01/14/2019    Dr. Devine       Problem List:  Patient Active Problem List   Diagnosis   • Bilateral low back pain with right-sided sciatica   • Intractable migraine without aura and without status migrainosus   • Upper respiratory infection, acute   • Neck pain   • Malaise   • Urinary tract infection   • General medical exam   • Anxiety   • Syncope   • Hx of migraines   • Visual changes   • LOC (loss of consciousness) (HCC)   • Ear fullness, bilateral   • Chronic constipation   • Primary insomnia   • Need for vaccination   • Irritable bowel syndrome with constipation       Allergy:   Allergies   Allergen Reactions   • Hydrocodone GI Intolerance   • Other Other (See Comments)     Seasonal Allergies   • Shrimp Unknown - High Severity        Current Medications:   Current Outpatient Medications   Medication Sig Dispense Refill   • cetirizine (zyrTEC) 10 MG tablet TAKE 1 TABLET BY MOUTH EVERY DAY 30 tablet 11   • cyclobenzaprine (FLEXERIL) 5 MG tablet Take 1 tablet by mouth 3 (Three) Times a Day As Needed for Muscle Spasms. 1 - 2 tablets up to 3 times a day 90 tablet 3   • esomeprazole (nexIUM) 40 MG capsule TAKE 1 CAPSULE BY MOUTH EVERY DAY 30 capsule 0   • estradiol (ESTRACE) 1 MG tablet TAKE 1 TABLET BY MOUTH EVERY DAY 30 tablet 11   • fluticasone (Flonase) 50 MCG/ACT nasal spray 2 sprays into the nostril(s) as directed by provider Daily. 16 g 11   • Fremanezumab-vfrm 225 MG/1.5ML solution auto-injector Inject 225 mg under the skin into the appropriate area as directed Every 30 (Thirty) Days. 1.5 mL 11   • meloxicam (MOBIC) 7.5 MG tablet TAKE 1 TABLET BY MOUTH EVERY DAY 30 tablet 11   • metoclopramide (REGLAN) 5 MG tablet TAKE 1 TABLET BY MOUTH TWICE A DAY 60 tablet 11   •  PARoxetine (Paxil) 20 MG tablet Take 1 tablet by mouth Every Morning. 90 tablet 3   • rizatriptan (Maxalt) 10 MG tablet Take 1 tablet by mouth 1 (One) Time As Needed for Migraine. May repeat in 2 hours if needed 9 tablet 11   • topiramate (TOPAMAX) 100 MG tablet Take 1 tablet by mouth 2 (Two) Times a Day. 60 tablet 11   • docusate sodium (COLACE) 100 MG capsule TAKE 1 CAPSULE BY MOUTH TWICE A DAY 60 capsule 11   • Fremanezumab-vfrm (Ajovy) 225 MG/1.5ML solution auto-injector Inject 1 application under the skin into the appropriate area as directed Every 30 (Thirty) Days. 4 pen 11   • meclizine (ANTIVERT) 25 MG tablet Take 1 tablet by mouth 3 (Three) Times a Day As Needed for Dizziness (tid prn dizziness). 30 tablet 11     No current facility-administered medications for this visit.       Review of Systems:    Review of Systems   Constitutional: Positive for fatigue and unexpected weight gain.   Neurological: Positive for headache.   Psychiatric/Behavioral: Positive for decreased concentration and depressed mood. The patient is nervous/anxious.    All other systems reviewed and are negative.        Physical Exam:   Physical Exam  Vitals reviewed.   Constitutional:       Appearance: Normal appearance.   HENT:      Head: Normocephalic.   Neurological:      Mental Status: She is alert.   Psychiatric:         Attention and Perception: Attention and perception normal.         Mood and Affect: Mood is anxious and depressed. Affect is flat.         Speech: Speech normal.         Behavior: Behavior normal. Behavior is cooperative.         Thought Content: Thought content normal.         Cognition and Memory: Cognition and memory normal.         Judgment: Judgment normal.         Vitals:  There were no vitals taken for this visit. There is no height or weight on file to calculate BMI.  Due to extenuating circumstances and possible current health risks associated with the patient being present in a clinical setting (with  current health restrictions in place in regards to possible COVID 19 transmission/exposure), the patient was seen remotely today via a MyChart Video Visit through [facility] and telephone encounter.  Unable to obtain vital signs due to nature of remote visit.  Height stated at 53 inches.  Weight stated at 154pounds.    Last 3 Blood Pressure Readings:  BP Readings from Last 3 Encounters:   06/10/22 120/90   01/05/22 118/78   02/08/21 127/86       PHQ-9 Score:   PHQ-9 Total Score: (P) 14  PHQ-9 Depression Screening  Little interest or pleasure in doing things? (P) 1   Feeling down, depressed, or hopeless? (P) 1   Trouble falling or staying asleep, or sleeping too much? (P) 0   Feeling tired or having little energy? (P) 3   Poor appetite or overeating? (P) 0   Feeling bad about yourself - or that you are a failure or have let yourself or your family down? (P) 3   Trouble concentrating on things, such as reading the newspaper or watching television? (P) 3   Moving or speaking so slowly that other people could have noticed? Or the opposite - being so fidgety or restless that you have been moving around a lot more than usual? (P) 3   Thoughts that you would be better off dead, or of hurting yourself in some way? (P) 0   PHQ-9 Total Score (P) 14   If you checked off any problems, how difficult have these problems made it for you to do your work, take care of things at home, or get along with other people? (P) Extremely dIfficult     PHQ-9 Total Score: (P) 14      Feeling nervous, anxious or on edge: (P) More than half the days  Not being able to stop or control worrying: (P) More than half the days  Worrying too much about different things: (P) Several days  Trouble Relaxing: (P) More than half the days  Being so restless that it is hard to sit still: (P) Nearly every day  Feeling afraid as if something awful might happen: (P) Several days  Becoming easily annoyed or irritable: (P) Several days  SONIA 7 Total Score: (P) 12  If you  checked any problems, how difficult have these problems made it for you to do your work, take care of things at home, or get along with other people: (P) Very difficult      PROMIS scale screening tool that patient filled out virtually reviewed by this APRN at today's encounter.      Mental Status Exam:   Hygiene:   good  Cooperation:  Cooperative  Eye Contact:  Fair  Psychomotor Behavior:  Chewing on fingernails at times  Affect:  Flat  Mood: depressed and anxious  Hopelessness: Denies  Speech:  Normal  Thought Process:  Goal directed and Linear  Thought Content:  Normal  Suicidal:  None  Homicidal:  None  Hallucinations:  None  Delusion:  None  Memory:  Intact  Orientation:  Person, Place, Time and Situation  Reliability:  good  Insight:  Fair  Judgement:  Fair  Impulse Control:  Good  Physical/Medical Issues:  Yes Chronic health issues including migraine headaches       Lab Results:   No visits with results within 6 Month(s) from this visit.   Latest known visit with results is:   Clinical Support on 07/26/2021   Component Date Value Ref Range Status   • TB Skin Test 07/28/2021 Negative   Final   • Induration 07/28/2021 0  0 - 10 mm Final   • Injection Date & Time 07/28/2021 07/26/2021   Final   • Read Date & Time 07/28/2021 02/28/2021   Final       Assessment & Plan   Diagnoses and all orders for this visit:    1. Major depressive disorder, recurrent episode, moderate (HCC) (Primary)  -     GeneSight - Swab,; Future  -     Ambulatory Referral to Behavioral Health    2. Generalized anxiety disorder  -     GeneSight - Swab,; Future  -     Ambulatory Referral to Behavioral Health        Visit Diagnoses:    ICD-10-CM ICD-9-CM   1. Major depressive disorder, recurrent episode, moderate (HCC)  F33.1 296.32   2. Generalized anxiety disorder  F41.1 300.02         GOALS:  Short Term Goals: Patient will be compliant with medication, and patient will have no significant medication related side effects.  Patient will be  "engaged in psychotherapy as indicated.  Patient will report subjective improvement of symptoms.  Long term goals: To stabilize mood and treat/improve subjective symptoms, the patient will stay out of the hospital, the patient will be at an optimal level of functioning, and the patient will take all medications as prescribed.  The patient/guardian verbalized understanding and agreement with goals that were mutually set.    RISK ASSESSMENT  Current harm-to-self risk is reported by pt as \"none.\"  Current [id number] risk is reported by pt as \"none.\"   No suicidal thoughts, intent, plan is appreciated by this provider on this date of exam.   No homicidal thoughts, intent, plan is appreciated by this provider on this date.    TREATMENT PLAN: Continue supportive psychotherapy efforts and medications as indicated.   Pharmacological and Non-Pharmacological treatment options discussed during today's visit. Patient/Guardian acknowledged and verbally consented with current treatment plan and was educated on the importance of compliance with treatment and follow-up appointments.      MEDICATION ISSUES:  Discussed medication options and treatment plan of prescribed medication as well as the risks, benefits, any black box warnings, and side effects including potential falls, possible impaired driving, and metabolic adversities among others. Patient is agreeable to call the office with any worsening of symptoms or onset of side effects, or if any concerns or questions arise.  The contact information for the office is made available to the patient. Patient is agreeable to call 911 or go to the nearest ER should they begin having any SI/HI, or if any urgent concerns arise. No medication side effects or related complaints today.     1.  Continue paroxetine 20 mg daily  2 Genesight testing ordered  3 referral made for therapy  Treatment plan deferred until next encounter    MEDS ORDERED DURING VISIT:  No orders of the defined types " were placed in this encounter.      Follow Up Appointment:   Return in about 2 weeks (around 7/25/2022) for Recheck, Video visit.               This document has been electronically signed by EDUARDO Oneill  July 11, 2022 15:51 EDT    Please note that portions of this note were completed with a voice recognition program. Efforts were made to edit dictation, but occasionally words are mistranscribed.

## 2022-07-15 ENCOUNTER — OFFICE VISIT (OUTPATIENT)
Dept: FAMILY MEDICINE CLINIC | Facility: CLINIC | Age: 37
End: 2022-07-15

## 2022-07-15 VITALS
HEART RATE: 109 BPM | OXYGEN SATURATION: 97 % | TEMPERATURE: 97.3 F | SYSTOLIC BLOOD PRESSURE: 119 MMHG | BODY MASS INDEX: 35.29 KG/M2 | HEIGHT: 55 IN | WEIGHT: 152.5 LBS | DIASTOLIC BLOOD PRESSURE: 86 MMHG

## 2022-07-15 DIAGNOSIS — M25.511 ACUTE PAIN OF RIGHT SHOULDER: Primary | ICD-10-CM

## 2022-07-15 DIAGNOSIS — M62.838 MUSCLE SPASM OF RIGHT SHOULDER: ICD-10-CM

## 2022-07-15 PROCEDURE — 99213 OFFICE O/P EST LOW 20 MIN: CPT | Performed by: NURSE PRACTITIONER

## 2022-07-15 RX ORDER — TIZANIDINE HYDROCHLORIDE 4 MG/1
4 CAPSULE, GELATIN COATED ORAL 3 TIMES DAILY PRN
Qty: 90 CAPSULE | Refills: 0 | Status: SHIPPED | OUTPATIENT
Start: 2022-07-15 | End: 2022-08-11 | Stop reason: SDUPTHER

## 2022-07-15 NOTE — PROGRESS NOTES
"Subjective   Ellie Saini is a 36 y.o. female. Shoulder pain (right)    History of Present Illness   Shoulder Pain  Patient complains of right shoulder pain. The symptoms began few weeks ago. Aggravating factors: \"raising my arm above head\". Pain is located diffusely throughout the shoulder. Discomfort is described as throbbing. Symptoms are exacerbated by overhead movements. Evaluation to date: none. Therapy to date includes: flexeril. Can only take flexeril at night due to drowsiness.    The following portions of the patient's history were reviewed and updated as appropriate: allergies, current medications, past family history, past medical history, past social history, past surgical history, and problem list.    Review of Systems   Constitutional: Negative for chills, fatigue and fever.   HENT: Negative for congestion, ear pain, rhinorrhea and sore throat.    Eyes: Negative for blurred vision, double vision and visual disturbance.   Respiratory: Negative for cough, chest tightness, shortness of breath and wheezing.    Cardiovascular: Negative for chest pain, palpitations and leg swelling.   Gastrointestinal: Negative for abdominal pain, diarrhea, nausea and vomiting.   Endocrine: Negative for cold intolerance and heat intolerance.   Genitourinary: Negative for difficulty urinating, dysuria, frequency and hematuria.   Musculoskeletal: Positive for arthralgias (right shoulder). Negative for back pain, neck pain and neck stiffness.   Skin: Negative for dry skin, pallor, rash, skin lesions and wound.   Allergic/Immunologic: Negative for environmental allergies, food allergies and immunocompromised state.   Neurological: Negative for dizziness, syncope, weakness, light-headedness, headache and confusion.   Hematological: Negative for adenopathy. Does not bruise/bleed easily.   Psychiatric/Behavioral: Negative for self-injury, sleep disturbance, suicidal ideas, depressed mood and stress. The patient is not " nervous/anxious.        Vitals:    07/15/22 1121   BP: 119/86   Pulse: 109   Temp: 97.3 °F (36.3 °C)   SpO2: 97%     Body mass index is 38.17 kg/m².    Objective   Physical Exam  Constitutional:       General: She is not in acute distress.     Appearance: She is well-developed. She is not ill-appearing or diaphoretic.   HENT:      Head: Normocephalic.   Eyes:      General: Lids are normal.      Conjunctiva/sclera: Conjunctivae normal.      Pupils: Pupils are equal, round, and reactive to light.   Musculoskeletal:      Right shoulder: Decreased range of motion.      Cervical back: Full passive range of motion without pain, normal range of motion and neck supple.   Skin:     General: Skin is warm and dry.      Coloration: Skin is not pale.   Neurological:      Mental Status: She is alert and oriented to person, place, and time.      Coordination: Coordination normal.      Gait: Gait normal.   Psychiatric:         Speech: Speech normal.         Behavior: Behavior normal. Behavior is cooperative.         Thought Content: Thought content normal.         Judgment: Judgment normal.           Assessment & Plan   Diagnoses and all orders for this visit:    1. Acute pain of right shoulder (Primary)  -     XR Shoulder 2+ View Right (In Office)    2. Muscle spasm of right shoulder  -     TiZANidine (ZANAFLEX) 4 MG capsule; Take 1 capsule by mouth 3 (Three) Times a Day As Needed for Muscle Spasms.  Dispense: 90 capsule; Refill: 0    Exam revealed loss of ROM in right shoulder.  Xrays ordered to further evaluate, will call pt with results.  Offered referral to Ortho, pt would like to see what xrays show first.  Recommended rest, ice (pain), heat (muscle tightness), can take tylenol otc as directed PRN for pain.  Take zanaflex 4 mg 1 capsule TID PRN for muscle spasms.  If symptoms do not improve or worsen, patient was instructed to return to clinic for further evaluation.         This document has been electronically signed by  Martha Johns, APRN on  July 15, 2022 12:57 CDT

## 2022-08-11 DIAGNOSIS — M62.838 MUSCLE SPASM OF RIGHT SHOULDER: ICD-10-CM

## 2022-08-11 RX ORDER — TIZANIDINE HYDROCHLORIDE 4 MG/1
4 CAPSULE, GELATIN COATED ORAL 3 TIMES DAILY PRN
Qty: 90 CAPSULE | Refills: 0 | Status: SHIPPED | OUTPATIENT
Start: 2022-08-11

## 2022-08-24 ENCOUNTER — APPOINTMENT (OUTPATIENT)
Dept: GENERAL RADIOLOGY | Facility: HOSPITAL | Age: 37
End: 2022-08-24

## 2022-08-24 ENCOUNTER — HOSPITAL ENCOUNTER (EMERGENCY)
Facility: HOSPITAL | Age: 37
Discharge: HOME | End: 2022-08-24
Attending: EMERGENCY MEDICINE | Admitting: EMERGENCY MEDICINE

## 2022-08-24 ENCOUNTER — CLINICAL SUPPORT (OUTPATIENT)
Dept: FAMILY MEDICINE CLINIC | Facility: CLINIC | Age: 37
End: 2022-08-24

## 2022-08-24 ENCOUNTER — APPOINTMENT (OUTPATIENT)
Dept: CT IMAGING | Facility: HOSPITAL | Age: 37
End: 2022-08-24

## 2022-08-24 VITALS
BODY MASS INDEX: 32.4 KG/M2 | HEIGHT: 55 IN | SYSTOLIC BLOOD PRESSURE: 105 MMHG | HEART RATE: 98 BPM | WEIGHT: 140 LBS | RESPIRATION RATE: 19 BRPM | DIASTOLIC BLOOD PRESSURE: 59 MMHG | OXYGEN SATURATION: 98 % | TEMPERATURE: 98.5 F

## 2022-08-24 DIAGNOSIS — Z11.1 PPD SCREENING TEST: Primary | ICD-10-CM

## 2022-08-24 DIAGNOSIS — R11.2 NAUSEA VOMITING AND DIARRHEA: ICD-10-CM

## 2022-08-24 DIAGNOSIS — K52.9 ENTEROCOLITIS: Primary | ICD-10-CM

## 2022-08-24 DIAGNOSIS — R19.7 NAUSEA VOMITING AND DIARRHEA: ICD-10-CM

## 2022-08-24 LAB
ALBUMIN SERPL-MCNC: 4.3 G/DL (ref 3.5–5.2)
ALBUMIN/GLOB SERPL: 1.7 G/DL
ALP SERPL-CCNC: 62 U/L (ref 39–117)
ALT SERPL W P-5'-P-CCNC: 22 U/L (ref 1–33)
ANION GAP SERPL CALCULATED.3IONS-SCNC: 15 MMOL/L (ref 5–15)
AST SERPL-CCNC: 24 U/L (ref 1–32)
BASOPHILS # BLD AUTO: 0.05 10*3/MM3 (ref 0–0.2)
BASOPHILS NFR BLD AUTO: 0.6 % (ref 0–1.5)
BILIRUB SERPL-MCNC: 0.3 MG/DL (ref 0–1.2)
BUN SERPL-MCNC: 20 MG/DL (ref 6–20)
BUN/CREAT SERPL: 29 (ref 7–25)
CALCIUM SPEC-SCNC: 8.6 MG/DL (ref 8.6–10.5)
CHLORIDE SERPL-SCNC: 103 MMOL/L (ref 98–107)
CO2 SERPL-SCNC: 21 MMOL/L (ref 22–29)
CREAT SERPL-MCNC: 0.69 MG/DL (ref 0.57–1)
D-LACTATE SERPL-SCNC: 1.6 MMOL/L (ref 0.5–2)
DEPRECATED RDW RBC AUTO: 43.3 FL (ref 37–54)
EGFRCR SERPLBLD CKD-EPI 2021: 115.5 ML/MIN/1.73
EOSINOPHIL # BLD AUTO: 0.28 10*3/MM3 (ref 0–0.4)
EOSINOPHIL NFR BLD AUTO: 3.4 % (ref 0.3–6.2)
ERYTHROCYTE [DISTWIDTH] IN BLOOD BY AUTOMATED COUNT: 13.8 % (ref 12.3–15.4)
FLUAV SUBTYP SPEC NAA+PROBE: NOT DETECTED
FLUBV RNA ISLT QL NAA+PROBE: NOT DETECTED
GLOBULIN UR ELPH-MCNC: 2.6 GM/DL
GLUCOSE SERPL-MCNC: 94 MG/DL (ref 65–99)
HCT VFR BLD AUTO: 40.7 % (ref 34–46.6)
HGB BLD-MCNC: 13.5 G/DL (ref 12–15.9)
HOLD SPECIMEN: NORMAL
HOLD SPECIMEN: NORMAL
IMM GRANULOCYTES # BLD AUTO: 0.03 10*3/MM3 (ref 0–0.05)
IMM GRANULOCYTES NFR BLD AUTO: 0.4 % (ref 0–0.5)
LIPASE SERPL-CCNC: 30 U/L (ref 13–60)
LYMPHOCYTES # BLD AUTO: 2 10*3/MM3 (ref 0.7–3.1)
LYMPHOCYTES NFR BLD AUTO: 24 % (ref 19.6–45.3)
MCH RBC QN AUTO: 28.8 PG (ref 26.6–33)
MCHC RBC AUTO-ENTMCNC: 33.2 G/DL (ref 31.5–35.7)
MCV RBC AUTO: 86.8 FL (ref 79–97)
MONOCYTES # BLD AUTO: 0.58 10*3/MM3 (ref 0.1–0.9)
MONOCYTES NFR BLD AUTO: 7 % (ref 5–12)
NEUTROPHILS NFR BLD AUTO: 5.39 10*3/MM3 (ref 1.7–7)
NEUTROPHILS NFR BLD AUTO: 64.6 % (ref 42.7–76)
NRBC BLD AUTO-RTO: 0 /100 WBC (ref 0–0.2)
PLATELET # BLD AUTO: 340 10*3/MM3 (ref 140–450)
PMV BLD AUTO: 9.5 FL (ref 6–12)
POTASSIUM SERPL-SCNC: 3.3 MMOL/L (ref 3.5–5.2)
PROT SERPL-MCNC: 6.9 G/DL (ref 6–8.5)
RBC # BLD AUTO: 4.69 10*6/MM3 (ref 3.77–5.28)
SARS-COV-2 RNA PNL SPEC NAA+PROBE: NOT DETECTED
SODIUM SERPL-SCNC: 139 MMOL/L (ref 136–145)
WBC NRBC COR # BLD: 8.33 10*3/MM3 (ref 3.4–10.8)
WHOLE BLOOD HOLD COAG: NORMAL
WHOLE BLOOD HOLD SPECIMEN: NORMAL

## 2022-08-24 PROCEDURE — 71045 X-RAY EXAM CHEST 1 VIEW: CPT

## 2022-08-24 PROCEDURE — 83605 ASSAY OF LACTIC ACID: CPT | Performed by: EMERGENCY MEDICINE

## 2022-08-24 PROCEDURE — 83690 ASSAY OF LIPASE: CPT | Performed by: EMERGENCY MEDICINE

## 2022-08-24 PROCEDURE — 87636 SARSCOV2 & INF A&B AMP PRB: CPT | Performed by: EMERGENCY MEDICINE

## 2022-08-24 PROCEDURE — 80053 COMPREHEN METABOLIC PANEL: CPT | Performed by: EMERGENCY MEDICINE

## 2022-08-24 PROCEDURE — 25010000002 KETOROLAC TROMETHAMINE PER 15 MG: Performed by: EMERGENCY MEDICINE

## 2022-08-24 PROCEDURE — 96375 TX/PRO/DX INJ NEW DRUG ADDON: CPT

## 2022-08-24 PROCEDURE — 85025 COMPLETE CBC W/AUTO DIFF WBC: CPT | Performed by: EMERGENCY MEDICINE

## 2022-08-24 PROCEDURE — 96361 HYDRATE IV INFUSION ADD-ON: CPT

## 2022-08-24 PROCEDURE — 25010000002 IOPAMIDOL 61 % SOLUTION: Performed by: EMERGENCY MEDICINE

## 2022-08-24 PROCEDURE — 25010000002 ONDANSETRON PER 1 MG: Performed by: EMERGENCY MEDICINE

## 2022-08-24 PROCEDURE — 96374 THER/PROPH/DIAG INJ IV PUSH: CPT

## 2022-08-24 PROCEDURE — 74177 CT ABD & PELVIS W/CONTRAST: CPT | Performed by: EMERGENCY MEDICINE

## 2022-08-24 PROCEDURE — 99283 EMERGENCY DEPT VISIT LOW MDM: CPT

## 2022-08-24 PROCEDURE — 86580 TB INTRADERMAL TEST: CPT | Performed by: FAMILY MEDICINE

## 2022-08-24 RX ORDER — SODIUM CHLORIDE 9 MG/ML
150 INJECTION, SOLUTION INTRAVENOUS CONTINUOUS
Status: DISCONTINUED | OUTPATIENT
Start: 2022-08-24 | End: 2022-08-24 | Stop reason: HOSPADM

## 2022-08-24 RX ORDER — DICYCLOMINE HCL 20 MG
20 TABLET ORAL EVERY 6 HOURS PRN
Qty: 30 TABLET | Refills: 0 | Status: SHIPPED | OUTPATIENT
Start: 2022-08-24

## 2022-08-24 RX ORDER — SODIUM CHLORIDE 0.9 % (FLUSH) 0.9 %
10 SYRINGE (ML) INJECTION AS NEEDED
Status: DISCONTINUED | OUTPATIENT
Start: 2022-08-24 | End: 2022-08-24 | Stop reason: HOSPADM

## 2022-08-24 RX ORDER — SODIUM CHLORIDE 9 MG/ML
125 INJECTION, SOLUTION INTRAVENOUS CONTINUOUS
Status: DISCONTINUED | OUTPATIENT
Start: 2022-08-24 | End: 2022-08-24

## 2022-08-24 RX ORDER — KETOROLAC TROMETHAMINE 15 MG/ML
15 INJECTION, SOLUTION INTRAMUSCULAR; INTRAVENOUS ONCE
Status: COMPLETED | OUTPATIENT
Start: 2022-08-24 | End: 2022-08-24

## 2022-08-24 RX ORDER — METRONIDAZOLE 500 MG/1
500 TABLET ORAL 2 TIMES DAILY
Qty: 14 TABLET | Refills: 0 | Status: SHIPPED | OUTPATIENT
Start: 2022-08-24

## 2022-08-24 RX ORDER — METRONIDAZOLE 500 MG/1
500 TABLET ORAL ONCE
Status: COMPLETED | OUTPATIENT
Start: 2022-08-24 | End: 2022-08-24

## 2022-08-24 RX ORDER — ONDANSETRON 4 MG/1
4 TABLET, ORALLY DISINTEGRATING ORAL EVERY 6 HOURS PRN
Qty: 15 TABLET | Refills: 0 | Status: SHIPPED | OUTPATIENT
Start: 2022-08-24

## 2022-08-24 RX ORDER — ONDANSETRON 2 MG/ML
4 INJECTION INTRAMUSCULAR; INTRAVENOUS ONCE
Status: COMPLETED | OUTPATIENT
Start: 2022-08-24 | End: 2022-08-24

## 2022-08-24 RX ADMIN — SODIUM CHLORIDE 1000 ML: 9 INJECTION, SOLUTION INTRAVENOUS at 16:26

## 2022-08-24 RX ADMIN — METRONIDAZOLE 500 MG: 500 TABLET ORAL at 18:05

## 2022-08-24 RX ADMIN — SODIUM CHLORIDE 150 ML/HR: 9 INJECTION, SOLUTION INTRAVENOUS at 16:31

## 2022-08-24 RX ADMIN — ONDANSETRON 4 MG: 2 INJECTION INTRAMUSCULAR; INTRAVENOUS at 16:27

## 2022-08-24 RX ADMIN — IOPAMIDOL 90 ML: 612 INJECTION, SOLUTION INTRAVENOUS at 16:44

## 2022-08-24 RX ADMIN — KETOROLAC TROMETHAMINE 15 MG: 15 INJECTION, SOLUTION INTRAMUSCULAR; INTRAVENOUS at 16:27

## 2022-08-26 LAB
INDURATION: 0 MM (ref 0–10)
Lab: NORMAL
Lab: NORMAL
TB SKIN TEST: NEGATIVE

## 2022-08-30 ENCOUNTER — TELEMEDICINE (OUTPATIENT)
Dept: PSYCHIATRY | Facility: CLINIC | Age: 37
End: 2022-08-30

## 2022-08-30 DIAGNOSIS — F41.1 GENERALIZED ANXIETY DISORDER: ICD-10-CM

## 2022-08-30 DIAGNOSIS — F33.1 MAJOR DEPRESSIVE DISORDER, RECURRENT EPISODE, MODERATE: Primary | ICD-10-CM

## 2022-08-30 PROCEDURE — 90791 PSYCH DIAGNOSTIC EVALUATION: CPT | Performed by: COUNSELOR
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
## 2022-09-02 ENCOUNTER — OFFICE VISIT (OUTPATIENT)
Dept: FAMILY MEDICINE CLINIC | Facility: CLINIC | Age: 37
End: 2022-09-02

## 2022-09-02 VITALS
DIASTOLIC BLOOD PRESSURE: 80 MMHG | HEIGHT: 55 IN | SYSTOLIC BLOOD PRESSURE: 110 MMHG | BODY MASS INDEX: 37.03 KG/M2 | OXYGEN SATURATION: 98 % | WEIGHT: 160 LBS | HEART RATE: 86 BPM

## 2022-09-02 DIAGNOSIS — K31.84 GASTROPARESIS: Primary | ICD-10-CM

## 2022-09-02 PROCEDURE — 99213 OFFICE O/P EST LOW 20 MIN: CPT | Performed by: NURSE PRACTITIONER

## 2022-09-02 RX ORDER — SUCRALFATE ORAL 1 G/10ML
1 SUSPENSION ORAL 4 TIMES DAILY
Qty: 414 ML | Refills: 0 | Status: SHIPPED | OUTPATIENT
Start: 2022-09-02

## 2022-09-02 RX ORDER — HYOSCYAMINE SULFATE EXTENDED-RELEASE 0.38 MG/1
0.38 TABLET ORAL EVERY 12 HOURS PRN
Qty: 60 TABLET | Refills: 5 | Status: SHIPPED | OUTPATIENT
Start: 2022-09-02

## 2022-09-02 NOTE — PROGRESS NOTES
Chief Complaint   Patient presents with   • GI Problem     ER follow up from last week     Subjective   Ellie Saini is a 36 y.o. female.           Presents with follow up from er with abdominal pain, nausea -reports history of gastroparesis     Abdominal Pain  This is a recurrent problem. The current episode started 1 to 4 weeks ago. The onset quality is gradual. The problem occurs intermittently. The problem has been gradually worsening. The pain is located in the epigastric region and generalized abdominal region. The pain is at a severity of 5/10. The quality of the pain is dull. The abdominal pain radiates to the periumbilical region. Associated symptoms include nausea and vomiting. Pertinent negatives include no arthralgias, belching, constipation, dysuria, fever, frequency, headaches, hematochezia, hematuria, melena, myalgias or weight loss. The treatment provided mild relief. Prior diagnostic workup includes GI consult. Her past medical history is significant for GERD. There is no history of abdominal surgery, colon cancer, Crohn's disease, irritable bowel syndrome, pancreatitis or PUD.   Heartburn  She complains of abdominal pain, dysphagia, globus sensation, heartburn and nausea. She reports no belching, no chest pain, no choking, no coughing, no sore throat, no tooth decay, no water brash or no wheezing. This is a recurrent problem. The problem occurs frequently. The problem has been gradually worsening. The heartburn does not wake her from sleep. The heartburn does not limit her activity. The heartburn doesn't change with position. Associated symptoms include fatigue. Pertinent negatives include no melena, muscle weakness, orthopnea or weight loss. She has tried a PPI for the symptoms. The treatment provided no relief. Past procedures do not include an abdominal ultrasound, an EGD, esophageal manometry, esophageal pH monitoring, H. pylori antibody titer or a UGI. Past invasive treatments do not  include gastroplasty, gastroplication or reflux surgery.        The following portions of the patient's history were reviewed and updated as appropriate: allergies, current medications, past social history and problem list.    Review of Systems   Constitutional: Positive for fatigue. Negative for appetite change, chills, fever, unexpected weight change and weight loss.   HENT: Negative for congestion, dental problem, drooling, facial swelling, hearing loss, mouth sores, nosebleeds, sneezing and sore throat.    Eyes: Negative.  Negative for photophobia, redness, itching and visual disturbance.   Respiratory: Negative.  Negative for cough, choking and wheezing.    Cardiovascular: Negative.  Negative for chest pain, palpitations and leg swelling.   Gastrointestinal: Positive for abdominal distention, abdominal pain, dysphagia, heartburn, nausea and vomiting. Negative for anal bleeding, blood in stool, constipation, hematochezia, melena and rectal pain.        Generalized abdominal pain and gerd symptoms worsening    Endocrine: Negative.  Negative for polydipsia, polyphagia and polyuria.   Genitourinary: Negative for dysuria, frequency and hematuria.   Musculoskeletal: Negative.  Negative for arthralgias, back pain, gait problem, joint swelling, myalgias, muscle weakness, neck pain and neck stiffness.   Skin: Negative.    Allergic/Immunologic: Negative.  Negative for environmental allergies, food allergies and immunocompromised state.   Neurological: Negative.  Negative for dizziness, syncope, facial asymmetry, weakness, light-headedness, numbness and headaches.   Hematological: Negative.  Negative for adenopathy. Does not bruise/bleed easily.   Psychiatric/Behavioral: Negative.  Negative for agitation, behavioral problems, confusion, decreased concentration, dysphoric mood, hallucinations, self-injury, sleep disturbance and suicidal ideas. The patient is not nervous/anxious and is not hyperactive.        Objective   BP  "110/80   Pulse 86   Ht 134.6 cm (53\")   Wt 72.6 kg (160 lb)   SpO2 98%   BMI 40.05 kg/m²   Physical Exam  Vitals and nursing note reviewed.   Constitutional:       General: She is not in acute distress.     Appearance: Normal appearance. She is not ill-appearing, toxic-appearing or diaphoretic.   HENT:      Head: Normocephalic and atraumatic.      Comments: Fluid bilateral tm's      Right Ear: Tympanic membrane and ear canal normal. There is no impacted cerumen.      Left Ear: Tympanic membrane normal. There is no impacted cerumen.      Nose: Nose normal. No congestion or rhinorrhea.      Mouth/Throat:      Mouth: Mucous membranes are moist.      Pharynx: No oropharyngeal exudate or posterior oropharyngeal erythema.   Eyes:      General: No scleral icterus.        Right eye: No discharge.         Left eye: No discharge.      Pupils: Pupils are equal, round, and reactive to light.   Neck:      Vascular: No carotid bruit.   Cardiovascular:      Rate and Rhythm: Normal rate and regular rhythm.      Pulses: Normal pulses.      Heart sounds: Normal heart sounds. No murmur heard.    No friction rub. No gallop.   Pulmonary:      Effort: Pulmonary effort is normal. No respiratory distress.      Breath sounds: Normal breath sounds. No stridor. No wheezing, rhonchi or rales.   Chest:      Chest wall: No tenderness.   Abdominal:      General: Abdomen is flat. Bowel sounds are normal. There is no distension.      Palpations: Abdomen is soft. There is no shifting dullness, fluid wave, hepatomegaly, splenomegaly, mass or pulsatile mass.      Tenderness: There is abdominal tenderness in the right upper quadrant, right lower quadrant, epigastric area, periumbilical area, suprapubic area, left upper quadrant and left lower quadrant. There is no right CVA tenderness, left CVA tenderness, guarding or rebound. Negative signs include Lindo's sign, Rovsing's sign, McBurney's sign and psoas sign.      Hernia: No hernia is present. " There is no hernia in the umbilical area or ventral area.       Musculoskeletal:         General: No swelling, deformity or signs of injury. Normal range of motion.      Cervical back: Normal range of motion. No rigidity or tenderness.      Right lower leg: No edema.      Left lower leg: No edema.   Lymphadenopathy:      Cervical: No cervical adenopathy.   Skin:     General: Skin is warm and dry.      Coloration: Skin is not jaundiced or pale.      Findings: No bruising, erythema, lesion or rash.   Neurological:      General: No focal deficit present.      Mental Status: She is alert and oriented to person, place, and time. Mental status is at baseline.      Cranial Nerves: No cranial nerve deficit.      Sensory: No sensory deficit.      Motor: No weakness.      Coordination: Coordination normal.      Gait: Gait normal.      Deep Tendon Reflexes: Reflexes normal.      Comments: Dizziness reproducable with exam    Psychiatric:         Mood and Affect: Mood normal.         Behavior: Behavior normal.              Assessment & Plan     Problems Addressed this Visit    None     Visit Diagnoses     Gastroparesis    -  Primary    Relevant Medications    hyoscyamine (LEVBID) 0.375 MG 12 hr tablet    sucralfate (Carafate) 1 GM/10ML suspension    Other Relevant Orders    Ambulatory Referral to Gastroenterology      Diagnoses       Codes Comments    Gastroparesis    -  Primary ICD-10-CM: K31.84  ICD-9-CM: 536.3            New Medications Ordered This Visit   Medications   • hyoscyamine (LEVBID) 0.375 MG 12 hr tablet     Sig: Take 1 tablet by mouth Every 12 (Twelve) Hours As Needed for Cramping.     Dispense:  60 tablet     Refill:  5   • sucralfate (Carafate) 1 GM/10ML suspension     Sig: Take 10 mL by mouth 4 (Four) Times a Day.     Dispense:  414 mL     Refill:  0     Current Outpatient Medications on File Prior to Visit   Medication Sig Dispense Refill   • cetirizine (zyrTEC) 10 MG tablet TAKE 1 TABLET BY MOUTH EVERY DAY 30  tablet 11   • dicyclomine (BENTYL) 20 MG tablet Take 1 tablet by mouth Every 6 (Six) Hours As Needed (abdominal pain, cramping, diarrhea). 30 tablet 0   • docusate sodium (COLACE) 100 MG capsule TAKE 1 CAPSULE BY MOUTH TWICE A DAY 60 capsule 11   • esomeprazole (nexIUM) 40 MG capsule TAKE 1 CAPSULE BY MOUTH EVERY DAY 30 capsule 0   • estradiol (ESTRACE) 1 MG tablet TAKE 1 TABLET BY MOUTH EVERY DAY 30 tablet 11   • fluticasone (Flonase) 50 MCG/ACT nasal spray 2 sprays into the nostril(s) as directed by provider Daily. 16 g 11   • Fremanezumab-vfrm (Ajovy) 225 MG/1.5ML solution auto-injector Inject 1 application under the skin into the appropriate area as directed Every 30 (Thirty) Days. 4 pen 11   • Fremanezumab-vfrm 225 MG/1.5ML solution auto-injector Inject 225 mg under the skin into the appropriate area as directed Every 30 (Thirty) Days. 1.5 mL 11   • meclizine (ANTIVERT) 25 MG tablet Take 1 tablet by mouth 3 (Three) Times a Day As Needed for Dizziness (tid prn dizziness). 30 tablet 11   • meloxicam (MOBIC) 7.5 MG tablet TAKE 1 TABLET BY MOUTH EVERY DAY 30 tablet 11   • metoclopramide (REGLAN) 5 MG tablet TAKE 1 TABLET BY MOUTH TWICE A DAY 60 tablet 11   • metroNIDAZOLE (FLAGYL) 500 MG tablet Take 1 tablet by mouth 2 (Two) Times a Day. 14 tablet 0   • ondansetron ODT (ZOFRAN-ODT) 4 MG disintegrating tablet Place 1 tablet on the tongue Every 6 (Six) Hours As Needed for Nausea or Vomiting. 15 tablet 0   • PARoxetine (Paxil) 20 MG tablet Take 1 tablet by mouth Every Morning. 90 tablet 3   • rizatriptan (Maxalt) 10 MG tablet Take 1 tablet by mouth 1 (One) Time As Needed for Migraine. May repeat in 2 hours if needed 9 tablet 11   • TiZANidine (ZANAFLEX) 4 MG capsule Take 1 capsule by mouth 3 (Three) Times a Day As Needed for Muscle Spasms. 90 capsule 0   • topiramate (TOPAMAX) 100 MG tablet Take 1 tablet by mouth 2 (Two) Times a Day. 60 tablet 11     No current facility-administered medications on file prior to visit.        15 minutes   Follow Up   No follow-ups on file.      It's not just what you eat, but when you eat  Eat breakfast, and eat smaller meals throughout the day. A healthy breakfast can jumpstart your metabolism, while eating small, healthy meals (rather than the standard three large meals) keeps your energy up.   Avoid eating at night. Try to eat dinner earlier and fast for 14-16 hours until breakfast the next morning. Studies suggest that eating only when you’re most active and giving your digestive system a long break each day may help to regulate weight.     meds as directed, follow up with gastro as directed  ER chart reviewed -start on levbid and carafate as directed -ER if worsen

## 2022-09-23 ENCOUNTER — OFFICE VISIT (OUTPATIENT)
Dept: GASTROENTEROLOGY | Facility: CLINIC | Age: 37
End: 2022-09-23

## 2022-09-23 VITALS
DIASTOLIC BLOOD PRESSURE: 95 MMHG | HEIGHT: 55 IN | SYSTOLIC BLOOD PRESSURE: 139 MMHG | WEIGHT: 160 LBS | BODY MASS INDEX: 37.03 KG/M2 | HEART RATE: 80 BPM

## 2022-09-23 DIAGNOSIS — K59.09 OTHER CONSTIPATION: ICD-10-CM

## 2022-09-23 DIAGNOSIS — R10.84 GENERALIZED ABDOMINAL PAIN: Primary | ICD-10-CM

## 2022-09-23 DIAGNOSIS — R11.0 NAUSEA: ICD-10-CM

## 2022-09-23 PROCEDURE — 99214 OFFICE O/P EST MOD 30 MIN: CPT | Performed by: INTERNAL MEDICINE

## 2022-09-23 RX ORDER — ALBUTEROL SULFATE 90 UG/1
2 AEROSOL, METERED RESPIRATORY (INHALATION)
COMMUNITY

## 2022-09-23 RX ORDER — CYCLOBENZAPRINE HCL 5 MG
5 TABLET ORAL
COMMUNITY
End: 2022-10-03

## 2022-09-23 RX ORDER — DEXTROSE AND SODIUM CHLORIDE 5; .45 G/100ML; G/100ML
30 INJECTION, SOLUTION INTRAVENOUS CONTINUOUS PRN
Status: CANCELLED | OUTPATIENT
Start: 2022-10-11

## 2022-10-02 DIAGNOSIS — R53.81 OTHER MALAISE: ICD-10-CM

## 2022-10-02 DIAGNOSIS — Z86.69 PERSONAL HISTORY OF OTHER DISEASES OF THE NERVOUS SYSTEM AND SENSE ORGANS: ICD-10-CM

## 2022-10-03 RX ORDER — CYCLOBENZAPRINE HCL 5 MG
TABLET ORAL
Qty: 90 TABLET | Refills: 3 | Status: SHIPPED | OUTPATIENT
Start: 2022-10-03

## 2022-10-10 NOTE — H&P (VIEW-ONLY)
Chief Complaint   Patient presents with   • Gastroparesis       Subjective    Ellie Sissy Saini is a 36 y.o. female.    History of Present Illness  Patient presented to GI clinic for follow-up visit today.  Has generalized abdominal discomfort associated with nausea and vomiting for past 2 weeks.  Also has diarrhea alternating with constipation.  Denies rectal bleeding or weight loss.  Taking Nexium and Reglan daily.  Gained 9 pounds weight since past 1 and half years.  Pain is worse with food intake.  Last EGD and colonoscopy was in 2019.  Denied NSAID usage.     The following portions of the patient's history were reviewed and updated as appropriate:   Past Medical History:   Diagnosis Date   • Acute bronchitis    • Acute maxillary sinusitis    • Acute otitis media    • Acute pharyngitis    • Allergic rhinitis    • Breast lump    • Cramp in limb     Cramp in lower limb - bilateral      • Hormone replacement therapy (HRT)     H/O: hormone replacement (HRT)   • Insomnia    • Lymphadenopathy    • Malaise     Other malaise   • Migraine    • Postoperative visit    • Upper respiratory infection      Past Surgical History:   Procedure Laterality Date   •  SECTION      X 2   • COLONOSCOPY  2008   • COLONOSCOPY N/A 2019    Procedure: COLONOSCOPY;  Surgeon: Cong Devine DO;  Location: [facility] ENDOSCOPY;  Service: Gastroenterology   • DIAGNOSTIC LAPAROSCOPY  2014   • ENDOSCOPY N/A 2019    Procedure: ESOPHAGOGASTRODUODENOSCOPY;  Surgeon: Cong Devine DO;  Location: [facility] ENDOSCOPY;  Service: Gastroenterology   • LAPAROSCOPIC CHOLECYSTECTOMY  2007   • PAP SMEAR  2009   • PROCEDURE GENERIC CONVERTED  2013    REMOVE INTRAUTERINE DEVICE    • TOTAL ABDOMINAL HYSTERECTOMY WITH SALPINGO OOPHORECTOMY  2014   • UPPER GASTROINTESTINAL ENDOSCOPY  2019    Dr. Devine     Family History   Problem Relation Age of Onset   • Suicidality Father    • Diabetes Other    •  Stroke Other      OB History        2    Para   2    Term   2            AB        Living           SAB        IAB        Ectopic        Molar        Multiple        Live Births                  Prior to Admission medications    Medication Sig Start Date End Date Taking? Authorizing Provider   albuterol sulfate  (90 Base) MCG/ACT inhaler Inhale 2 puffs.   Yes Provider, MD Margareth   cetirizine (zyrTEC) 10 MG tablet TAKE 1 TABLET BY MOUTH EVERY DAY 22  Yes Linda Pennington APRN   dicyclomine (BENTYL) 20 MG tablet Take 1 tablet by mouth Every 6 (Six) Hours As Needed (abdominal pain, cramping, diarrhea). 22  Yes Tolu Stoddard MD   docusate sodium (COLACE) 100 MG capsule TAKE 1 CAPSULE BY MOUTH TWICE A DAY 22  Yes Linda Pennington APRN   esomeprazole (nexIUM) 40 MG capsule TAKE 1 CAPSULE BY MOUTH EVERY DAY 21  Yes Jorge L Snyder MD   estradiol (ESTRACE) 1 MG tablet TAKE 1 TABLET BY MOUTH EVERY DAY 22  Yes Linda Pennington APRN   fluticasone (Flonase) 50 MCG/ACT nasal spray 2 sprays into the nostril(s) as directed by provider Daily. 6/10/22  Yes Linda Pennington APRN   Fremanezumab-vfrm (Ajovy) 225 MG/1.5ML solution auto-injector Inject 1 application under the skin into the appropriate area as directed Every 30 (Thirty) Days. 21  Yes Linda Pennington APRN   Fremanezumab-vfrm 225 MG/1.5ML solution auto-injector Inject 225 mg under the skin into the appropriate area as directed Every 30 (Thirty) Days. 6/10/22  Yes Linda Pennington APRN   hyoscyamine (LEVBID) 0.375 MG 12 hr tablet Take 1 tablet by mouth Every 12 (Twelve) Hours As Needed for Cramping. 22  Yes Linda Pennington APRN   meclizine (ANTIVERT) 25 MG tablet Take 1 tablet by mouth 3 (Three) Times a Day As Needed for Dizziness (tid prn dizziness). 22  Yes Linda Pennington APRN   meloxicam (MOBIC) 7.5 MG tablet TAKE 1 TABLET BY MOUTH EVERY DAY 22   Yes Linda Pennington APRN   metoclopramide (REGLAN) 5 MG tablet TAKE 1 TABLET BY MOUTH TWICE A DAY 1/25/22  Yes Linda Pennington APRN   metroNIDAZOLE (FLAGYL) 500 MG tablet Take 1 tablet by mouth 2 (Two) Times a Day. 8/24/22  Yes Tolu Stoddard MD   ondansetron ODT (ZOFRAN-ODT) 4 MG disintegrating tablet Place 1 tablet on the tongue Every 6 (Six) Hours As Needed for Nausea or Vomiting. 8/24/22  Yes Tolu Stoddard MD   PARoxetine (Paxil) 20 MG tablet Take 1 tablet by mouth Every Morning. 6/10/22  Yes iLnda Pennington APRN   rizatriptan (Maxalt) 10 MG tablet Take 1 tablet by mouth 1 (One) Time As Needed for Migraine. May repeat in 2 hours if needed 6/10/22  Yes Linda Pennington APRN   sucralfate (Carafate) 1 GM/10ML suspension Take 10 mL by mouth 4 (Four) Times a Day. 9/2/22  Yes Linda Pennington APRN   TiZANidine (ZANAFLEX) 4 MG capsule Take 1 capsule by mouth 3 (Three) Times a Day As Needed for Muscle Spasms. 8/11/22  Yes Martha Johns APRN   topiramate (TOPAMAX) 100 MG tablet Take 1 tablet by mouth 2 (Two) Times a Day. 6/10/22  Yes Linda Pennington APRN   cyclobenzaprine (FLEXERIL) 5 MG tablet TAKE 1 - 2 TABLETS BY MOUTH UP TO 3 TIMES A DAY AS NEEDED FOR MUSCLE SPASMS 10/3/22   Linda Pennington APRN   linaclotide (LINZESS) 290 MCG capsule capsule Take 1 capsule by mouth Every Morning Before Breakfast. 9/23/22   Jorge L Snyder MD   polyethylene glycol (GoLYTELY) 236 g solution Please use the instructions given in office 9/23/22   Jorge L Snyder MD     Allergies   Allergen Reactions   • Hydrocodone GI Intolerance   • Other Other (See Comments)     Seasonal Allergies   • Shrimp Unknown - High Severity     Social History     Socioeconomic History   • Marital status: Single   • Number of children: 3   • Highest education level: Some college, no degree   Tobacco Use   • Smoking status: Never   • Smokeless tobacco: Never   Vaping Use   • Vaping Use: Never used  "  Substance and Sexual Activity   • Alcohol use: No   • Drug use: Never   • Sexual activity: Defer       Review of Systems  Review of Systems   Constitutional: Positive for unexpected weight change. Negative for chills, fatigue and fever.   HENT: Negative for congestion, ear discharge, hearing loss, nosebleeds and sore throat.    Eyes: Negative for pain, discharge and redness.   Respiratory: Negative for cough, chest tightness, shortness of breath and wheezing.    Cardiovascular: Negative for chest pain and palpitations.   Gastrointestinal: Positive for abdominal pain, constipation, diarrhea, nausea and vomiting. Negative for abdominal distention and blood in stool.   Endocrine: Negative for cold intolerance, polydipsia, polyphagia and polyuria.   Genitourinary: Negative for dysuria, flank pain, frequency, hematuria and urgency.   Musculoskeletal: Negative for arthralgias, back pain, joint swelling and myalgias.   Skin: Negative for color change, pallor and rash.   Neurological: Negative for tremors, seizures, syncope, weakness and headaches.   Hematological: Negative for adenopathy. Does not bruise/bleed easily.   Psychiatric/Behavioral: Negative for behavioral problems, confusion, dysphoric mood, hallucinations and suicidal ideas. The patient is not nervous/anxious.         /95 (BP Location: Left arm)   Pulse 80   Ht 134.6 cm (53\")   Wt 72.6 kg (160 lb)   BMI 40.05 kg/m²     Objective    Physical Exam  Constitutional:       Appearance: She is well-developed.   HENT:      Head: Normocephalic and atraumatic.   Eyes:      Conjunctiva/sclera: Conjunctivae normal.      Pupils: Pupils are equal, round, and reactive to light.   Neck:      Thyroid: No thyromegaly.   Cardiovascular:      Rate and Rhythm: Normal rate and regular rhythm.      Heart sounds: Normal heart sounds. No murmur heard.  Pulmonary:      Effort: Pulmonary effort is normal.      Breath sounds: Normal breath sounds. No wheezing.   Abdominal:    "   General: Bowel sounds are normal. There is no distension.      Palpations: Abdomen is soft. There is no mass.      Tenderness: There is no abdominal tenderness.      Hernia: No hernia is present.   Genitourinary:     Comments: No lesions noted  Musculoskeletal:         General: No tenderness. Normal range of motion.      Cervical back: Normal range of motion and neck supple.   Lymphadenopathy:      Cervical: No cervical adenopathy.   Skin:     General: Skin is warm and dry.      Findings: No rash.   Neurological:      Mental Status: She is alert and oriented to person, place, and time.      Cranial Nerves: No cranial nerve deficit.   Psychiatric:         Thought Content: Thought content normal.       Admission on 08/24/2022, Discharged on 08/24/2022   Component Date Value Ref Range Status   • Glucose 08/24/2022 94  65 - 99 mg/dL Final   • BUN 08/24/2022 20  6 - 20 mg/dL Final   • Creatinine 08/24/2022 0.69  0.57 - 1.00 mg/dL Final   • Sodium 08/24/2022 139  136 - 145 mmol/L Final   • Potassium 08/24/2022 3.3 (L)  3.5 - 5.2 mmol/L Final    Slight hemolysis detected by analyzer. Results may be affected.   • Chloride 08/24/2022 103  98 - 107 mmol/L Final   • CO2 08/24/2022 21.0 (L)  22.0 - 29.0 mmol/L Final   • Calcium 08/24/2022 8.6  8.6 - 10.5 mg/dL Final   • Total Protein 08/24/2022 6.9  6.0 - 8.5 g/dL Final   • Albumin 08/24/2022 4.30  3.50 - 5.20 g/dL Final   • ALT (SGPT) 08/24/2022 22  1 - 33 U/L Final   • AST (SGOT) 08/24/2022 24  1 - 32 U/L Final   • Alkaline Phosphatase 08/24/2022 62  39 - 117 U/L Final   • Total Bilirubin 08/24/2022 0.3  0.0 - 1.2 mg/dL Final   • Globulin 08/24/2022 2.6  gm/dL Final   • A/G Ratio 08/24/2022 1.7  g/dL Final   • BUN/Creatinine Ratio 08/24/2022 29.0 (H)  7.0 - 25.0 Final   • Anion Gap 08/24/2022 15.0  5.0 - 15.0 mmol/L Final   • eGFR 08/24/2022 115.5  >60.0 mL/min/1.73 Final    National Kidney Foundation and American Society of Nephrology (ASN) Task Force recommended  calculation based on the Chronic Kidney Disease Epidemiology Collaboration (CKD-EPI) equation refit without adjustment for race.   • Lipase 08/24/2022 30  13 - 60 U/L Final   • Extra Tube 08/24/2022 Hold for add-ons.   Final    Auto resulted.   • Extra Tube 08/24/2022 hold for add-on   Final    Auto resulted   • Extra Tube 08/24/2022 Hold for add-ons.   Final    Auto resulted.   • Extra Tube 08/24/2022 Hold for add-ons.   Final    Auto resulted   • WBC 08/24/2022 8.33  3.40 - 10.80 10*3/mm3 Final   • RBC 08/24/2022 4.69  3.77 - 5.28 10*6/mm3 Final   • Hemoglobin 08/24/2022 13.5  12.0 - 15.9 g/dL Final   • Hematocrit 08/24/2022 40.7  34.0 - 46.6 % Final   • MCV 08/24/2022 86.8  79.0 - 97.0 fL Final   • MCH 08/24/2022 28.8  26.6 - 33.0 pg Final   • MCHC 08/24/2022 33.2  31.5 - 35.7 g/dL Final   • RDW 08/24/2022 13.8  12.3 - 15.4 % Final   • RDW-SD 08/24/2022 43.3  37.0 - 54.0 fl Final   • MPV 08/24/2022 9.5  6.0 - 12.0 fL Final   • Platelets 08/24/2022 340  140 - 450 10*3/mm3 Final   • Neutrophil % 08/24/2022 64.6  42.7 - 76.0 % Final   • Lymphocyte % 08/24/2022 24.0  19.6 - 45.3 % Final   • Monocyte % 08/24/2022 7.0  5.0 - 12.0 % Final   • Eosinophil % 08/24/2022 3.4  0.3 - 6.2 % Final   • Basophil % 08/24/2022 0.6  0.0 - 1.5 % Final   • Immature Grans % 08/24/2022 0.4  0.0 - 0.5 % Final   • Neutrophils, Absolute 08/24/2022 5.39  1.70 - 7.00 10*3/mm3 Final   • Lymphocytes, Absolute 08/24/2022 2.00  0.70 - 3.10 10*3/mm3 Final   • Monocytes, Absolute 08/24/2022 0.58  0.10 - 0.90 10*3/mm3 Final   • Eosinophils, Absolute 08/24/2022 0.28  0.00 - 0.40 10*3/mm3 Final   • Basophils, Absolute 08/24/2022 0.05  0.00 - 0.20 10*3/mm3 Final   • Immature Grans, Absolute 08/24/2022 0.03  0.00 - 0.05 10*3/mm3 Final   • nRBC 08/24/2022 0.0  0.0 - 0.2 /100 WBC Final   • Lactate 08/24/2022 1.6  0.5 - 2.0 mmol/L Final   • COVID19 08/24/2022 Not Detected  Not Detected - Ref. Range Final   • Influenza A PCR 08/24/2022 Not Detected  Not  Detected Final   • Influenza B PCR 08/24/2022 Not Detected  Not Detected Final     Assessment & Plan      1. Generalized abdominal pain    2. Nausea    3. Other constipation    1.  Abdominal pain with nausea and vomiting likely due to gastroparesis.  Need to rule out peptic ulcer disease and gastritis.  Continue PPI and Reglan.  Proceed with EGD for further evaluation.  2.  Abdominal pain with diarrhea alternating with constipation, likely due to IBS.  Could also be due to dysmotility.  Continue high-fiber diet and MiraLAX. Add Linzess.  Add high-fiber diet.  Proceed with colonoscopy for further evaluation.  3.  Obesity with recent weight gain, recommend exercise and diet control.  4.  GERD, well controlled.  Continue PPI and antireflux lifestyle.  5.  History of pelvic floor dysfunction, refer to pelvic floor physical therapy.       Orders placed during this encounter include:  Orders Placed This Encounter   Procedures   • Follow Anesthesia Guidelines / Standing Orders     Standing Status:   Future   • Obtain Informed Consent     Standing Status:   Future     Order Specific Question:   Informed Consent Given For     Answer:   egd and colonoscopy       ESOPHAGOGASTRODUODENOSCOPY (N/A), COLONOSCOPY (N/A)    Review and/or summary of lab tests, radiology, procedures, medications. Review and summary of old records and obtaining of history. The risks and benefits of my recommendations, as well as other treatment options were discussed with the patient today. Questions were answered.    New Medications Ordered This Visit   Medications   • linaclotide (LINZESS) 290 MCG capsule capsule     Sig: Take 1 capsule by mouth Every Morning Before Breakfast.     Dispense:  30 capsule     Refill:  5   • polyethylene glycol (GoLYTELY) 236 g solution     Sig: Please use the instructions given in office     Dispense:  4000 mL     Refill:  0       Follow-up: Return in about 1 month (around 10/23/2022).               Results for orders  placed or performed during the hospital encounter of 08/24/22   Gold Top - SST   Result Value Ref Range    Extra Tube Hold for add-ons.    Green Top (Gel)   Result Value Ref Range    Extra Tube Hold for add-ons.    COVID-19 and FLU A/B PCR - Swab, Nasopharynx    Specimen: Nasopharynx; Swab   Result Value Ref Range    COVID19 Not Detected Not Detected - Ref. Range    Influenza A PCR Not Detected Not Detected    Influenza B PCR Not Detected Not Detected   CBC Auto Differential    Specimen: Blood   Result Value Ref Range    WBC 8.33 3.40 - 10.80 10*3/mm3    RBC 4.69 3.77 - 5.28 10*6/mm3    Hemoglobin 13.5 12.0 - 15.9 g/dL    Hematocrit 40.7 34.0 - 46.6 %    MCV 86.8 79.0 - 97.0 fL    MCH 28.8 26.6 - 33.0 pg    MCHC 33.2 31.5 - 35.7 g/dL    RDW 13.8 12.3 - 15.4 %    RDW-SD 43.3 37.0 - 54.0 fl    MPV 9.5 6.0 - 12.0 fL    Platelets 340 140 - 450 10*3/mm3    Neutrophil % 64.6 42.7 - 76.0 %    Lymphocyte % 24.0 19.6 - 45.3 %    Monocyte % 7.0 5.0 - 12.0 %    Eosinophil % 3.4 0.3 - 6.2 %    Basophil % 0.6 0.0 - 1.5 %    Immature Grans % 0.4 0.0 - 0.5 %    Neutrophils, Absolute 5.39 1.70 - 7.00 10*3/mm3    Lymphocytes, Absolute 2.00 0.70 - 3.10 10*3/mm3    Monocytes, Absolute 0.58 0.10 - 0.90 10*3/mm3    Eosinophils, Absolute 0.28 0.00 - 0.40 10*3/mm3    Basophils, Absolute 0.05 0.00 - 0.20 10*3/mm3    Immature Grans, Absolute 0.03 0.00 - 0.05 10*3/mm3    nRBC 0.0 0.0 - 0.2 /100 WBC   Lavender Top   Result Value Ref Range    Extra Tube hold for add-on    Light Blue Top   Result Value Ref Range    Extra Tube Hold for add-ons.    Lipase    Specimen: Blood   Result Value Ref Range    Lipase 30 13 - 60 U/L   Lactic Acid, Plasma    Specimen: Blood   Result Value Ref Range    Lactate 1.6 0.5 - 2.0 mmol/L   Comprehensive Metabolic Panel    Specimen: Blood   Result Value Ref Range    Glucose 94 65 - 99 mg/dL    BUN 20 6 - 20 mg/dL    Creatinine 0.69 0.57 - 1.00 mg/dL    Sodium 139 136 - 145 mmol/L    Potassium 3.3 (L) 3.5 - 5.2  mmol/L    Chloride 103 98 - 107 mmol/L    CO2 21.0 (L) 22.0 - 29.0 mmol/L    Calcium 8.6 8.6 - 10.5 mg/dL    Total Protein 6.9 6.0 - 8.5 g/dL    Albumin 4.30 3.50 - 5.20 g/dL    ALT (SGPT) 22 1 - 33 U/L    AST (SGOT) 24 1 - 32 U/L    Alkaline Phosphatase 62 39 - 117 U/L    Total Bilirubin 0.3 0.0 - 1.2 mg/dL    Globulin 2.6 gm/dL    A/G Ratio 1.7 g/dL    BUN/Creatinine Ratio 29.0 (H) 7.0 - 25.0    Anion Gap 15.0 5.0 - 15.0 mmol/L    eGFR 115.5 >60.0 mL/min/1.73   Results for orders placed or performed in visit on 08/24/22   TB Skin Test    Specimen: Blood   Result Value Ref Range    TB Skin Test Negative     Induration 0 0 - 10 mm    Injection Date & Time 08/24/2022     Read Date & Time 08/26/2022    Results for orders placed or performed in visit on 07/26/21   TB Skin Test    Specimen: Blood   Result Value Ref Range    TB Skin Test Negative     Induration 0 0 - 10 mm    Injection Date & Time 07/26/2021     Read Date & Time 02/28/2021    Results for orders placed or performed in visit on 02/04/21   CBC Auto Differential    Specimen: Blood   Result Value Ref Range    WBC 6.63 3.40 - 10.80 10*3/mm3    RBC 4.76 3.77 - 5.28 10*6/mm3    Hemoglobin 13.9 12.0 - 15.9 g/dL    Hematocrit 40.8 34.0 - 46.6 %    MCV 85.7 79.0 - 97.0 fL    MCH 29.2 26.6 - 33.0 pg    MCHC 34.1 31.5 - 35.7 g/dL    RDW 12.8 12.3 - 15.4 %    RDW-SD 38.8 37.0 - 54.0 fl    MPV 10.6 6.0 - 12.0 fL    Platelets 327 140 - 450 10*3/mm3    Neutrophil % 58.3 42.7 - 76.0 %    Lymphocyte % 30.8 19.6 - 45.3 %    Monocyte % 6.0 5.0 - 12.0 %    Eosinophil % 3.6 0.3 - 6.2 %    Basophil % 1.1 0.0 - 1.5 %    Immature Grans % 0.2 0.0 - 0.5 %    Neutrophils, Absolute 3.87 1.70 - 7.00 10*3/mm3    Lymphocytes, Absolute 2.04 0.70 - 3.10 10*3/mm3    Monocytes, Absolute 0.40 0.10 - 0.90 10*3/mm3    Eosinophils, Absolute 0.24 0.00 - 0.40 10*3/mm3    Basophils, Absolute 0.07 0.00 - 0.20 10*3/mm3    Immature Grans, Absolute 0.01 0.00 - 0.05 10*3/mm3    nRBC 0.2 0.0 - 0.2 /100  WBC   Hepatitis C Antibody    Specimen: Blood   Result Value Ref Range    Hepatitis C Ab Non-Reactive Non-Reactive   Vitamin D 25 Hydroxy    Specimen: Blood   Result Value Ref Range    25 Hydroxy, Vitamin D 40.4 30.0 - 100.0 ng/ml   TSH    Specimen: Blood   Result Value Ref Range    TSH 1.770 0.270 - 4.200 uIU/mL   Magnesium    Specimen: Blood   Result Value Ref Range    Magnesium 2.2 1.6 - 2.6 mg/dL   Iron    Specimen: Blood   Result Value Ref Range    Iron 100 37 - 145 mcg/dL   Vitamin B12    Specimen: Blood   Result Value Ref Range    Vitamin B-12 609 211 - 946 pg/mL   Lipid Panel    Specimen: Blood   Result Value Ref Range    Total Cholesterol 231 (H) 0 - 200 mg/dL    Triglycerides 500 (H) 0 - 150 mg/dL    HDL Cholesterol 41 40 - 60 mg/dL    LDL Cholesterol  105 (H) 0 - 100 mg/dL    VLDL Cholesterol 85 (H) 5 - 40 mg/dL    LDL/HDL Ratio 2.20    Comprehensive Metabolic Panel    Specimen: Blood   Result Value Ref Range    Glucose 78 65 - 99 mg/dL    BUN 16 6 - 20 mg/dL    Creatinine 0.69 0.57 - 1.00 mg/dL    Sodium 137 136 - 145 mmol/L    Potassium 4.2 3.5 - 5.2 mmol/L    Chloride 106 98 - 107 mmol/L    CO2 19.9 (L) 22.0 - 29.0 mmol/L    Calcium 9.5 8.6 - 10.5 mg/dL    Total Protein 7.0 6.0 - 8.5 g/dL    Albumin 4.10 3.50 - 5.20 g/dL    ALT (SGPT) 25 1 - 33 U/L    AST (SGOT) 21 1 - 32 U/L    Alkaline Phosphatase 43 39 - 117 U/L    Total Bilirubin 0.2 0.0 - 1.2 mg/dL    eGFR Non African Amer 97 >60 mL/min/1.73    Globulin 2.9 gm/dL    A/G Ratio 1.4 g/dL    BUN/Creatinine Ratio 23.2 7.0 - 25.0    Anion Gap 11.1 5.0 - 15.0 mmol/L     *Note: Due to a large number of results and/or encounters for the requested time period, some results have not been displayed. A complete set of results can be found in Results Review.         This document has been electronically signed by Jorge L Snyder MD on October 10, 2022 07:38 CDT

## 2022-10-10 NOTE — PROGRESS NOTES
Chief Complaint   Patient presents with   • Gastroparesis       Subjective    Ellie Sissy Saini is a 36 y.o. female.    History of Present Illness  Patient presented to GI clinic for follow-up visit today.  Has generalized abdominal discomfort associated with nausea and vomiting for past 2 weeks.  Also has diarrhea alternating with constipation.  Denies rectal bleeding or weight loss.  Taking Nexium and Reglan daily.  Gained 9 pounds weight since past 1 and half years.  Pain is worse with food intake.  Last EGD and colonoscopy was in 2019.  Denied NSAID usage.     The following portions of the patient's history were reviewed and updated as appropriate:   Past Medical History:   Diagnosis Date   • Acute bronchitis    • Acute maxillary sinusitis    • Acute otitis media    • Acute pharyngitis    • Allergic rhinitis    • Breast lump    • Cramp in limb     Cramp in lower limb - bilateral      • Hormone replacement therapy (HRT)     H/O: hormone replacement (HRT)   • Insomnia    • Lymphadenopathy    • Malaise     Other malaise   • Migraine    • Postoperative visit    • Upper respiratory infection      Past Surgical History:   Procedure Laterality Date   •  SECTION      X 2   • COLONOSCOPY  2008   • COLONOSCOPY N/A 2019    Procedure: COLONOSCOPY;  Surgeon: Cong Devine DO;  Location: [facility] ENDOSCOPY;  Service: Gastroenterology   • DIAGNOSTIC LAPAROSCOPY  2014   • ENDOSCOPY N/A 2019    Procedure: ESOPHAGOGASTRODUODENOSCOPY;  Surgeon: Cong Devine DO;  Location: [facility] ENDOSCOPY;  Service: Gastroenterology   • LAPAROSCOPIC CHOLECYSTECTOMY  2007   • PAP SMEAR  2009   • PROCEDURE GENERIC CONVERTED  2013    REMOVE INTRAUTERINE DEVICE    • TOTAL ABDOMINAL HYSTERECTOMY WITH SALPINGO OOPHORECTOMY  2014   • UPPER GASTROINTESTINAL ENDOSCOPY  2019    Dr. Devine     Family History   Problem Relation Age of Onset   • Suicidality Father    • Diabetes Other    •  Stroke Other      OB History        2    Para   2    Term   2            AB        Living           SAB        IAB        Ectopic        Molar        Multiple        Live Births                  Prior to Admission medications    Medication Sig Start Date End Date Taking? Authorizing Provider   albuterol sulfate  (90 Base) MCG/ACT inhaler Inhale 2 puffs.   Yes Provider, MD Margareth   cetirizine (zyrTEC) 10 MG tablet TAKE 1 TABLET BY MOUTH EVERY DAY 22  Yes Linda Pennington APRN   dicyclomine (BENTYL) 20 MG tablet Take 1 tablet by mouth Every 6 (Six) Hours As Needed (abdominal pain, cramping, diarrhea). 22  Yes Tolu Stoddard MD   docusate sodium (COLACE) 100 MG capsule TAKE 1 CAPSULE BY MOUTH TWICE A DAY 22  Yes Linda Pennington APRN   esomeprazole (nexIUM) 40 MG capsule TAKE 1 CAPSULE BY MOUTH EVERY DAY 21  Yes Jorge L Snyder MD   estradiol (ESTRACE) 1 MG tablet TAKE 1 TABLET BY MOUTH EVERY DAY 22  Yes Linda Pennington APRN   fluticasone (Flonase) 50 MCG/ACT nasal spray 2 sprays into the nostril(s) as directed by provider Daily. 6/10/22  Yes Linda Pennington APRN   Fremanezumab-vfrm (Ajovy) 225 MG/1.5ML solution auto-injector Inject 1 application under the skin into the appropriate area as directed Every 30 (Thirty) Days. 21  Yes Linda Pennington APRN   Fremanezumab-vfrm 225 MG/1.5ML solution auto-injector Inject 225 mg under the skin into the appropriate area as directed Every 30 (Thirty) Days. 6/10/22  Yes Linda Pennington APRN   hyoscyamine (LEVBID) 0.375 MG 12 hr tablet Take 1 tablet by mouth Every 12 (Twelve) Hours As Needed for Cramping. 22  Yes Linda Pennington APRN   meclizine (ANTIVERT) 25 MG tablet Take 1 tablet by mouth 3 (Three) Times a Day As Needed for Dizziness (tid prn dizziness). 22  Yes Linda Pennington APRN   meloxicam (MOBIC) 7.5 MG tablet TAKE 1 TABLET BY MOUTH EVERY DAY 22   Yes Linda Pennington APRN   metoclopramide (REGLAN) 5 MG tablet TAKE 1 TABLET BY MOUTH TWICE A DAY 1/25/22  Yes Linda Pennington APRN   metroNIDAZOLE (FLAGYL) 500 MG tablet Take 1 tablet by mouth 2 (Two) Times a Day. 8/24/22  Yes Tolu Stoddard MD   ondansetron ODT (ZOFRAN-ODT) 4 MG disintegrating tablet Place 1 tablet on the tongue Every 6 (Six) Hours As Needed for Nausea or Vomiting. 8/24/22  Yes Tolu Stoddard MD   PARoxetine (Paxil) 20 MG tablet Take 1 tablet by mouth Every Morning. 6/10/22  Yes Linda Pennington APRN   rizatriptan (Maxalt) 10 MG tablet Take 1 tablet by mouth 1 (One) Time As Needed for Migraine. May repeat in 2 hours if needed 6/10/22  Yes Linda Pennington APRN   sucralfate (Carafate) 1 GM/10ML suspension Take 10 mL by mouth 4 (Four) Times a Day. 9/2/22  Yes Linda Pennington APRN   TiZANidine (ZANAFLEX) 4 MG capsule Take 1 capsule by mouth 3 (Three) Times a Day As Needed for Muscle Spasms. 8/11/22  Yes Martha Johns APRN   topiramate (TOPAMAX) 100 MG tablet Take 1 tablet by mouth 2 (Two) Times a Day. 6/10/22  Yes Linda Pennington APRN   cyclobenzaprine (FLEXERIL) 5 MG tablet TAKE 1 - 2 TABLETS BY MOUTH UP TO 3 TIMES A DAY AS NEEDED FOR MUSCLE SPASMS 10/3/22   Linda Pennington APRN   linaclotide (LINZESS) 290 MCG capsule capsule Take 1 capsule by mouth Every Morning Before Breakfast. 9/23/22   Jorge L Snyder MD   polyethylene glycol (GoLYTELY) 236 g solution Please use the instructions given in office 9/23/22   Jorge L Snyder MD     Allergies   Allergen Reactions   • Hydrocodone GI Intolerance   • Other Other (See Comments)     Seasonal Allergies   • Shrimp Unknown - High Severity     Social History     Socioeconomic History   • Marital status: Single   • Number of children: 3   • Highest education level: Some college, no degree   Tobacco Use   • Smoking status: Never   • Smokeless tobacco: Never   Vaping Use   • Vaping Use: Never used  "  Substance and Sexual Activity   • Alcohol use: No   • Drug use: Never   • Sexual activity: Defer       Review of Systems  Review of Systems   Constitutional: Positive for unexpected weight change. Negative for chills, fatigue and fever.   HENT: Negative for congestion, ear discharge, hearing loss, nosebleeds and sore throat.    Eyes: Negative for pain, discharge and redness.   Respiratory: Negative for cough, chest tightness, shortness of breath and wheezing.    Cardiovascular: Negative for chest pain and palpitations.   Gastrointestinal: Positive for abdominal pain, constipation, diarrhea, nausea and vomiting. Negative for abdominal distention and blood in stool.   Endocrine: Negative for cold intolerance, polydipsia, polyphagia and polyuria.   Genitourinary: Negative for dysuria, flank pain, frequency, hematuria and urgency.   Musculoskeletal: Negative for arthralgias, back pain, joint swelling and myalgias.   Skin: Negative for color change, pallor and rash.   Neurological: Negative for tremors, seizures, syncope, weakness and headaches.   Hematological: Negative for adenopathy. Does not bruise/bleed easily.   Psychiatric/Behavioral: Negative for behavioral problems, confusion, dysphoric mood, hallucinations and suicidal ideas. The patient is not nervous/anxious.         /95 (BP Location: Left arm)   Pulse 80   Ht 134.6 cm (53\")   Wt 72.6 kg (160 lb)   BMI 40.05 kg/m²     Objective    Physical Exam  Constitutional:       Appearance: She is well-developed.   HENT:      Head: Normocephalic and atraumatic.   Eyes:      Conjunctiva/sclera: Conjunctivae normal.      Pupils: Pupils are equal, round, and reactive to light.   Neck:      Thyroid: No thyromegaly.   Cardiovascular:      Rate and Rhythm: Normal rate and regular rhythm.      Heart sounds: Normal heart sounds. No murmur heard.  Pulmonary:      Effort: Pulmonary effort is normal.      Breath sounds: Normal breath sounds. No wheezing.   Abdominal:    "   General: Bowel sounds are normal. There is no distension.      Palpations: Abdomen is soft. There is no mass.      Tenderness: There is no abdominal tenderness.      Hernia: No hernia is present.   Genitourinary:     Comments: No lesions noted  Musculoskeletal:         General: No tenderness. Normal range of motion.      Cervical back: Normal range of motion and neck supple.   Lymphadenopathy:      Cervical: No cervical adenopathy.   Skin:     General: Skin is warm and dry.      Findings: No rash.   Neurological:      Mental Status: She is alert and oriented to person, place, and time.      Cranial Nerves: No cranial nerve deficit.   Psychiatric:         Thought Content: Thought content normal.       Admission on 08/24/2022, Discharged on 08/24/2022   Component Date Value Ref Range Status   • Glucose 08/24/2022 94  65 - 99 mg/dL Final   • BUN 08/24/2022 20  6 - 20 mg/dL Final   • Creatinine 08/24/2022 0.69  0.57 - 1.00 mg/dL Final   • Sodium 08/24/2022 139  136 - 145 mmol/L Final   • Potassium 08/24/2022 3.3 (L)  3.5 - 5.2 mmol/L Final    Slight hemolysis detected by analyzer. Results may be affected.   • Chloride 08/24/2022 103  98 - 107 mmol/L Final   • CO2 08/24/2022 21.0 (L)  22.0 - 29.0 mmol/L Final   • Calcium 08/24/2022 8.6  8.6 - 10.5 mg/dL Final   • Total Protein 08/24/2022 6.9  6.0 - 8.5 g/dL Final   • Albumin 08/24/2022 4.30  3.50 - 5.20 g/dL Final   • ALT (SGPT) 08/24/2022 22  1 - 33 U/L Final   • AST (SGOT) 08/24/2022 24  1 - 32 U/L Final   • Alkaline Phosphatase 08/24/2022 62  39 - 117 U/L Final   • Total Bilirubin 08/24/2022 0.3  0.0 - 1.2 mg/dL Final   • Globulin 08/24/2022 2.6  gm/dL Final   • A/G Ratio 08/24/2022 1.7  g/dL Final   • BUN/Creatinine Ratio 08/24/2022 29.0 (H)  7.0 - 25.0 Final   • Anion Gap 08/24/2022 15.0  5.0 - 15.0 mmol/L Final   • eGFR 08/24/2022 115.5  >60.0 mL/min/1.73 Final    National Kidney Foundation and American Society of Nephrology (ASN) Task Force recommended  calculation based on the Chronic Kidney Disease Epidemiology Collaboration (CKD-EPI) equation refit without adjustment for race.   • Lipase 08/24/2022 30  13 - 60 U/L Final   • Extra Tube 08/24/2022 Hold for add-ons.   Final    Auto resulted.   • Extra Tube 08/24/2022 hold for add-on   Final    Auto resulted   • Extra Tube 08/24/2022 Hold for add-ons.   Final    Auto resulted.   • Extra Tube 08/24/2022 Hold for add-ons.   Final    Auto resulted   • WBC 08/24/2022 8.33  3.40 - 10.80 10*3/mm3 Final   • RBC 08/24/2022 4.69  3.77 - 5.28 10*6/mm3 Final   • Hemoglobin 08/24/2022 13.5  12.0 - 15.9 g/dL Final   • Hematocrit 08/24/2022 40.7  34.0 - 46.6 % Final   • MCV 08/24/2022 86.8  79.0 - 97.0 fL Final   • MCH 08/24/2022 28.8  26.6 - 33.0 pg Final   • MCHC 08/24/2022 33.2  31.5 - 35.7 g/dL Final   • RDW 08/24/2022 13.8  12.3 - 15.4 % Final   • RDW-SD 08/24/2022 43.3  37.0 - 54.0 fl Final   • MPV 08/24/2022 9.5  6.0 - 12.0 fL Final   • Platelets 08/24/2022 340  140 - 450 10*3/mm3 Final   • Neutrophil % 08/24/2022 64.6  42.7 - 76.0 % Final   • Lymphocyte % 08/24/2022 24.0  19.6 - 45.3 % Final   • Monocyte % 08/24/2022 7.0  5.0 - 12.0 % Final   • Eosinophil % 08/24/2022 3.4  0.3 - 6.2 % Final   • Basophil % 08/24/2022 0.6  0.0 - 1.5 % Final   • Immature Grans % 08/24/2022 0.4  0.0 - 0.5 % Final   • Neutrophils, Absolute 08/24/2022 5.39  1.70 - 7.00 10*3/mm3 Final   • Lymphocytes, Absolute 08/24/2022 2.00  0.70 - 3.10 10*3/mm3 Final   • Monocytes, Absolute 08/24/2022 0.58  0.10 - 0.90 10*3/mm3 Final   • Eosinophils, Absolute 08/24/2022 0.28  0.00 - 0.40 10*3/mm3 Final   • Basophils, Absolute 08/24/2022 0.05  0.00 - 0.20 10*3/mm3 Final   • Immature Grans, Absolute 08/24/2022 0.03  0.00 - 0.05 10*3/mm3 Final   • nRBC 08/24/2022 0.0  0.0 - 0.2 /100 WBC Final   • Lactate 08/24/2022 1.6  0.5 - 2.0 mmol/L Final   • COVID19 08/24/2022 Not Detected  Not Detected - Ref. Range Final   • Influenza A PCR 08/24/2022 Not Detected  Not  Detected Final   • Influenza B PCR 08/24/2022 Not Detected  Not Detected Final     Assessment & Plan      1. Generalized abdominal pain    2. Nausea    3. Other constipation    1.  Abdominal pain with nausea and vomiting likely due to gastroparesis.  Need to rule out peptic ulcer disease and gastritis.  Continue PPI and Reglan.  Proceed with EGD for further evaluation.  2.  Abdominal pain with diarrhea alternating with constipation, likely due to IBS.  Could also be due to dysmotility.  Continue high-fiber diet and MiraLAX. Add Linzess.  Add high-fiber diet.  Proceed with colonoscopy for further evaluation.  3.  Obesity with recent weight gain, recommend exercise and diet control.  4.  GERD, well controlled.  Continue PPI and antireflux lifestyle.  5.  History of pelvic floor dysfunction, refer to pelvic floor physical therapy.       Orders placed during this encounter include:  Orders Placed This Encounter   Procedures   • Follow Anesthesia Guidelines / Standing Orders     Standing Status:   Future   • Obtain Informed Consent     Standing Status:   Future     Order Specific Question:   Informed Consent Given For     Answer:   egd and colonoscopy       ESOPHAGOGASTRODUODENOSCOPY (N/A), COLONOSCOPY (N/A)    Review and/or summary of lab tests, radiology, procedures, medications. Review and summary of old records and obtaining of history. The risks and benefits of my recommendations, as well as other treatment options were discussed with the patient today. Questions were answered.    New Medications Ordered This Visit   Medications   • linaclotide (LINZESS) 290 MCG capsule capsule     Sig: Take 1 capsule by mouth Every Morning Before Breakfast.     Dispense:  30 capsule     Refill:  5   • polyethylene glycol (GoLYTELY) 236 g solution     Sig: Please use the instructions given in office     Dispense:  4000 mL     Refill:  0       Follow-up: Return in about 1 month (around 10/23/2022).               Results for orders  placed or performed during the hospital encounter of 08/24/22   Gold Top - SST   Result Value Ref Range    Extra Tube Hold for add-ons.    Green Top (Gel)   Result Value Ref Range    Extra Tube Hold for add-ons.    COVID-19 and FLU A/B PCR - Swab, Nasopharynx    Specimen: Nasopharynx; Swab   Result Value Ref Range    COVID19 Not Detected Not Detected - Ref. Range    Influenza A PCR Not Detected Not Detected    Influenza B PCR Not Detected Not Detected   CBC Auto Differential    Specimen: Blood   Result Value Ref Range    WBC 8.33 3.40 - 10.80 10*3/mm3    RBC 4.69 3.77 - 5.28 10*6/mm3    Hemoglobin 13.5 12.0 - 15.9 g/dL    Hematocrit 40.7 34.0 - 46.6 %    MCV 86.8 79.0 - 97.0 fL    MCH 28.8 26.6 - 33.0 pg    MCHC 33.2 31.5 - 35.7 g/dL    RDW 13.8 12.3 - 15.4 %    RDW-SD 43.3 37.0 - 54.0 fl    MPV 9.5 6.0 - 12.0 fL    Platelets 340 140 - 450 10*3/mm3    Neutrophil % 64.6 42.7 - 76.0 %    Lymphocyte % 24.0 19.6 - 45.3 %    Monocyte % 7.0 5.0 - 12.0 %    Eosinophil % 3.4 0.3 - 6.2 %    Basophil % 0.6 0.0 - 1.5 %    Immature Grans % 0.4 0.0 - 0.5 %    Neutrophils, Absolute 5.39 1.70 - 7.00 10*3/mm3    Lymphocytes, Absolute 2.00 0.70 - 3.10 10*3/mm3    Monocytes, Absolute 0.58 0.10 - 0.90 10*3/mm3    Eosinophils, Absolute 0.28 0.00 - 0.40 10*3/mm3    Basophils, Absolute 0.05 0.00 - 0.20 10*3/mm3    Immature Grans, Absolute 0.03 0.00 - 0.05 10*3/mm3    nRBC 0.0 0.0 - 0.2 /100 WBC   Lavender Top   Result Value Ref Range    Extra Tube hold for add-on    Light Blue Top   Result Value Ref Range    Extra Tube Hold for add-ons.    Lipase    Specimen: Blood   Result Value Ref Range    Lipase 30 13 - 60 U/L   Lactic Acid, Plasma    Specimen: Blood   Result Value Ref Range    Lactate 1.6 0.5 - 2.0 mmol/L   Comprehensive Metabolic Panel    Specimen: Blood   Result Value Ref Range    Glucose 94 65 - 99 mg/dL    BUN 20 6 - 20 mg/dL    Creatinine 0.69 0.57 - 1.00 mg/dL    Sodium 139 136 - 145 mmol/L    Potassium 3.3 (L) 3.5 - 5.2  mmol/L    Chloride 103 98 - 107 mmol/L    CO2 21.0 (L) 22.0 - 29.0 mmol/L    Calcium 8.6 8.6 - 10.5 mg/dL    Total Protein 6.9 6.0 - 8.5 g/dL    Albumin 4.30 3.50 - 5.20 g/dL    ALT (SGPT) 22 1 - 33 U/L    AST (SGOT) 24 1 - 32 U/L    Alkaline Phosphatase 62 39 - 117 U/L    Total Bilirubin 0.3 0.0 - 1.2 mg/dL    Globulin 2.6 gm/dL    A/G Ratio 1.7 g/dL    BUN/Creatinine Ratio 29.0 (H) 7.0 - 25.0    Anion Gap 15.0 5.0 - 15.0 mmol/L    eGFR 115.5 >60.0 mL/min/1.73   Results for orders placed or performed in visit on 08/24/22   TB Skin Test    Specimen: Blood   Result Value Ref Range    TB Skin Test Negative     Induration 0 0 - 10 mm    Injection Date & Time 08/24/2022     Read Date & Time 08/26/2022    Results for orders placed or performed in visit on 07/26/21   TB Skin Test    Specimen: Blood   Result Value Ref Range    TB Skin Test Negative     Induration 0 0 - 10 mm    Injection Date & Time 07/26/2021     Read Date & Time 02/28/2021    Results for orders placed or performed in visit on 02/04/21   CBC Auto Differential    Specimen: Blood   Result Value Ref Range    WBC 6.63 3.40 - 10.80 10*3/mm3    RBC 4.76 3.77 - 5.28 10*6/mm3    Hemoglobin 13.9 12.0 - 15.9 g/dL    Hematocrit 40.8 34.0 - 46.6 %    MCV 85.7 79.0 - 97.0 fL    MCH 29.2 26.6 - 33.0 pg    MCHC 34.1 31.5 - 35.7 g/dL    RDW 12.8 12.3 - 15.4 %    RDW-SD 38.8 37.0 - 54.0 fl    MPV 10.6 6.0 - 12.0 fL    Platelets 327 140 - 450 10*3/mm3    Neutrophil % 58.3 42.7 - 76.0 %    Lymphocyte % 30.8 19.6 - 45.3 %    Monocyte % 6.0 5.0 - 12.0 %    Eosinophil % 3.6 0.3 - 6.2 %    Basophil % 1.1 0.0 - 1.5 %    Immature Grans % 0.2 0.0 - 0.5 %    Neutrophils, Absolute 3.87 1.70 - 7.00 10*3/mm3    Lymphocytes, Absolute 2.04 0.70 - 3.10 10*3/mm3    Monocytes, Absolute 0.40 0.10 - 0.90 10*3/mm3    Eosinophils, Absolute 0.24 0.00 - 0.40 10*3/mm3    Basophils, Absolute 0.07 0.00 - 0.20 10*3/mm3    Immature Grans, Absolute 0.01 0.00 - 0.05 10*3/mm3    nRBC 0.2 0.0 - 0.2 /100  WBC   Hepatitis C Antibody    Specimen: Blood   Result Value Ref Range    Hepatitis C Ab Non-Reactive Non-Reactive   Vitamin D 25 Hydroxy    Specimen: Blood   Result Value Ref Range    25 Hydroxy, Vitamin D 40.4 30.0 - 100.0 ng/ml   TSH    Specimen: Blood   Result Value Ref Range    TSH 1.770 0.270 - 4.200 uIU/mL   Magnesium    Specimen: Blood   Result Value Ref Range    Magnesium 2.2 1.6 - 2.6 mg/dL   Iron    Specimen: Blood   Result Value Ref Range    Iron 100 37 - 145 mcg/dL   Vitamin B12    Specimen: Blood   Result Value Ref Range    Vitamin B-12 609 211 - 946 pg/mL   Lipid Panel    Specimen: Blood   Result Value Ref Range    Total Cholesterol 231 (H) 0 - 200 mg/dL    Triglycerides 500 (H) 0 - 150 mg/dL    HDL Cholesterol 41 40 - 60 mg/dL    LDL Cholesterol  105 (H) 0 - 100 mg/dL    VLDL Cholesterol 85 (H) 5 - 40 mg/dL    LDL/HDL Ratio 2.20    Comprehensive Metabolic Panel    Specimen: Blood   Result Value Ref Range    Glucose 78 65 - 99 mg/dL    BUN 16 6 - 20 mg/dL    Creatinine 0.69 0.57 - 1.00 mg/dL    Sodium 137 136 - 145 mmol/L    Potassium 4.2 3.5 - 5.2 mmol/L    Chloride 106 98 - 107 mmol/L    CO2 19.9 (L) 22.0 - 29.0 mmol/L    Calcium 9.5 8.6 - 10.5 mg/dL    Total Protein 7.0 6.0 - 8.5 g/dL    Albumin 4.10 3.50 - 5.20 g/dL    ALT (SGPT) 25 1 - 33 U/L    AST (SGOT) 21 1 - 32 U/L    Alkaline Phosphatase 43 39 - 117 U/L    Total Bilirubin 0.2 0.0 - 1.2 mg/dL    eGFR Non African Amer 97 >60 mL/min/1.73    Globulin 2.9 gm/dL    A/G Ratio 1.4 g/dL    BUN/Creatinine Ratio 23.2 7.0 - 25.0    Anion Gap 11.1 5.0 - 15.0 mmol/L     *Note: Due to a large number of results and/or encounters for the requested time period, some results have not been displayed. A complete set of results can be found in Results Review.         This document has been electronically signed by Jorge L Snyder MD on October 10, 2022 07:38 CDT

## 2022-10-11 ENCOUNTER — ANESTHESIA EVENT (OUTPATIENT)
Dept: GASTROENTEROLOGY | Facility: HOSPITAL | Age: 37
End: 2022-10-11

## 2022-10-11 ENCOUNTER — ANESTHESIA (OUTPATIENT)
Dept: GASTROENTEROLOGY | Facility: HOSPITAL | Age: 37
End: 2022-10-11

## 2022-10-11 ENCOUNTER — HOSPITAL ENCOUNTER (OUTPATIENT)
Facility: HOSPITAL | Age: 37
Setting detail: HOSPITAL OUTPATIENT SURGERY
Discharge: HOME | End: 2022-10-11
Attending: INTERNAL MEDICINE | Admitting: INTERNAL MEDICINE

## 2022-10-11 VITALS
RESPIRATION RATE: 20 BRPM | WEIGHT: 148 LBS | DIASTOLIC BLOOD PRESSURE: 77 MMHG | BODY MASS INDEX: 34.25 KG/M2 | OXYGEN SATURATION: 100 % | HEART RATE: 84 BPM | TEMPERATURE: 98.3 F | SYSTOLIC BLOOD PRESSURE: 128 MMHG | HEIGHT: 55 IN

## 2022-10-11 DIAGNOSIS — K59.09 OTHER CONSTIPATION: ICD-10-CM

## 2022-10-11 DIAGNOSIS — R10.84 GENERALIZED ABDOMINAL PAIN: ICD-10-CM

## 2022-10-11 DIAGNOSIS — R11.0 NAUSEA: ICD-10-CM

## 2022-10-11 PROCEDURE — 45380 COLONOSCOPY AND BIOPSY: CPT | Performed by: INTERNAL MEDICINE

## 2022-10-11 PROCEDURE — 43239 EGD BIOPSY SINGLE/MULTIPLE: CPT | Performed by: INTERNAL MEDICINE

## 2022-10-11 PROCEDURE — 25010000002 PROPOFOL 10 MG/ML EMULSION: Performed by: NURSE ANESTHETIST, CERTIFIED REGISTERED

## 2022-10-11 RX ORDER — LIDOCAINE HYDROCHLORIDE 20 MG/ML
INJECTION, SOLUTION EPIDURAL; INFILTRATION; INTRACAUDAL; PERINEURAL AS NEEDED
Status: DISCONTINUED | OUTPATIENT
Start: 2022-10-11 | End: 2022-10-11 | Stop reason: SURG

## 2022-10-11 RX ORDER — DEXTROSE AND SODIUM CHLORIDE 5; .45 G/100ML; G/100ML
30 INJECTION, SOLUTION INTRAVENOUS CONTINUOUS PRN
Status: DISCONTINUED | OUTPATIENT
Start: 2022-10-11 | End: 2022-10-11 | Stop reason: HOSPADM

## 2022-10-11 RX ORDER — PROPOFOL 10 MG/ML
VIAL (ML) INTRAVENOUS AS NEEDED
Status: DISCONTINUED | OUTPATIENT
Start: 2022-10-11 | End: 2022-10-11 | Stop reason: SURG

## 2022-10-11 RX ORDER — ONDANSETRON 2 MG/ML
4 INJECTION INTRAMUSCULAR; INTRAVENOUS ONCE AS NEEDED
Status: DISCONTINUED | OUTPATIENT
Start: 2022-10-11 | End: 2022-10-11 | Stop reason: HOSPADM

## 2022-10-11 RX ADMIN — PROPOFOL 80 MG: 10 INJECTION, EMULSION INTRAVENOUS at 16:45

## 2022-10-11 RX ADMIN — PROPOFOL 40 MG: 10 INJECTION, EMULSION INTRAVENOUS at 16:47

## 2022-10-11 RX ADMIN — PROPOFOL 20 MG: 10 INJECTION, EMULSION INTRAVENOUS at 16:54

## 2022-10-11 RX ADMIN — PROPOFOL 20 MG: 10 INJECTION, EMULSION INTRAVENOUS at 16:50

## 2022-10-11 RX ADMIN — PROPOFOL 50 MG: 10 INJECTION, EMULSION INTRAVENOUS at 16:48

## 2022-10-11 RX ADMIN — PROPOFOL 20 MG: 10 INJECTION, EMULSION INTRAVENOUS at 16:52

## 2022-10-11 RX ADMIN — PROPOFOL 30 MG: 10 INJECTION, EMULSION INTRAVENOUS at 16:46

## 2022-10-11 RX ADMIN — LIDOCAINE HYDROCHLORIDE 60 MG: 20 INJECTION, SOLUTION EPIDURAL; INFILTRATION; INTRACAUDAL; PERINEURAL at 16:45

## 2022-10-11 RX ADMIN — DEXTROSE AND SODIUM CHLORIDE 30 ML/HR: 5; 450 INJECTION, SOLUTION INTRAVENOUS at 15:43

## 2022-10-11 NOTE — ANESTHESIA POSTPROCEDURE EVALUATION
Patient: Ellie Saini    Procedure Summary     Date: 10/11/22 Room / Location: [facility] ENDOSCOPY 2 / [facility] ENDOSCOPY    Anesthesia Start: 1642 Anesthesia Stop: 1657    Procedures:       ESOPHAGOGASTRODUODENOSCOPY      COLONOSCOPY Diagnosis:       Generalized abdominal pain      Nausea      Other constipation      (Generalized abdominal pain [R10.84])      (Nausea [R11.0])      (Other constipation [K59.09])    Surgeons: Jorge L Snyder MD Provider: Adrienne Sheldon CRNA    Anesthesia Type: general ASA Status: 3          Anesthesia Type: general    Vitals  No vitals data found for the desired time range.          Post Anesthesia Care and Evaluation    Patient location during evaluation: bedside  Patient participation: complete - patient participated  Level of consciousness: sleepy but conscious  Pain score: 0  Pain management: adequate    Airway patency: patent  Anesthetic complications: No anesthetic complications  PONV Status: none  Cardiovascular status: hemodynamically stable  Respiratory status: room air  Hydration status: acceptable

## 2022-10-11 NOTE — ANESTHESIA PREPROCEDURE EVALUATION
Anesthesia Evaluation     Patient summary reviewed and Nursing notes reviewed   no history of anesthetic complications:  NPO Solid Status: > 8 hours  NPO Liquid Status: > 2 hours           Airway   Mallampati: II  TM distance: >3 FB  Neck ROM: full  Possible difficult intubation  Dental    (+) poor dentition    Pulmonary - negative pulmonary ROS and normal exam   (-) not a smoker  Cardiovascular - negative cardio ROS and normal exam    (-) hypertension, CAD      Neuro/Psych  (+) headaches, syncope, numbness, psychiatric history Anxiety,    GI/Hepatic/Renal/Endo    (+) obesity,  GERD poorly controlled,    (-) diabetes    Musculoskeletal     (+) back pain, neck pain,   Abdominal   (+) obese,    Substance History - negative use     OB/GYN negative ob/gyn ROS         Other - negative ROS       (-) history of cancer                Anesthesia Plan    ASA 3     general   total IV anesthesia  intravenous induction     Anesthetic plan, risks, benefits, and alternatives have been provided, discussed and informed consent has been obtained with: patient.        CODE STATUS:

## 2022-10-13 LAB — REF LAB TEST METHOD: NORMAL

## 2022-10-21 ENCOUNTER — OFFICE VISIT (OUTPATIENT)
Dept: GASTROENTEROLOGY | Facility: CLINIC | Age: 37
End: 2022-10-21

## 2022-10-21 VITALS
DIASTOLIC BLOOD PRESSURE: 88 MMHG | WEIGHT: 148.2 LBS | BODY MASS INDEX: 34.3 KG/M2 | SYSTOLIC BLOOD PRESSURE: 124 MMHG | HEIGHT: 55 IN | HEART RATE: 91 BPM

## 2022-10-21 DIAGNOSIS — R10.10 PAIN OF UPPER ABDOMEN: ICD-10-CM

## 2022-10-21 DIAGNOSIS — R11.0 NAUSEA: Primary | ICD-10-CM

## 2022-10-21 PROCEDURE — 99214 OFFICE O/P EST MOD 30 MIN: CPT | Performed by: INTERNAL MEDICINE

## 2022-11-06 NOTE — PROGRESS NOTES
Chief Complaint   Patient presents with   • Abdominal Pain   • Nausea       Subjective    Elliebhavik Saini is a 36 y.o. female.    History of Present Illness  Patient presented to GI clinic for follow-up visit today.  Has intermittent symptoms with upper abdominal pain with nausea.  Also has heartburn.  Denied vomiting, diarrhea, constipation, rectal bleeding or weight loss.  EGD was consistent with hiatal hernia, esophagitis and gastritis.  Path was consistent with reactive gastropathy.  Colonoscopy was consistent with hemorrhoids and poor prep.  CT abdomen pelvis was unremarkable.       The following portions of the patient's history were reviewed and updated as appropriate:   Past Medical History:   Diagnosis Date   • Acute bronchitis    • Acute maxillary sinusitis    • Acute otitis media    • Acute pharyngitis    • Allergic rhinitis    • Breast lump    • Cramp in limb     Cramp in lower limb - bilateral      • Hormone replacement therapy (HRT)     H/O: hormone replacement (HRT)   • Insomnia    • Lymphadenopathy    • Malaise     Other malaise   • Migraine    • Postoperative visit    • Upper respiratory infection      Past Surgical History:   Procedure Laterality Date   •  SECTION      X 2   • COLONOSCOPY  2008   • COLONOSCOPY N/A 2019    Procedure: COLONOSCOPY;  Surgeon: Cong Devine DO;  Location: [facility] ENDOSCOPY;  Service: Gastroenterology   • COLONOSCOPY N/A 10/11/2022    Procedure: COLONOSCOPY;  Surgeon: Jorge L Snyder MD;  Location: [facility] ENDOSCOPY;  Service: Gastroenterology;  Laterality: N/A;   • DIAGNOSTIC LAPAROSCOPY  2014   • ENDOSCOPY N/A 2019    Procedure: ESOPHAGOGASTRODUODENOSCOPY;  Surgeon: Cong Devine DO;  Location: [facility] ENDOSCOPY;  Service: Gastroenterology   • ENDOSCOPY N/A 10/11/2022    Procedure: ESOPHAGOGASTRODUODENOSCOPY;  Surgeon: Jorge L Snyder MD;  Location: [facility] ENDOSCOPY;  Service: Gastroenterology;  Laterality: N/A;   •  LAPAROSCOPIC CHOLECYSTECTOMY  2007   • PAP SMEAR  2009   • PROCEDURE GENERIC CONVERTED  2013    REMOVE INTRAUTERINE DEVICE    • TOTAL ABDOMINAL HYSTERECTOMY WITH SALPINGO OOPHORECTOMY  2014   • UPPER GASTROINTESTINAL ENDOSCOPY  2019    Dr. Devine     Family History   Problem Relation Age of Onset   • Suicidality Father    • Diabetes Other    • Stroke Other      OB History        2    Para   2    Term   2            AB        Living           SAB        IAB        Ectopic        Molar        Multiple        Live Births                  Prior to Admission medications    Medication Sig Start Date End Date Taking? Authorizing Provider   albuterol sulfate  (90 Base) MCG/ACT inhaler Inhale 2 puffs.   Yes Provider, MD Margareth   cetirizine (zyrTEC) 10 MG tablet TAKE 1 TABLET BY MOUTH EVERY DAY 22  Yes Linda Pennington APRN   cyclobenzaprine (FLEXERIL) 5 MG tablet TAKE 1 - 2 TABLETS BY MOUTH UP TO 3 TIMES A DAY AS NEEDED FOR MUSCLE SPASMS 10/3/22  Yes Linda Pennington APRN   dicyclomine (BENTYL) 20 MG tablet Take 1 tablet by mouth Every 6 (Six) Hours As Needed (abdominal pain, cramping, diarrhea). 22  Yes Tolu Stoddard MD   docusate sodium (COLACE) 100 MG capsule TAKE 1 CAPSULE BY MOUTH TWICE A DAY 22  Yes Linda Pennington APRN   esomeprazole (nexIUM) 40 MG capsule TAKE 1 CAPSULE BY MOUTH EVERY DAY 21  Yes Jorge L Snyder MD   estradiol (ESTRACE) 1 MG tablet TAKE 1 TABLET BY MOUTH EVERY DAY 22  Yes Linda Pennington APRN   fluticasone (Flonase) 50 MCG/ACT nasal spray 2 sprays into the nostril(s) as directed by provider Daily. 6/10/22  Yes Linda Pennington APRN   Fremanezumab-vfrm (Ajovy) 225 MG/1.5ML solution auto-injector Inject 1 application under the skin into the appropriate area as directed Every 30 (Thirty) Days. 21  Yes Linda Pennington APRN   Fremanezumab-vfrm 225 MG/1.5ML solution  auto-injector Inject 225 mg under the skin into the appropriate area as directed Every 30 (Thirty) Days. 6/10/22  Yes Linda Pennington APRN   hyoscyamine (LEVBID) 0.375 MG 12 hr tablet Take 1 tablet by mouth Every 12 (Twelve) Hours As Needed for Cramping. 9/2/22  Yes Linda Pennington APRN   linaclotide (LINZESS) 290 MCG capsule capsule Take 1 capsule by mouth Every Morning Before Breakfast. 9/23/22  Yes Jorge L Snyder MD   meclizine (ANTIVERT) 25 MG tablet Take 1 tablet by mouth 3 (Three) Times a Day As Needed for Dizziness (tid prn dizziness). 1/5/22  Yes Linda Pennington APRN   meloxicam (MOBIC) 7.5 MG tablet TAKE 1 TABLET BY MOUTH EVERY DAY 1/12/22  Yes Linda Pennington APRN   metoclopramide (REGLAN) 5 MG tablet TAKE 1 TABLET BY MOUTH TWICE A DAY 1/25/22  Yes Linda Pennington APRN   metroNIDAZOLE (FLAGYL) 500 MG tablet Take 1 tablet by mouth 2 (Two) Times a Day. 8/24/22  Yes Tolu Stoddard MD   ondansetron ODT (ZOFRAN-ODT) 4 MG disintegrating tablet Place 1 tablet on the tongue Every 6 (Six) Hours As Needed for Nausea or Vomiting. 8/24/22  Yes Tolu Stoddard MD   PARoxetine (Paxil) 20 MG tablet Take 1 tablet by mouth Every Morning. 6/10/22  Yes Linda Pennington APRN   polyethylene glycol (GoLYTELY) 236 g solution Please use the instructions given in office 9/23/22  Yes Jorge L Snyder MD   rizatriptan (Maxalt) 10 MG tablet Take 1 tablet by mouth 1 (One) Time As Needed for Migraine. May repeat in 2 hours if needed 6/10/22  Yes Linda Pennington APRN   sucralfate (Carafate) 1 GM/10ML suspension Take 10 mL by mouth 4 (Four) Times a Day. 9/2/22  Yes Linda Pennington APRN   TiZANidine (ZANAFLEX) 4 MG capsule Take 1 capsule by mouth 3 (Three) Times a Day As Needed for Muscle Spasms. 8/11/22  Yes Martha Johns APRN   topiramate (TOPAMAX) 100 MG tablet Take 1 tablet by mouth 2 (Two) Times a Day. 6/10/22  Yes Linda Pennington APRN     Allergies  "  Allergen Reactions   • Hydrocodone GI Intolerance   • Other Other (See Comments)     Seasonal Allergies   • Shrimp Unknown - High Severity     Social History     Socioeconomic History   • Marital status: Single   • Number of children: 3   • Highest education level: Some college, no degree   Tobacco Use   • Smoking status: Never   • Smokeless tobacco: Never   Vaping Use   • Vaping Use: Never used   Substance and Sexual Activity   • Alcohol use: No   • Drug use: Never   • Sexual activity: Defer       Review of Systems  Review of Systems   Constitutional: Negative for chills, fatigue, fever and unexpected weight change.   HENT: Negative for congestion, ear discharge, hearing loss, nosebleeds and sore throat.    Eyes: Negative for pain, discharge and redness.   Respiratory: Negative for cough, chest tightness, shortness of breath and wheezing.    Cardiovascular: Negative for chest pain and palpitations.   Gastrointestinal: Positive for abdominal pain and nausea. Negative for abdominal distention, blood in stool, constipation, diarrhea and vomiting.   Endocrine: Negative for cold intolerance, polydipsia, polyphagia and polyuria.   Genitourinary: Negative for dysuria, flank pain, frequency, hematuria and urgency.   Musculoskeletal: Negative for arthralgias, back pain, joint swelling and myalgias.   Skin: Negative for color change, pallor and rash.   Neurological: Negative for tremors, seizures, syncope, weakness and headaches.   Hematological: Negative for adenopathy. Does not bruise/bleed easily.   Psychiatric/Behavioral: Negative for behavioral problems, confusion, dysphoric mood, hallucinations and suicidal ideas. The patient is not nervous/anxious.    Has GERD.     /88 (BP Location: Left arm)   Pulse 91   Ht 139.7 cm (55\")   Wt 67.2 kg (148 lb 3.2 oz)   BMI 34.44 kg/m²     Objective    Physical Exam  Constitutional:       Appearance: She is well-developed.   HENT:      Head: Normocephalic and atraumatic. "   Eyes:      Conjunctiva/sclera: Conjunctivae normal.      Pupils: Pupils are equal, round, and reactive to light.   Neck:      Thyroid: No thyromegaly.   Cardiovascular:      Rate and Rhythm: Normal rate and regular rhythm.      Heart sounds: Normal heart sounds. No murmur heard.  Pulmonary:      Effort: Pulmonary effort is normal.      Breath sounds: Normal breath sounds. No wheezing.   Abdominal:      General: Bowel sounds are normal. There is no distension.      Palpations: Abdomen is soft. There is no mass.      Tenderness: There is no abdominal tenderness.      Hernia: No hernia is present.   Genitourinary:     Comments: No lesions noted  Musculoskeletal:         General: No tenderness. Normal range of motion.      Cervical back: Normal range of motion and neck supple.   Lymphadenopathy:      Cervical: No cervical adenopathy.   Skin:     General: Skin is warm and dry.      Findings: No rash.   Neurological:      Mental Status: She is alert and oriented to person, place, and time.      Cranial Nerves: No cranial nerve deficit.   Psychiatric:         Thought Content: Thought content normal.       Admission on 10/11/2022, Discharged on 10/11/2022   Component Date Value Ref Range Status   • Reference Lab Report 10/11/2022    Final                    Value:[facility]  [address] [place] [address]  Phone: [phone] or [phone]  Fax: [phone]  Monroe Rico M.D., Medical Director    PATIENT NAME                           LABORATORY NO.  SHARONA MARISCAL.                 [id number]  [id number]                         AGE              SEX  N           CLIENT REF #  [facility]           36      1985  F    [id number]   [id number]    [place]                       REQUESTING M.D.     ATTENDING MRomeroD.     COPY TO[address]                 [place] [address]             [facility]  DATE  "COLLECTED      DATE RECEIVED      DATE REPORTED  10/11/2022          10/12/2022         10/13/2022    DIAGNOSIS:  A.   DUODENUM, BIOPSY:  Small bowel mucosa with increased intraepithelial lymphocytes, see comment  B.   GASTRIC ANTRUM, BIOPSY:  Reactive gastropathy  No Helicobacter pylori like organisms identified on H&E stained                           slide  No intestinal metaplasia or dysplasia identified  C.   GE JUNCTION:  Gastroesophageal mucosa with mild reactive related changes  No increased intraepithelial eosinophils, intestinal metaplasia or  dysplasia identified  D.   TERMINAL ILEUM BIOPSY:  Small bowel mucosa with no significant pathologic abnormality  E.   COLON, BIOPSY, MUCOSA:  Colonic mucosa with no significant pathologic abnormality    COMMENT:  No villous blunting is appreciated.  Clinical and endoscopic  correlation is required.    CLINICAL HISTORY:  Generalized abdominal pain  Nausea  Other constipation    SPECIMENS RECEIVED:  A.  DUODENUM, BIOPSY  B.  GASTRIC ANTRUM, BIOPSY  C.  GE JUNCTION  D.  TERMINAL ILEUM BIOPSY  E.  COLON, BIOPSY, MUCOSA    MICROSCOPIC DESCRIPTION:  Tissue blocks are prepared and slides are examined microscopically on all  specimens. See diagnosis for details.    Professional interpretation rendered by Cinthia Carmichael D.O., F.C.A.P. at  [employer], [employer], [address] [place] [address].    GROSS DESCRIPTION:  A.  Specimen is received in 1 formalin filled container labeled \"duodenum\"  and consists of 2 portions of tan soft tissue measuring 0.6 x 0.4 x 0.2 cm in  aggregate.  Submitted entirely in 1 cassette.  MTH  B.  Specimen is received in 1 formalin filled container labeled \"gastric,  antrum\" and consists of 2 portions of tan soft tissue measuring 0.4 x 0.3 x  0.2 cm in aggregate.  Submitted entirely in 1 cassette.  C.  Specimen is received in 1 formalin filled container labeled \"EGJ\" and  consists of 2 portions of gray-tan soft tissue " "measuring 0.4 x 0.4 x 0.1 cm  in aggregate.  Submitted entirely in 1 cassette.  D.  Specimen is received in 1 formalin filled container labeled \"TI\" and  consists of 2 portions of tan soft tissue measuring 0.4 x 0.4 x 0.2 cm in  aggregate.  Submitted entirely in 1 cassette.  E.  Specimen is received in 1 formalin filled container labeled \"colonic  mucosa\" and consists of 3 portions of tan soft tissue measuring 0.5 x 0.4 x  0.2                           cm in aggregate.  Submitted entirely in 1 cassette.    REVIEWED, DIAGNOSED AND ELECTRONICALLY  SIGNED BY:    Cinthia Carmichael D.O., LUISITOAANGELINA.  CPT CODES:  88305x5       Assessment & Plan    No diagnosis found..   1.  Upper abdominal pain with nausea, likely due to reactive gastropathy.  Could also be due to pancreaticobiliary pathology.  Patient underwent cholecystectomy for gallstones in the past.  Continue Nexium and add Carafate.  Obtain LFTs and right upper quadrant sonogram if symptoms continue.  2.  GERD, fairly well controlled.  Continue PPI.  Obtain gastric emptying scan.  3.  Abdominal pain with diarrhea alternating with constipation, well controlled.  Likely due to IBS.  Continue Bentyl and high-fiber diet.  4.  Obesity, recommend exercise and diet control.  5.  History of pelvic floor dysfunction, patient to continue pelvic floor physical therapy.    Orders placed during this encounter include:  No orders of the defined types were placed in this encounter.      * Surgery not found *    Review and/or summary of lab tests, radiology, procedures, medications. Review and summary of old records and obtaining of history. The risks and benefits of my recommendations, as well as other treatment options were discussed with the patient today. Questions were answered.    No orders of the defined types were placed in this encounter.      Follow-up: Return in about 2 months (around 12/21/2022).               Results for orders placed or performed during the hospital " encounter of 10/11/22   TISSUE EXAM, [facility] ([facility])    Specimen: A: Small Intestine, Duodenum; Tissue    B: Gastric, Antrum; Tissue    C: Esophagus; Tissue    D: Small Intestine, Ileum; Tissue    E: Large Intestine; Tissue   Result Value Ref Range    Reference Lab Report       [facility]  [address] [place] [address]  Phone: [phone] or [phone]  Fax: [phone]  Monroe Rico M.D., Medical Director    PATIENT NAME                           LABORATORY NO.  SHARONA MARISCAL.                 [id number]  [id number]                         AGE              SEX  SSN           CLIENT REF #  [facility]           36      1985  F    [id number]   [id number]    [place]                       REQUESTING M.D.     ATTENDING M.D.     COPY TO.  [address] [place] [address]                 [address]  DATE COLLECTED      DATE RECEIVED      DATE REPORTED  10/11/2022          10/12/2022         10/13/2022    DIAGNOSIS:  A.   DUODENUM, BIOPSY:  Small bowel mucosa with increased intraepithelial lymphocytes, see comment  B.   GASTRIC ANTRUM, BIOPSY:  Reactive gastropathy  No Helicobacter pylori like organisms identified on H&E stained  slide  No intestinal metaplasia or dysplasia identified  C.   GE JUNCTION:  Gastroesophageal mucosa with mild reactive related changes  No increased intraepithelial eosinophils, intestinal metaplasia or  dysplasia identified  D.   TERMINAL ILEUM BIOPSY:  Small bowel mucosa with no significant pathologic abnormality  E.   COLON, BIOPSY, MUCOSA:  Colonic mucosa with no significant pathologic abnormality    COMMENT:  No villous blunting is appreciated.  Clinical and endoscopic  correlation is required.    CLINICAL HISTORY:  Generalized abdominal pain  Nausea  Other constipation    SPECIMENS RECEIVED:  A.  DUODENUM, BIOPSY  B.  GASTRIC ANTRUM, BIOPSY  C.   "GE JUNCTION  D.  TERMINAL ILEUM BIOPSY  E.  COLON, BIOPSY, MUCOSA    MICROSCOPIC DESCRIPTION:  Tissue blocks are prepared and slides are examined microscopically on all  specimens. See diagnosis for details.    Professional interpretation rendered by Cinthia Carmichael D.O., CARLITOP. at  [employer], [address] [place] [address].    GROSS DESCRIPTION:  A.  Specimen is received in 1 formalin filled container labeled \"duodenum\"  and consists of 2 portions of tan soft tissue measuring 0.6 x 0.4 x 0.2 cm in  aggregate.  Submitted entirely in 1 cassette.  MTH  B.  Specimen is received in 1 formalin filled container labeled \"gastric,  antrum\" and consists of 2 portions of tan soft tissue measuring 0.4 x 0.3 x  0.2 cm in aggregate.  Submitted entirely in 1 cassette.  C.  Specimen is received in 1 formalin filled container labeled \"EGJ\" and  consists of 2 portions of gray-tan soft tissue measuring 0.4 x 0.4 x 0.1 cm  in aggregate.  Submitted entirely in 1 cassette.  D.  Specimen is received in 1 formalin filled container labeled \"TI\" and  consists of 2 portions of tan soft tissue measuring 0.4 x 0.4 x 0.2 cm in  aggregate.  Submitted entirely in 1 cassette.  E.  Specimen is received in 1 formalin filled container labeled \"colonic  mucosa\" and consists of 3 portions of tan soft tissue measuring 0.5 x 0.4 x  0.2  cm in aggregate.  Submitted entirely in 1 cassette.    REVIEWED, DIAGNOSED AND ELECTRONICALLY  SIGNED BY:    Cinthia Carmichael D.O., F.C.A.P.  CPT CODES:  88305x5     Results for orders placed or performed during the hospital encounter of 08/24/22   Gold Top - SST   Result Value Ref Range    Extra Tube Hold for add-ons.    Green Top (Gel)   Result Value Ref Range    Extra Tube Hold for add-ons.    COVID-19 and FLU A/B PCR - Swab, Nasopharynx    Specimen: Nasopharynx; Swab   Result Value Ref Range    COVID19 Not Detected Not Detected - Ref. Range    Influenza A PCR Not Detected Not Detected    Influenza B " PCR Not Detected Not Detected   CBC Auto Differential    Specimen: Blood   Result Value Ref Range    WBC 8.33 3.40 - 10.80 10*3/mm3    RBC 4.69 3.77 - 5.28 10*6/mm3    Hemoglobin 13.5 12.0 - 15.9 g/dL    Hematocrit 40.7 34.0 - 46.6 %    MCV 86.8 79.0 - 97.0 fL    MCH 28.8 26.6 - 33.0 pg    MCHC 33.2 31.5 - 35.7 g/dL    RDW 13.8 12.3 - 15.4 %    RDW-SD 43.3 37.0 - 54.0 fl    MPV 9.5 6.0 - 12.0 fL    Platelets 340 140 - 450 10*3/mm3    Neutrophil % 64.6 42.7 - 76.0 %    Lymphocyte % 24.0 19.6 - 45.3 %    Monocyte % 7.0 5.0 - 12.0 %    Eosinophil % 3.4 0.3 - 6.2 %    Basophil % 0.6 0.0 - 1.5 %    Immature Grans % 0.4 0.0 - 0.5 %    Neutrophils, Absolute 5.39 1.70 - 7.00 10*3/mm3    Lymphocytes, Absolute 2.00 0.70 - 3.10 10*3/mm3    Monocytes, Absolute 0.58 0.10 - 0.90 10*3/mm3    Eosinophils, Absolute 0.28 0.00 - 0.40 10*3/mm3    Basophils, Absolute 0.05 0.00 - 0.20 10*3/mm3    Immature Grans, Absolute 0.03 0.00 - 0.05 10*3/mm3    nRBC 0.0 0.0 - 0.2 /100 WBC   Lavender Top   Result Value Ref Range    Extra Tube hold for add-on    Light Blue Top   Result Value Ref Range    Extra Tube Hold for add-ons.    Lipase    Specimen: Blood   Result Value Ref Range    Lipase 30 13 - 60 U/L   Lactic Acid, Plasma    Specimen: Blood   Result Value Ref Range    Lactate 1.6 0.5 - 2.0 mmol/L   Comprehensive Metabolic Panel    Specimen: Blood   Result Value Ref Range    Glucose 94 65 - 99 mg/dL    BUN 20 6 - 20 mg/dL    Creatinine 0.69 0.57 - 1.00 mg/dL    Sodium 139 136 - 145 mmol/L    Potassium 3.3 (L) 3.5 - 5.2 mmol/L    Chloride 103 98 - 107 mmol/L    CO2 21.0 (L) 22.0 - 29.0 mmol/L    Calcium 8.6 8.6 - 10.5 mg/dL    Total Protein 6.9 6.0 - 8.5 g/dL    Albumin 4.30 3.50 - 5.20 g/dL    ALT (SGPT) 22 1 - 33 U/L    AST (SGOT) 24 1 - 32 U/L    Alkaline Phosphatase 62 39 - 117 U/L    Total Bilirubin 0.3 0.0 - 1.2 mg/dL    Globulin 2.6 gm/dL    A/G Ratio 1.7 g/dL    BUN/Creatinine Ratio 29.0 (H) 7.0 - 25.0    Anion Gap 15.0 5.0 - 15.0  mmol/L    eGFR 115.5 >60.0 mL/min/1.73   Results for orders placed or performed in visit on 08/24/22   TB Skin Test    Specimen: Blood   Result Value Ref Range    TB Skin Test Negative     Induration 0 0 - 10 mm    Injection Date & Time 08/24/2022     Read Date & Time 08/26/2022    Results for orders placed or performed in visit on 07/26/21   TB Skin Test    Specimen: Blood   Result Value Ref Range    TB Skin Test Negative     Induration 0 0 - 10 mm    Injection Date & Time 07/26/2021     Read Date & Time 02/28/2021    Results for orders placed or performed in visit on 02/04/21   CBC Auto Differential    Specimen: Blood   Result Value Ref Range    WBC 6.63 3.40 - 10.80 10*3/mm3    RBC 4.76 3.77 - 5.28 10*6/mm3    Hemoglobin 13.9 12.0 - 15.9 g/dL    Hematocrit 40.8 34.0 - 46.6 %    MCV 85.7 79.0 - 97.0 fL    MCH 29.2 26.6 - 33.0 pg    MCHC 34.1 31.5 - 35.7 g/dL    RDW 12.8 12.3 - 15.4 %    RDW-SD 38.8 37.0 - 54.0 fl    MPV 10.6 6.0 - 12.0 fL    Platelets 327 140 - 450 10*3/mm3    Neutrophil % 58.3 42.7 - 76.0 %    Lymphocyte % 30.8 19.6 - 45.3 %    Monocyte % 6.0 5.0 - 12.0 %    Eosinophil % 3.6 0.3 - 6.2 %    Basophil % 1.1 0.0 - 1.5 %    Immature Grans % 0.2 0.0 - 0.5 %    Neutrophils, Absolute 3.87 1.70 - 7.00 10*3/mm3    Lymphocytes, Absolute 2.04 0.70 - 3.10 10*3/mm3    Monocytes, Absolute 0.40 0.10 - 0.90 10*3/mm3    Eosinophils, Absolute 0.24 0.00 - 0.40 10*3/mm3    Basophils, Absolute 0.07 0.00 - 0.20 10*3/mm3    Immature Grans, Absolute 0.01 0.00 - 0.05 10*3/mm3    nRBC 0.2 0.0 - 0.2 /100 WBC   Hepatitis C Antibody    Specimen: Blood   Result Value Ref Range    Hepatitis C Ab Non-Reactive Non-Reactive   Vitamin D 25 Hydroxy    Specimen: Blood   Result Value Ref Range    25 Hydroxy, Vitamin D 40.4 30.0 - 100.0 ng/ml   TSH    Specimen: Blood   Result Value Ref Range    TSH 1.770 0.270 - 4.200 uIU/mL   Magnesium    Specimen: Blood   Result Value Ref Range    Magnesium 2.2 1.6 - 2.6 mg/dL   Iron    Specimen:  Blood   Result Value Ref Range    Iron 100 37 - 145 mcg/dL   Vitamin B12    Specimen: Blood   Result Value Ref Range    Vitamin B-12 609 211 - 946 pg/mL   Lipid Panel    Specimen: Blood   Result Value Ref Range    Total Cholesterol 231 (H) 0 - 200 mg/dL    Triglycerides 500 (H) 0 - 150 mg/dL    HDL Cholesterol 41 40 - 60 mg/dL    LDL Cholesterol  105 (H) 0 - 100 mg/dL    VLDL Cholesterol 85 (H) 5 - 40 mg/dL    LDL/HDL Ratio 2.20    Comprehensive Metabolic Panel    Specimen: Blood   Result Value Ref Range    Glucose 78 65 - 99 mg/dL    BUN 16 6 - 20 mg/dL    Creatinine 0.69 0.57 - 1.00 mg/dL    Sodium 137 136 - 145 mmol/L    Potassium 4.2 3.5 - 5.2 mmol/L    Chloride 106 98 - 107 mmol/L    CO2 19.9 (L) 22.0 - 29.0 mmol/L    Calcium 9.5 8.6 - 10.5 mg/dL    Total Protein 7.0 6.0 - 8.5 g/dL    Albumin 4.10 3.50 - 5.20 g/dL    ALT (SGPT) 25 1 - 33 U/L    AST (SGOT) 21 1 - 32 U/L    Alkaline Phosphatase 43 39 - 117 U/L    Total Bilirubin 0.2 0.0 - 1.2 mg/dL    eGFR Non African Amer 97 >60 mL/min/1.73    Globulin 2.9 gm/dL    A/G Ratio 1.4 g/dL    BUN/Creatinine Ratio 23.2 7.0 - 25.0    Anion Gap 11.1 5.0 - 15.0 mmol/L     *Note: Due to a large number of results and/or encounters for the requested time period, some results have not been displayed. A complete set of results can be found in Results Review.         This document has been electronically signed by Jorge L Snyder MD on November 5, 2022 22:19 CDT

## 2022-11-09 ENCOUNTER — LAB (OUTPATIENT)
Dept: LAB | Facility: HOSPITAL | Age: 37
End: 2022-11-09

## 2022-11-09 ENCOUNTER — OFFICE VISIT (OUTPATIENT)
Dept: FAMILY MEDICINE CLINIC | Facility: CLINIC | Age: 37
End: 2022-11-09

## 2022-11-09 VITALS
OXYGEN SATURATION: 98 % | BODY MASS INDEX: 34.25 KG/M2 | WEIGHT: 148 LBS | HEART RATE: 93 BPM | HEIGHT: 55 IN | SYSTOLIC BLOOD PRESSURE: 110 MMHG | DIASTOLIC BLOOD PRESSURE: 80 MMHG

## 2022-11-09 DIAGNOSIS — M79.602 PAIN IN BOTH UPPER EXTREMITIES: Primary | ICD-10-CM

## 2022-11-09 DIAGNOSIS — M79.601 PAIN IN BOTH UPPER EXTREMITIES: Primary | ICD-10-CM

## 2022-11-09 PROCEDURE — 86757 RICKETTSIA ANTIBODY: CPT | Performed by: NURSE PRACTITIONER

## 2022-11-09 PROCEDURE — 99213 OFFICE O/P EST LOW 20 MIN: CPT | Performed by: NURSE PRACTITIONER

## 2022-11-09 PROCEDURE — 86431 RHEUMATOID FACTOR QUANT: CPT | Performed by: NURSE PRACTITIONER

## 2022-11-09 PROCEDURE — 82607 VITAMIN B-12: CPT | Performed by: NURSE PRACTITIONER

## 2022-11-09 PROCEDURE — 36415 COLL VENOUS BLD VENIPUNCTURE: CPT | Performed by: NURSE PRACTITIONER

## 2022-11-09 PROCEDURE — 85025 COMPLETE CBC W/AUTO DIFF WBC: CPT | Performed by: NURSE PRACTITIONER

## 2022-11-09 PROCEDURE — 85652 RBC SED RATE AUTOMATED: CPT | Performed by: NURSE PRACTITIONER

## 2022-11-09 PROCEDURE — 82306 VITAMIN D 25 HYDROXY: CPT | Performed by: NURSE PRACTITIONER

## 2022-11-09 PROCEDURE — 83036 HEMOGLOBIN GLYCOSYLATED A1C: CPT | Performed by: NURSE PRACTITIONER

## 2022-11-09 PROCEDURE — 83540 ASSAY OF IRON: CPT | Performed by: NURSE PRACTITIONER

## 2022-11-09 PROCEDURE — 86038 ANTINUCLEAR ANTIBODIES: CPT | Performed by: NURSE PRACTITIONER

## 2022-11-09 PROCEDURE — 80053 COMPREHEN METABOLIC PANEL: CPT | Performed by: NURSE PRACTITIONER

## 2022-11-09 PROCEDURE — 84443 ASSAY THYROID STIM HORMONE: CPT | Performed by: NURSE PRACTITIONER

## 2022-11-09 PROCEDURE — 83735 ASSAY OF MAGNESIUM: CPT | Performed by: NURSE PRACTITIONER

## 2022-11-09 NOTE — PROGRESS NOTES
Chief Complaint   Patient presents with   • leg and arms cramp     Subjective   Elliebhavik Saini is a 36 y.o. female.           Leg Pain   The incident occurred more than 1 week ago. There was no injury mechanism. The pain is present in the left leg and right leg. The quality of the pain is described as burning and cramping. The pain is at a severity of 2/10. The pain is moderate. The pain has been intermittent since onset. Associated symptoms include muscle weakness and tingling. Pertinent negatives include no inability to bear weight, loss of motion, loss of sensation or numbness. The symptoms are aggravated by movement. She has tried acetaminophen and NSAIDs for the symptoms. The treatment provided mild relief.   Arm Pain   The incident occurred more than 1 week ago. The pain is present in the right forearm and left forearm. The quality of the pain is described as burning. The pain is at a severity of 4/10. The pain has been worsening since the incident. Associated symptoms include muscle weakness and tingling. Pertinent negatives include no chest pain or numbness. The symptoms are aggravated by movement. She has tried acetaminophen and ice for the symptoms. The treatment provided mild relief.        The following portions of the patient's history were reviewed and updated as appropriate: allergies, current medications, past social history and problem list.    Review of Systems   Constitutional: Positive for fatigue. Negative for appetite change, chills, fever and unexpected weight change.   HENT: Negative for congestion, dental problem, drooling, facial swelling, hearing loss, mouth sores, nosebleeds, sneezing and sore throat.    Eyes: Negative.  Negative for photophobia, redness, itching and visual disturbance.   Respiratory: Negative.  Negative for cough, choking, shortness of breath, wheezing and stridor.    Cardiovascular: Negative.  Negative for chest pain, palpitations and leg swelling.  "  Gastrointestinal: Positive for abdominal distention, abdominal pain, nausea and vomiting. Negative for anal bleeding, blood in stool, constipation and rectal pain.        Generalized abdominal pain and gerd symptoms worsening    Endocrine: Negative.  Negative for heat intolerance, polydipsia, polyphagia and polyuria.   Genitourinary: Negative for dysuria, frequency and hematuria.   Musculoskeletal: Positive for arthralgias, back pain, gait problem, joint swelling and myalgias. Negative for neck pain and neck stiffness.        Joint pain and muscle pain    Skin: Negative.  Negative for color change, pallor and rash.   Allergic/Immunologic: Negative.  Negative for environmental allergies, food allergies and immunocompromised state.   Neurological: Positive for tingling. Negative for dizziness, syncope, facial asymmetry, weakness, light-headedness, numbness and headaches.   Hematological: Negative.  Negative for adenopathy. Does not bruise/bleed easily.   Psychiatric/Behavioral: Negative.  Negative for agitation, behavioral problems, confusion, decreased concentration, dysphoric mood, hallucinations, self-injury, sleep disturbance and suicidal ideas. The patient is not nervous/anxious and is not hyperactive.        Objective   /80   Pulse 93   Ht 139.7 cm (55\")   Wt 67.1 kg (148 lb)   SpO2 98%   BMI 34.40 kg/m²   Physical Exam  Vitals and nursing note reviewed.   Constitutional:       General: She is not in acute distress.     Appearance: Normal appearance. She is not ill-appearing, toxic-appearing or diaphoretic.   HENT:      Head: Normocephalic and atraumatic.      Comments: Fluid bilateral tm's      Right Ear: Tympanic membrane and ear canal normal. There is no impacted cerumen.      Left Ear: Tympanic membrane normal. There is no impacted cerumen.      Nose: Nose normal. No congestion or rhinorrhea.      Mouth/Throat:      Mouth: Mucous membranes are moist.      Pharynx: No oropharyngeal exudate or " posterior oropharyngeal erythema.   Eyes:      General: No scleral icterus.        Right eye: No discharge.         Left eye: No discharge.      Pupils: Pupils are equal, round, and reactive to light.   Neck:      Vascular: No carotid bruit.   Cardiovascular:      Rate and Rhythm: Normal rate and regular rhythm.      Pulses: Normal pulses.      Heart sounds: Normal heart sounds. No murmur heard.    No friction rub. No gallop.   Pulmonary:      Effort: Pulmonary effort is normal. No respiratory distress.      Breath sounds: Normal breath sounds. No stridor. No wheezing, rhonchi or rales.   Chest:      Chest wall: No tenderness.   Abdominal:      General: Abdomen is flat. Bowel sounds are normal. There is no distension.      Palpations: Abdomen is soft. There is no shifting dullness, fluid wave, hepatomegaly, splenomegaly, mass or pulsatile mass.      Tenderness: There is abdominal tenderness in the right upper quadrant, right lower quadrant, epigastric area, periumbilical area, suprapubic area, left upper quadrant and left lower quadrant. There is no right CVA tenderness, left CVA tenderness, guarding or rebound. Negative signs include Lindo's sign, Rovsing's sign, McBurney's sign and psoas sign.      Hernia: No hernia is present. There is no hernia in the umbilical area or ventral area.       Musculoskeletal:         General: Tenderness present. No swelling, deformity or signs of injury. Normal range of motion.      Cervical back: Normal range of motion. No rigidity or tenderness.      Right lower leg: No edema.      Left lower leg: No edema.      Comments: Upper and lower extremity pain with movement    Lymphadenopathy:      Cervical: No cervical adenopathy.   Skin:     General: Skin is warm and dry.      Coloration: Skin is not jaundiced or pale.      Findings: No bruising, erythema, lesion or rash.   Neurological:      General: No focal deficit present.      Mental Status: She is alert and oriented to person,  place, and time. Mental status is at baseline.      Cranial Nerves: No cranial nerve deficit.      Sensory: No sensory deficit.      Motor: No weakness.      Coordination: Coordination normal.      Gait: Gait normal.      Deep Tendon Reflexes: Reflexes normal.      Comments: Dizziness reproducable with exam    Psychiatric:         Mood and Affect: Mood normal.         Behavior: Behavior normal.              Assessment & Plan     Problems Addressed this Visit    None  Visit Diagnoses     Pain in both upper extremities    -  Primary    Relevant Orders    CBC & Differential    Comprehensive Metabolic Panel    Hemoglobin A1c    Iron    TSH    Vitamin B12    Vitamin D,25-Hydroxy    Magnesium    Sedimentation Rate    [facility] (IgG / M)    URI    Rheumatoid Factor      Diagnoses       Codes Comments    Pain in both upper extremities    -  Primary ICD-10-CM: M79.601, M79.602  ICD-9-CM: 729.5          No orders of the defined types were placed in this encounter.    Current Outpatient Medications on File Prior to Visit   Medication Sig Dispense Refill   • albuterol sulfate  (90 Base) MCG/ACT inhaler Inhale 2 puffs.     • cetirizine (zyrTEC) 10 MG tablet TAKE 1 TABLET BY MOUTH EVERY DAY 30 tablet 11   • cyclobenzaprine (FLEXERIL) 5 MG tablet TAKE 1 - 2 TABLETS BY MOUTH UP TO 3 TIMES A DAY AS NEEDED FOR MUSCLE SPASMS 90 tablet 3   • dicyclomine (BENTYL) 20 MG tablet Take 1 tablet by mouth Every 6 (Six) Hours As Needed (abdominal pain, cramping, diarrhea). 30 tablet 0   • docusate sodium (COLACE) 100 MG capsule TAKE 1 CAPSULE BY MOUTH TWICE A DAY 60 capsule 11   • esomeprazole (nexIUM) 40 MG capsule TAKE 1 CAPSULE BY MOUTH EVERY DAY 30 capsule 0   • estradiol (ESTRACE) 1 MG tablet TAKE 1 TABLET BY MOUTH EVERY DAY 30 tablet 11   • fluticasone (Flonase) 50 MCG/ACT nasal spray 2 sprays into the nostril(s) as directed by provider Daily. 16 g 11   • Fremanezumab-vfrm (Ajovy) 225 MG/1.5ML solution auto-injector Inject  1 application under the skin into the appropriate area as directed Every 30 (Thirty) Days. 4 pen 11   • Fremanezumab-vfrm 225 MG/1.5ML solution auto-injector Inject 225 mg under the skin into the appropriate area as directed Every 30 (Thirty) Days. 1.5 mL 11   • hyoscyamine (LEVBID) 0.375 MG 12 hr tablet Take 1 tablet by mouth Every 12 (Twelve) Hours As Needed for Cramping. 60 tablet 5   • linaclotide (LINZESS) 290 MCG capsule capsule Take 1 capsule by mouth Every Morning Before Breakfast. 30 capsule 5   • meclizine (ANTIVERT) 25 MG tablet Take 1 tablet by mouth 3 (Three) Times a Day As Needed for Dizziness (tid prn dizziness). 30 tablet 11   • meloxicam (MOBIC) 7.5 MG tablet TAKE 1 TABLET BY MOUTH EVERY DAY 30 tablet 11   • metoclopramide (REGLAN) 5 MG tablet TAKE 1 TABLET BY MOUTH TWICE A DAY 60 tablet 11   • metroNIDAZOLE (FLAGYL) 500 MG tablet Take 1 tablet by mouth 2 (Two) Times a Day. 14 tablet 0   • ondansetron ODT (ZOFRAN-ODT) 4 MG disintegrating tablet Place 1 tablet on the tongue Every 6 (Six) Hours As Needed for Nausea or Vomiting. 15 tablet 0   • PARoxetine (Paxil) 20 MG tablet Take 1 tablet by mouth Every Morning. 90 tablet 3   • polyethylene glycol (GoLYTELY) 236 g solution Please use the instructions given in office 4000 mL 0   • rizatriptan (Maxalt) 10 MG tablet Take 1 tablet by mouth 1 (One) Time As Needed for Migraine. May repeat in 2 hours if needed 9 tablet 11   • sucralfate (Carafate) 1 GM/10ML suspension Take 10 mL by mouth 4 (Four) Times a Day. 414 mL 0   • TiZANidine (ZANAFLEX) 4 MG capsule Take 1 capsule by mouth 3 (Three) Times a Day As Needed for Muscle Spasms. 90 capsule 0   • topiramate (TOPAMAX) 100 MG tablet Take 1 tablet by mouth 2 (Two) Times a Day. 60 tablet 11     No current facility-administered medications on file prior to visit.       15 minutes   Follow Up   No follow-ups on file.        meds as directed, labs as directed  Labs as directed   No changes for now

## 2022-11-10 ENCOUNTER — TELEPHONE (OUTPATIENT)
Dept: FAMILY MEDICINE CLINIC | Facility: CLINIC | Age: 37
End: 2022-11-10

## 2022-11-10 LAB
25(OH)D3 SERPL-MCNC: 25.1 NG/ML (ref 30–100)
ALBUMIN SERPL-MCNC: 4.3 G/DL (ref 3.5–5.2)
ALBUMIN/GLOB SERPL: 2 G/DL
ALP SERPL-CCNC: 44 U/L (ref 39–117)
ALT SERPL W P-5'-P-CCNC: 27 U/L (ref 1–33)
ANION GAP SERPL CALCULATED.3IONS-SCNC: 14 MMOL/L (ref 5–15)
AST SERPL-CCNC: 25 U/L (ref 1–32)
BASOPHILS # BLD AUTO: 0.06 10*3/MM3 (ref 0–0.2)
BASOPHILS NFR BLD AUTO: 0.9 % (ref 0–1.5)
BILIRUB SERPL-MCNC: 0.2 MG/DL (ref 0–1.2)
BUN SERPL-MCNC: 16 MG/DL (ref 6–20)
BUN/CREAT SERPL: 28.1 (ref 7–25)
CALCIUM SPEC-SCNC: 9.5 MG/DL (ref 8.6–10.5)
CHLORIDE SERPL-SCNC: 102 MMOL/L (ref 98–107)
CHROMATIN AB SERPL-ACNC: <10 IU/ML (ref 0–14)
CO2 SERPL-SCNC: 23 MMOL/L (ref 22–29)
CREAT SERPL-MCNC: 0.57 MG/DL (ref 0.57–1)
DEPRECATED RDW RBC AUTO: 42 FL (ref 37–54)
EGFRCR SERPLBLD CKD-EPI 2021: 121 ML/MIN/1.73
EOSINOPHIL # BLD AUTO: 0.17 10*3/MM3 (ref 0–0.4)
EOSINOPHIL NFR BLD AUTO: 2.5 % (ref 0.3–6.2)
ERYTHROCYTE [DISTWIDTH] IN BLOOD BY AUTOMATED COUNT: 13.3 % (ref 12.3–15.4)
ERYTHROCYTE [SEDIMENTATION RATE] IN BLOOD: 10 MM/HR (ref 0–20)
GLOBULIN UR ELPH-MCNC: 2.2 GM/DL
GLUCOSE SERPL-MCNC: 88 MG/DL (ref 65–99)
HBA1C MFR BLD: 5.5 % (ref 4.8–5.6)
HCT VFR BLD AUTO: 39.2 % (ref 34–46.6)
HGB BLD-MCNC: 13.1 G/DL (ref 12–15.9)
IMM GRANULOCYTES # BLD AUTO: 0.02 10*3/MM3 (ref 0–0.05)
IMM GRANULOCYTES NFR BLD AUTO: 0.3 % (ref 0–0.5)
IRON 24H UR-MRATE: 54 MCG/DL (ref 37–145)
LYMPHOCYTES # BLD AUTO: 1.95 10*3/MM3 (ref 0.7–3.1)
LYMPHOCYTES NFR BLD AUTO: 28.3 % (ref 19.6–45.3)
MAGNESIUM SERPL-MCNC: 2.1 MG/DL (ref 1.6–2.6)
MCH RBC QN AUTO: 29.2 PG (ref 26.6–33)
MCHC RBC AUTO-ENTMCNC: 33.4 G/DL (ref 31.5–35.7)
MCV RBC AUTO: 87.3 FL (ref 79–97)
MONOCYTES # BLD AUTO: 0.5 10*3/MM3 (ref 0.1–0.9)
MONOCYTES NFR BLD AUTO: 7.3 % (ref 5–12)
NEUTROPHILS NFR BLD AUTO: 4.18 10*3/MM3 (ref 1.7–7)
NEUTROPHILS NFR BLD AUTO: 60.7 % (ref 42.7–76)
NRBC BLD AUTO-RTO: 0 /100 WBC (ref 0–0.2)
PLATELET # BLD AUTO: 390 10*3/MM3 (ref 140–450)
PMV BLD AUTO: 10.1 FL (ref 6–12)
POTASSIUM SERPL-SCNC: 3.7 MMOL/L (ref 3.5–5.2)
PROT SERPL-MCNC: 6.5 G/DL (ref 6–8.5)
RBC # BLD AUTO: 4.49 10*6/MM3 (ref 3.77–5.28)
SODIUM SERPL-SCNC: 139 MMOL/L (ref 136–145)
TSH SERPL DL<=0.05 MIU/L-ACNC: 1.22 UIU/ML (ref 0.27–4.2)
VIT B12 BLD-MCNC: 294 PG/ML (ref 211–946)
WBC NRBC COR # BLD: 6.88 10*3/MM3 (ref 3.4–10.8)

## 2022-11-10 NOTE — TELEPHONE ENCOUNTER
Per EDUARDO Mckeon, Ms. Saini has been called with recent lab results and recommendations.   Continue current medications and follow-up as planned or sooner if any problems.     ----- Message from EDUARDO Aguilar sent at 11/10/2022  7:51 AM CST -----  Can you let her know needs vitamin d 2000 iu daily and vitamin b 12 5000mcg daily otherwise normal labs.   ----- Message -----  From: Lab, Background User  Sent: 11/10/2022  12:54 AM CST  To: EDUARDO Aguilar

## 2022-11-11 LAB — ANA SER QL: NEGATIVE

## 2022-11-14 LAB
R RICKETTSI IGG SER QL IA: NEGATIVE
R RICKETTSI IGM SER-ACNC: 0.25 INDEX (ref 0–0.89)

## 2022-11-15 ENCOUNTER — TELEPHONE (OUTPATIENT)
Dept: FAMILY MEDICINE CLINIC | Facility: CLINIC | Age: 37
End: 2022-11-15

## 2022-11-15 NOTE — TELEPHONE ENCOUNTER
Per EDUARDO Mckeon, Ms. Saini has been called with recent lab results & recommendations.  Continue current medications and follow-up as planned or sooner if any problems.       ----- Message from EDUARDO Aguilar sent at 11/15/2022  8:24 AM CST -----  Regarding: FW:  Can you let her know RMSF was negative  ----- Message -----  From: Lab, Background User  Sent: 11/10/2022  12:54 AM CST  To: EDUARDO Aguilar

## 2023-01-28 DIAGNOSIS — Z86.69 HX OF MIGRAINES: Primary | ICD-10-CM

## 2023-01-28 DIAGNOSIS — R53.81 MALAISE: ICD-10-CM

## 2023-01-30 RX ORDER — MELOXICAM 7.5 MG/1
TABLET ORAL
Qty: 30 TABLET | Refills: 5 | Status: SHIPPED | OUTPATIENT
Start: 2023-01-30

## 2023-01-30 RX ORDER — ESTRADIOL 1 MG/1
TABLET ORAL
Qty: 30 TABLET | Refills: 5 | Status: SHIPPED | OUTPATIENT
Start: 2023-01-30

## 2023-01-30 RX ORDER — METOCLOPRAMIDE 5 MG/1
TABLET ORAL
Qty: 60 TABLET | Refills: 5 | Status: SHIPPED | OUTPATIENT
Start: 2023-01-30

## 2023-01-30 RX ORDER — DOCUSATE SODIUM 100 MG
CAPSULE ORAL
Qty: 60 CAPSULE | Refills: 5 | Status: SHIPPED | OUTPATIENT
Start: 2023-01-30

## 2023-04-07 DIAGNOSIS — R53.81 MALAISE: ICD-10-CM

## 2023-04-07 DIAGNOSIS — Z86.69 HX OF MIGRAINES: ICD-10-CM

## 2023-04-07 RX ORDER — CETIRIZINE HYDROCHLORIDE 10 MG/1
TABLET ORAL
Qty: 30 TABLET | Refills: 5 | Status: SHIPPED | OUTPATIENT
Start: 2023-04-07

## 2023-04-11 RX ORDER — HYOSCYAMINE SULFATE EXTENDED-RELEASE 0.38 MG/1
0.38 TABLET ORAL EVERY 12 HOURS PRN
Qty: 60 TABLET | Refills: 5 | Status: SHIPPED | OUTPATIENT
Start: 2023-04-11

## 2023-05-03 ENCOUNTER — LAB (OUTPATIENT)
Dept: LAB | Facility: HOSPITAL | Age: 38
End: 2023-05-03
Payer: COMMERCIAL

## 2023-05-03 ENCOUNTER — OFFICE VISIT (OUTPATIENT)
Dept: FAMILY MEDICINE CLINIC | Facility: CLINIC | Age: 38
End: 2023-05-03
Payer: COMMERCIAL

## 2023-05-03 VITALS
HEART RATE: 79 BPM | WEIGHT: 153 LBS | OXYGEN SATURATION: 100 % | SYSTOLIC BLOOD PRESSURE: 120 MMHG | HEIGHT: 55 IN | DIASTOLIC BLOOD PRESSURE: 88 MMHG | BODY MASS INDEX: 35.41 KG/M2

## 2023-05-03 DIAGNOSIS — R25.2 SPASM: ICD-10-CM

## 2023-05-03 DIAGNOSIS — R53.81 MALAISE AND FATIGUE: ICD-10-CM

## 2023-05-03 DIAGNOSIS — M54.2 NECK PAIN: ICD-10-CM

## 2023-05-03 DIAGNOSIS — R61 NIGHT SWEATS: Primary | ICD-10-CM

## 2023-05-03 DIAGNOSIS — R53.83 MALAISE AND FATIGUE: ICD-10-CM

## 2023-05-03 LAB
25(OH)D3 SERPL-MCNC: 17.8 NG/ML (ref 30–100)
ALBUMIN SERPL-MCNC: 4.1 G/DL (ref 3.5–5.2)
ALBUMIN/GLOB SERPL: 1.5 G/DL
ALP SERPL-CCNC: 60 U/L (ref 39–117)
ALT SERPL W P-5'-P-CCNC: 17 U/L (ref 1–33)
ANION GAP SERPL CALCULATED.3IONS-SCNC: 13.2 MMOL/L (ref 5–15)
AST SERPL-CCNC: 14 U/L (ref 1–32)
BILIRUB SERPL-MCNC: <0.2 MG/DL (ref 0–1.2)
BUN SERPL-MCNC: 13 MG/DL (ref 6–20)
BUN/CREAT SERPL: 27.1 (ref 7–25)
CALCIUM SPEC-SCNC: 9.2 MG/DL (ref 8.6–10.5)
CHLORIDE SERPL-SCNC: 100 MMOL/L (ref 98–107)
CO2 SERPL-SCNC: 23.8 MMOL/L (ref 22–29)
CREAT SERPL-MCNC: 0.48 MG/DL (ref 0.57–1)
EGFRCR SERPLBLD CKD-EPI 2021: 125.3 ML/MIN/1.73
GLOBULIN UR ELPH-MCNC: 2.7 GM/DL
GLUCOSE SERPL-MCNC: 79 MG/DL (ref 65–99)
IRON 24H UR-MRATE: 49 MCG/DL (ref 37–145)
POTASSIUM SERPL-SCNC: 4.3 MMOL/L (ref 3.5–5.2)
PROT SERPL-MCNC: 6.8 G/DL (ref 6–8.5)
SODIUM SERPL-SCNC: 137 MMOL/L (ref 136–145)
TSH SERPL DL<=0.05 MIU/L-ACNC: 1.45 UIU/ML (ref 0.27–4.2)
VIT B12 BLD-MCNC: 412 PG/ML (ref 211–946)

## 2023-05-03 PROCEDURE — 82306 VITAMIN D 25 HYDROXY: CPT | Performed by: NURSE PRACTITIONER

## 2023-05-03 PROCEDURE — 1160F RVW MEDS BY RX/DR IN RCRD: CPT | Performed by: NURSE PRACTITIONER

## 2023-05-03 PROCEDURE — 36415 COLL VENOUS BLD VENIPUNCTURE: CPT | Performed by: NURSE PRACTITIONER

## 2023-05-03 PROCEDURE — 83036 HEMOGLOBIN GLYCOSYLATED A1C: CPT | Performed by: NURSE PRACTITIONER

## 2023-05-03 PROCEDURE — 84443 ASSAY THYROID STIM HORMONE: CPT | Performed by: NURSE PRACTITIONER

## 2023-05-03 PROCEDURE — 80053 COMPREHEN METABOLIC PANEL: CPT | Performed by: NURSE PRACTITIONER

## 2023-05-03 PROCEDURE — 1159F MED LIST DOCD IN RCRD: CPT | Performed by: NURSE PRACTITIONER

## 2023-05-03 PROCEDURE — 82607 VITAMIN B-12: CPT | Performed by: NURSE PRACTITIONER

## 2023-05-03 PROCEDURE — 83540 ASSAY OF IRON: CPT | Performed by: NURSE PRACTITIONER

## 2023-05-03 PROCEDURE — 85025 COMPLETE CBC W/AUTO DIFF WBC: CPT | Performed by: NURSE PRACTITIONER

## 2023-05-03 RX ORDER — BACLOFEN 10 MG/1
10 TABLET ORAL 3 TIMES DAILY
Qty: 90 TABLET | Refills: 3 | Status: SHIPPED | OUTPATIENT
Start: 2023-05-03

## 2023-05-03 RX ORDER — METHYLPREDNISOLONE 4 MG/1
TABLET ORAL
Qty: 21 TABLET | Refills: 0 | Status: SHIPPED | OUTPATIENT
Start: 2023-05-03

## 2023-05-03 RX ORDER — MELOXICAM 15 MG/1
15 TABLET ORAL DAILY
Qty: 90 TABLET | Refills: 3 | Status: SHIPPED | OUTPATIENT
Start: 2023-05-03

## 2023-05-03 NOTE — PROGRESS NOTES
Chief Complaint   Patient presents with   • Night Sweats   • Shoulder Pain   • Back Pain     Subjective   Elliebhavik Saini is a 37 y.o. female.           Night Sweats  This is a new problem. The current episode started more than 1 month ago. The problem occurs daily. The problem has been gradually worsening. Associated symptoms include arthralgias, fatigue, joint swelling and myalgias. Pertinent negatives include no abdominal pain, chest pain, chills, congestion, coughing, diaphoresis, fever, headaches, neck pain, numbness, rash, sore throat, vomiting or weakness. Treatments tried: night sweats  The treatment provided no relief.   Back Pain  This is a recurrent problem. The current episode started more than 1 month ago. The problem occurs intermittently. The problem has been rapidly worsening since onset. The pain is at a severity of 3/10. The pain is the same all the time. Stiffness is present all day. Associated symptoms include paresis and paresthesias. Pertinent negatives include no abdominal pain, chest pain, dysuria, fever, headaches, numbness or weakness. She has tried analgesics, ice and NSAIDs for the symptoms. The treatment provided mild relief.        The following portions of the patient's history were reviewed and updated as appropriate: allergies, current medications, past social history and problem list.    Review of Systems   Constitutional: Positive for activity change, fatigue, night sweats and unexpected weight change. Negative for appetite change, chills, diaphoresis and fever.   HENT: Negative.  Negative for congestion, dental problem, drooling, ear discharge, ear pain, facial swelling, hearing loss, mouth sores, nosebleeds, postnasal drip, rhinorrhea, sinus pressure, sinus pain, sneezing, sore throat, tinnitus, trouble swallowing and voice change.    Eyes: Negative.  Negative for photophobia, pain, discharge, redness, itching and visual disturbance.   Respiratory: Negative.  Negative for  "apnea, cough, choking, chest tightness, shortness of breath, wheezing and stridor.    Cardiovascular: Negative.  Negative for chest pain, palpitations and leg swelling.   Gastrointestinal: Negative.  Negative for abdominal distention, abdominal pain, anal bleeding, blood in stool, constipation, diarrhea, rectal pain and vomiting.   Endocrine: Negative.  Negative for cold intolerance, heat intolerance, polydipsia, polyphagia and polyuria.   Genitourinary: Negative.  Negative for difficulty urinating, dyspareunia and dysuria.   Musculoskeletal: Positive for arthralgias, back pain, gait problem, joint swelling and myalgias. Negative for neck pain and neck stiffness.        Upper back pain    Skin: Negative.  Negative for color change, pallor, rash and wound.   Allergic/Immunologic: Negative.  Negative for environmental allergies, food allergies and immunocompromised state.   Neurological: Positive for paresthesias. Negative for dizziness, tremors, seizures, syncope, facial asymmetry, speech difficulty, weakness, light-headedness, numbness and headaches.   Hematological: Negative.  Negative for adenopathy. Does not bruise/bleed easily.   Psychiatric/Behavioral: Positive for sleep disturbance. Negative for agitation, behavioral problems, confusion, decreased concentration, dysphoric mood, hallucinations, self-injury and suicidal ideas. The patient is nervous/anxious. The patient is not hyperactive.        Objective   /88   Pulse 79   Ht 139.7 cm (55\")   Wt 69.4 kg (153 lb)   SpO2 100%   BMI 35.56 kg/m²   Physical Exam  Vitals and nursing note reviewed.   Constitutional:       General: She is not in acute distress.     Appearance: Normal appearance. She is not ill-appearing, toxic-appearing or diaphoretic.   HENT:      Head: Normocephalic and atraumatic.      Right Ear: Tympanic membrane and ear canal normal. There is no impacted cerumen.      Left Ear: Tympanic membrane normal. There is no impacted cerumen.    "   Nose: Nose normal. No congestion or rhinorrhea.      Mouth/Throat:      Mouth: Mucous membranes are dry.      Pharynx: No oropharyngeal exudate or posterior oropharyngeal erythema.   Eyes:      General: No scleral icterus.        Right eye: No discharge.         Left eye: No discharge.      Pupils: Pupils are equal, round, and reactive to light.   Neck:      Vascular: No carotid bruit.   Cardiovascular:      Rate and Rhythm: Normal rate and regular rhythm.      Pulses: Normal pulses.      Heart sounds: Normal heart sounds. No murmur heard.    No friction rub. No gallop.   Pulmonary:      Effort: Pulmonary effort is normal. No respiratory distress.      Breath sounds: Normal breath sounds. No stridor. No wheezing, rhonchi or rales.   Chest:      Chest wall: No tenderness.   Abdominal:      General: Abdomen is flat. Bowel sounds are normal. There is no distension.      Palpations: Abdomen is soft. There is no mass.      Tenderness: There is no abdominal tenderness. There is no right CVA tenderness, left CVA tenderness, guarding or rebound.      Hernia: No hernia is present.   Musculoskeletal:         General: Tenderness present. No swelling, deformity or signs of injury. Normal range of motion.      Cervical back: Normal range of motion. Spasms, tenderness and bony tenderness present. No rigidity.        Back:       Right lower leg: No edema.      Left lower leg: No edema.      Comments: Muscle pain    Lymphadenopathy:      Cervical: No cervical adenopathy.   Skin:     General: Skin is warm and dry.      Coloration: Skin is not jaundiced or pale.      Findings: No bruising, erythema, lesion or rash.   Neurological:      General: No focal deficit present.      Mental Status: She is alert and oriented to person, place, and time. Mental status is at baseline.      Cranial Nerves: No cranial nerve deficit.      Sensory: No sensory deficit.      Motor: No weakness.      Coordination: Coordination normal.      Gait: Gait  normal.      Deep Tendon Reflexes: Reflexes normal.   Psychiatric:         Mood and Affect: Mood normal.         Behavior: Behavior normal.              Assessment & Plan     Problems Addressed this Visit        Musculoskeletal and Injuries    Neck pain   Other Visit Diagnoses     Night sweats    -  Primary    Relevant Orders    CBC & Differential    Comprehensive Metabolic Panel    Hemoglobin A1c    Iron    Vitamin D,25-Hydroxy    Vitamin B12    TSH    Malaise and fatigue        Relevant Orders    CBC & Differential    Comprehensive Metabolic Panel    Hemoglobin A1c    Iron    Vitamin D,25-Hydroxy    Vitamin B12    TSH    Spasm          Diagnoses       Codes Comments    Night sweats    -  Primary ICD-10-CM: R61  ICD-9-CM: 780.8     Malaise and fatigue     ICD-10-CM: R53.81, R53.83  ICD-9-CM: 780.79     Neck pain     ICD-10-CM: M54.2  ICD-9-CM: 723.1     Spasm     ICD-10-CM: R25.2  ICD-9-CM: 781.0            New Medications Ordered This Visit   Medications   • estrogens, conjugated, (Premarin) 0.625 MG tablet     Sig: Take 1 tablet by mouth Daily. Daily     Dispense:  90 tablet     Refill:  3   • methylPREDNISolone (MEDROL) 4 MG dose pack     Sig: Take as directed on package instructions.     Dispense:  21 tablet     Refill:  0   • baclofen (LIORESAL) 10 MG tablet     Sig: Take 1 tablet by mouth 3 (Three) Times a Day.     Dispense:  90 tablet     Refill:  3   • meloxicam (Mobic) 15 MG tablet     Sig: Take 1 tablet by mouth Daily.     Dispense:  90 tablet     Refill:  3     Current Outpatient Medications on File Prior to Visit   Medication Sig Dispense Refill   • albuterol sulfate  (90 Base) MCG/ACT inhaler Inhale 2 puffs.     • cetirizine (zyrTEC) 10 MG tablet TAKE 1 TABLET BY MOUTH EVERY DAY 30 tablet 5   • [employer] Stool Softener 100 MG capsule TAKE 1 CAPSULE BY MOUTH TWICE A DAY 60 capsule 5   • dicyclomine (BENTYL) 20 MG tablet Take 1 tablet by mouth Every 6 (Six) Hours As Needed (abdominal pain, cramping,  diarrhea). 30 tablet 0   • esomeprazole (nexIUM) 40 MG capsule TAKE 1 CAPSULE BY MOUTH EVERY DAY 30 capsule 0   • estradiol (ESTRACE) 1 MG tablet TAKE 1 TABLET BY MOUTH EVERY DAY 30 tablet 5   • fluticasone (Flonase) 50 MCG/ACT nasal spray 2 sprays into the nostril(s) as directed by provider Daily. 16 g 11   • Fremanezumab-vfrm (Ajovy) 225 MG/1.5ML solution auto-injector Inject 1 application under the skin into the appropriate area as directed Every 30 (Thirty) Days. 4 pen 11   • hyoscyamine (LEVBID) 0.375 MG 12 hr tablet TAKE 1 TABLET BY MOUTH EVERY 12 (TWELVE) HOURS AS NEEDED FOR CRAMPING. 60 tablet 5   • meclizine (ANTIVERT) 25 MG tablet Take 1 tablet by mouth 3 (Three) Times a Day As Needed for Dizziness (tid prn dizziness). 30 tablet 11   • metoclopramide (REGLAN) 5 MG tablet TAKE 1 TABLET BY MOUTH TWICE A DAY 60 tablet 5   • ondansetron ODT (ZOFRAN-ODT) 4 MG disintegrating tablet Place 1 tablet on the tongue Every 6 (Six) Hours As Needed for Nausea or Vomiting. 15 tablet 0   • PARoxetine (Paxil) 20 MG tablet Take 1 tablet by mouth Every Morning. 90 tablet 3   • rizatriptan (Maxalt) 10 MG tablet Take 1 tablet by mouth 1 (One) Time As Needed for Migraine. May repeat in 2 hours if needed 9 tablet 11   • TiZANidine (ZANAFLEX) 4 MG capsule Take 1 capsule by mouth 3 (Three) Times a Day As Needed for Muscle Spasms. 90 capsule 0   • topiramate (TOPAMAX) 100 MG tablet Take 1 tablet by mouth 2 (Two) Times a Day. 60 tablet 11   • [DISCONTINUED] cyclobenzaprine (FLEXERIL) 5 MG tablet TAKE 1 - 2 TABLETS BY MOUTH UP TO 3 TIMES A DAY AS NEEDED FOR MUSCLE SPASMS 90 tablet 3   • [DISCONTINUED] Fremanezumab-vfrm 225 MG/1.5ML solution auto-injector Inject 225 mg under the skin into the appropriate area as directed Every 30 (Thirty) Days. 1.5 mL 11   • [DISCONTINUED] linaclotide (LINZESS) 290 MCG capsule capsule Take 1 capsule by mouth Every Morning Before Breakfast. 30 capsule 5   • [DISCONTINUED] meloxicam (MOBIC) 7.5 MG tablet  TAKE 1 TABLET BY MOUTH EVERY DAY 30 tablet 5   • [DISCONTINUED] metroNIDAZOLE (FLAGYL) 500 MG tablet Take 1 tablet by mouth 2 (Two) Times a Day. 14 tablet 0   • [DISCONTINUED] polyethylene glycol (GoLYTELY) 236 g solution Please use the instructions given in office 4000 mL 0   • [DISCONTINUED] sucralfate (Carafate) 1 GM/10ML suspension Take 10 mL by mouth 4 (Four) Times a Day. 414 mL 0     No current facility-administered medications on file prior to visit.       20 minutes   Follow Up   Return in about 6 months (around 11/3/2023).     Labs as directed    Labs as directed  Add premarin as directed     Add baclofen, mobic, medrol dose pack as directed     See back in 6 months

## 2023-05-04 ENCOUNTER — TELEPHONE (OUTPATIENT)
Dept: FAMILY MEDICINE CLINIC | Facility: CLINIC | Age: 38
End: 2023-05-04
Payer: COMMERCIAL

## 2023-05-04 LAB
BASOPHILS # BLD AUTO: 0.06 10*3/MM3 (ref 0–0.2)
BASOPHILS NFR BLD AUTO: 0.7 % (ref 0–1.5)
DEPRECATED RDW RBC AUTO: 40.5 FL (ref 37–54)
EOSINOPHIL # BLD AUTO: 0.22 10*3/MM3 (ref 0–0.4)
EOSINOPHIL NFR BLD AUTO: 2.5 % (ref 0.3–6.2)
ERYTHROCYTE [DISTWIDTH] IN BLOOD BY AUTOMATED COUNT: 13.1 % (ref 12.3–15.4)
HBA1C MFR BLD: 5.5 % (ref 4.8–5.6)
HCT VFR BLD AUTO: 39.6 % (ref 34–46.6)
HGB BLD-MCNC: 13.6 G/DL (ref 12–15.9)
IMM GRANULOCYTES # BLD AUTO: 0.03 10*3/MM3 (ref 0–0.05)
IMM GRANULOCYTES NFR BLD AUTO: 0.3 % (ref 0–0.5)
LYMPHOCYTES # BLD AUTO: 2.33 10*3/MM3 (ref 0.7–3.1)
LYMPHOCYTES NFR BLD AUTO: 26 % (ref 19.6–45.3)
MCH RBC QN AUTO: 29.2 PG (ref 26.6–33)
MCHC RBC AUTO-ENTMCNC: 34.3 G/DL (ref 31.5–35.7)
MCV RBC AUTO: 85.2 FL (ref 79–97)
MONOCYTES # BLD AUTO: 0.54 10*3/MM3 (ref 0.1–0.9)
MONOCYTES NFR BLD AUTO: 6 % (ref 5–12)
NEUTROPHILS NFR BLD AUTO: 5.79 10*3/MM3 (ref 1.7–7)
NEUTROPHILS NFR BLD AUTO: 64.5 % (ref 42.7–76)
NRBC BLD AUTO-RTO: 0 /100 WBC (ref 0–0.2)
PLATELET # BLD AUTO: 377 10*3/MM3 (ref 140–450)
PMV BLD AUTO: 10.4 FL (ref 6–12)
RBC # BLD AUTO: 4.65 10*6/MM3 (ref 3.77–5.28)
WBC NRBC COR # BLD: 8.97 10*3/MM3 (ref 3.4–10.8)

## 2023-05-04 NOTE — TELEPHONE ENCOUNTER
Per EDUARDO Mckeon, Ms. Saini has been called with recent lab results & recommendations.  Continue current medications and follow-up as planned or sooner if any problems.      She states she has been taking Vitamin D since November, She feels it's the 2000 IU daily  Does she need to do anything else?    ----- Message from EDUARDO Aguilar sent at 5/4/2023  7:44 AM CDT -----  Can you let her know she needs vitamin d 2000 iu daily otherwise good results

## 2023-05-04 NOTE — PROGRESS NOTES
Per EDUARDO Mckeon, Ms. Saini has been called with recent lab results & recommendations.  Continue current medications and follow-up as planned or sooner if any problems.     She states she has been taking Vitamin D since November, She feels it's the 2000 IU daily  Does she need to do anything else?

## 2023-05-05 ENCOUNTER — TELEPHONE (OUTPATIENT)
Dept: FAMILY MEDICINE CLINIC | Facility: CLINIC | Age: 38
End: 2023-05-05
Payer: COMMERCIAL

## 2023-05-05 RX ORDER — ERGOCALCIFEROL 1.25 MG/1
50000 CAPSULE ORAL WEEKLY
Qty: 4 CAPSULE | Refills: 5 | Status: SHIPPED | OUTPATIENT
Start: 2023-05-05

## 2023-05-05 NOTE — PROGRESS NOTES
Per EDUARDO Mckeon, Ms. Saini has been called with new medication recommendations.  Continue current medications and follow-up as planned or sooner if any problems.     Rx sent to [facility] in [place]

## 2023-05-05 NOTE — TELEPHONE ENCOUNTER
Per EDUARDO Mckeon, Ms. Saini has been called with new medication recommendations.  Continue current medications and follow-up as planned or sooner if any problems.     Rx sent to [facility] in [place]      ----- Message from EDUARDO Aguilar sent at 5/5/2023  7:45 AM CDT -----  We can add [id number] iu weekly rx   ----- Message -----  From: Maribel Leon LPN  Sent: 5/4/2023   2:23 PM CDT  To: EDUARDO Aguilar    See Note

## 2023-07-25 ENCOUNTER — TELEPHONE (OUTPATIENT)
Dept: FAMILY MEDICINE CLINIC | Facility: CLINIC | Age: 38
End: 2023-07-25
Payer: COMMERCIAL

## 2023-07-25 NOTE — TELEPHONE ENCOUNTER
Patient called regarding Ubrelvy is not wanting to approve it either for her migraines which Linda was trying to get approved so needs to know what to do as she is still having migraines .    Insurance says she has not tried 2 or more different things but she says that she has tried several . Pt has already used all that she has .  Pt is aware Linda will be back tomorrow     [facility]   Patient [phone]

## 2023-07-27 ENCOUNTER — TELEPHONE (OUTPATIENT)
Dept: FAMILY MEDICINE CLINIC | Facility: CLINIC | Age: 38
End: 2023-07-27
Payer: COMMERCIAL

## 2023-07-27 RX ORDER — RIMEGEPANT SULFATE 75 MG/75MG
75 TABLET, ORALLY DISINTEGRATING ORAL DAILY PRN
Qty: 8 TABLET | Refills: 5 | Status: SHIPPED | OUTPATIENT
Start: 2023-07-27

## 2023-07-27 NOTE — TELEPHONE ENCOUNTER
Ms. Saini has called again concerning the medication Ubrelvy is not wanting to approve.  Waning to see what Linda has found out.    Call back [phone]

## 2023-07-31 ENCOUNTER — TELEPHONE (OUTPATIENT)
Dept: FAMILY MEDICINE CLINIC | Facility: CLINIC | Age: 38
End: 2023-07-31
Payer: COMMERCIAL

## 2023-08-03 ENCOUNTER — TELEPHONE (OUTPATIENT)
Dept: FAMILY MEDICINE CLINIC | Facility: CLINIC | Age: 38
End: 2023-08-03
Payer: COMMERCIAL

## 2023-08-03 RX ORDER — RIMEGEPANT SULFATE 75 MG/75MG
75 TABLET, ORALLY DISINTEGRATING ORAL DAILY PRN
Qty: 18 TABLET | Refills: 5 | Status: SHIPPED | OUTPATIENT
Start: 2023-08-03 | End: 2023-08-04 | Stop reason: RX

## 2023-08-04 DIAGNOSIS — G43.701 CHRONIC MIGRAINE WITHOUT AURA WITH STATUS MIGRAINOSUS, NOT INTRACTABLE: Primary | ICD-10-CM

## 2023-08-04 RX ORDER — ZOLMITRIPTAN 5 MG/1
TABLET, FILM COATED ORAL
Qty: 6 TABLET | Refills: 5 | Status: SHIPPED | OUTPATIENT
Start: 2023-08-04

## 2023-08-10 ENCOUNTER — OFFICE VISIT (OUTPATIENT)
Dept: FAMILY MEDICINE CLINIC | Facility: CLINIC | Age: 38
End: 2023-08-10
Payer: COMMERCIAL

## 2023-08-10 VITALS
HEART RATE: 95 BPM | WEIGHT: 171.3 LBS | DIASTOLIC BLOOD PRESSURE: 78 MMHG | RESPIRATION RATE: 16 BRPM | SYSTOLIC BLOOD PRESSURE: 118 MMHG | TEMPERATURE: 98.1 F | OXYGEN SATURATION: 97 % | HEIGHT: 55 IN | BODY MASS INDEX: 39.64 KG/M2

## 2023-08-10 DIAGNOSIS — R53.81 MALAISE AND FATIGUE: Primary | ICD-10-CM

## 2023-08-10 DIAGNOSIS — R53.83 MALAISE AND FATIGUE: Primary | ICD-10-CM

## 2023-08-10 DIAGNOSIS — F41.9 ANXIETY: ICD-10-CM

## 2023-08-10 DIAGNOSIS — E66.01 CLASS 2 SEVERE OBESITY WITH SERIOUS COMORBIDITY AND BODY MASS INDEX (BMI) OF 39.0 TO 39.9 IN ADULT, UNSPECIFIED OBESITY TYPE: ICD-10-CM

## 2023-08-10 PROCEDURE — 99213 OFFICE O/P EST LOW 20 MIN: CPT | Performed by: NURSE PRACTITIONER

## 2023-08-10 PROCEDURE — 1159F MED LIST DOCD IN RCRD: CPT | Performed by: NURSE PRACTITIONER

## 2023-08-10 PROCEDURE — 1160F RVW MEDS BY RX/DR IN RCRD: CPT | Performed by: NURSE PRACTITIONER

## 2023-08-10 RX ORDER — PAROXETINE 10 MG/1
10 TABLET, FILM COATED ORAL EVERY MORNING
Qty: 30 TABLET | Refills: 11 | Status: SHIPPED | OUTPATIENT
Start: 2023-08-10

## 2023-08-10 NOTE — PROGRESS NOTES
Chief Complaint   Patient presents with    Follow-up    Weight Loss     Subjective   Ellie Saini is a 37 y.o. female.           Obesity  This is a chronic problem. The current episode started more than 1 month ago. The problem occurs daily. The problem has been gradually worsening. Associated symptoms include fatigue. Pertinent negatives include no chills, congestion, coughing or diaphoresis. The treatment provided mild relief.   Fatigue  This is a recurrent problem. The current episode started more than 1 year ago. The problem occurs daily. The problem has been waxing and waning. Associated symptoms include fatigue. Pertinent negatives include no chills, congestion, coughing or diaphoresis. She has tried rest and relaxation for the symptoms. The treatment provided mild relief.   Anxiety  Presents for follow-up visit. Symptoms include decreased concentration, depressed mood, excessive worry, nervous/anxious behavior and obsessions. Patient reports no shortness of breath. Symptoms occur most days.          The following portions of the patient's history were reviewed and updated as appropriate: allergies, current medications, past social history and problem list.    Review of Systems   Constitutional:  Positive for fatigue. Negative for chills and diaphoresis.   HENT:  Negative for congestion and dental problem.    Eyes:  Negative for photophobia, pain, discharge, redness, itching and visual disturbance.   Respiratory: Negative.  Negative for apnea, cough, choking, chest tightness, shortness of breath and wheezing.    Cardiovascular: Negative.    Endocrine: Negative.    Genitourinary: Negative.    Allergic/Immunologic: Negative.    Hematological: Negative.    Psychiatric/Behavioral:  Positive for decreased concentration. The patient is nervous/anxious.         Anxiety improved -wanting to decrease dosage of paxil to see if helps with weight loss      Objective   /78 (BP Location: Left arm, Patient  "Position: Sitting, Cuff Size: Adult)   Pulse 95   Temp 98.1 øF (36.7 øC)   Resp 16   Ht 139.7 cm (55\")   Wt 77.7 kg (171 lb 4.8 oz)   SpO2 97%   BMI 39.81 kg/mý   Physical Exam  Vitals and nursing note reviewed.   Constitutional:       General: She is not in acute distress.     Appearance: Normal appearance. She is not ill-appearing, toxic-appearing or diaphoretic.   HENT:      Head: Normocephalic and atraumatic.      Comments: Fluid bilateral tm's      Right Ear: Tympanic membrane and ear canal normal. There is no impacted cerumen.      Left Ear: Tympanic membrane normal. There is no impacted cerumen.      Nose: Nose normal. No congestion or rhinorrhea.      Mouth/Throat:      Mouth: Mucous membranes are dry.      Pharynx: No oropharyngeal exudate or posterior oropharyngeal erythema.   Eyes:      General: No scleral icterus.        Right eye: No discharge.         Left eye: No discharge.      Pupils: Pupils are equal, round, and reactive to light.   Neck:      Vascular: No carotid bruit.   Cardiovascular:      Rate and Rhythm: Normal rate and regular rhythm.      Pulses: Normal pulses.      Heart sounds: Normal heart sounds. No murmur heard.    No friction rub. No gallop.   Pulmonary:      Effort: Pulmonary effort is normal. No respiratory distress.      Breath sounds: No stridor. No wheezing, rhonchi or rales.   Chest:      Chest wall: No tenderness.   Abdominal:      General: Abdomen is flat. Bowel sounds are normal. There is no distension.      Palpations: Abdomen is soft. There is no mass.      Tenderness: There is no abdominal tenderness. There is no right CVA tenderness, left CVA tenderness, guarding or rebound.      Hernia: No hernia is present.   Musculoskeletal:         General: No swelling, deformity or signs of injury. Normal range of motion.      Cervical back: Normal range of motion. No rigidity or tenderness.      Right lower leg: No edema.      Left lower leg: No edema.   Lymphadenopathy:      " Cervical: No cervical adenopathy.   Skin:     General: Skin is warm and dry.      Coloration: Skin is not jaundiced or pale.      Findings: No bruising, erythema, lesion or rash.   Neurological:      General: No focal deficit present.      Mental Status: She is alert and oriented to person, place, and time. Mental status is at baseline.      Cranial Nerves: No cranial nerve deficit.      Sensory: No sensory deficit.      Motor: No weakness.      Coordination: Coordination normal.      Gait: Gait normal.      Deep Tendon Reflexes: Reflexes normal.   Psychiatric:         Mood and Affect: Mood normal.         Behavior: Behavior normal.            Assessment & Plan     Problems Addressed this Visit    None  Visit Diagnoses       Malaise and fatigue    -  Primary    Class 2 severe obesity with serious comorbidity and body mass index (BMI) of 39.0 to 39.9 in adult, unspecified obesity type              Diagnoses         Codes Comments    Malaise and fatigue    -  Primary ICD-10-CM: R53.81, R53.83  ICD-9-CM: 780.79     Class 2 severe obesity with serious comorbidity and body mass index (BMI) of 39.0 to 39.9 in adult, unspecified obesity type     ICD-10-CM: E66.01, Z68.39  ICD-9-CM: 278.01, V85.39              New Medications Ordered This Visit   Medications    PARoxetine (Paxil) 10 MG tablet     Sig: Take 1 tablet by mouth Every Morning.     Dispense:  30 tablet     Refill:  11    metFORMIN (Glucophage) 500 MG tablet     Sig: Take 1 tablet by mouth Daily With Breakfast.     Dispense:  30 tablet     Refill:  11     Current Outpatient Medications on File Prior to Visit   Medication Sig Dispense Refill    albuterol sulfate  (90 Base) MCG/ACT inhaler Inhale 2 puffs.      baclofen (LIORESAL) 10 MG tablet Take 1 tablet by mouth 3 (Three) Times a Day. 90 tablet 3    cetirizine (zyrTEC) 10 MG tablet TAKE 1 TABLET BY MOUTH EVERY DAY 30 tablet 5    [facility] Stool Softener 100 MG capsule TAKE 1 CAPSULE BY MOUTH TWICE A DAY 60  capsule 5    dicyclomine (BENTYL) 20 MG tablet Take 1 tablet by mouth Every 6 (Six) Hours As Needed (abdominal pain, cramping, diarrhea). 30 tablet 0    esomeprazole (nexIUM) 40 MG capsule TAKE 1 CAPSULE BY MOUTH EVERY DAY 30 capsule 0    estrogens, conjugated, (Premarin) 0.625 MG tablet Take 1 tablet by mouth Daily. Daily 90 tablet 3    fluticasone (Flonase) 50 MCG/ACT nasal spray 2 sprays into the nostril(s) as directed by provider Daily. 16 g 11    Fremanezumab-vfrm (Ajovy) 225 MG/1.5ML solution auto-injector Inject 1 application under the skin into the appropriate area as directed Every 30 (Thirty) Days. 4 pen 11    hyoscyamine (LEVBID) 0.375 MG 12 hr tablet TAKE 1 TABLET BY MOUTH EVERY 12 (TWELVE) HOURS AS NEEDED FOR CRAMPING. 60 tablet 5    meclizine (ANTIVERT) 25 MG tablet Take 1 tablet by mouth 3 (Three) Times a Day As Needed for Dizziness (tid prn dizziness). 30 tablet 11    meloxicam (Mobic) 15 MG tablet Take 1 tablet by mouth Daily. 90 tablet 3    metoclopramide (REGLAN) 5 MG tablet TAKE 1 TABLET BY MOUTH TWICE A DAY 60 tablet 5    ondansetron ODT (ZOFRAN-ODT) 4 MG disintegrating tablet Place 1 tablet on the tongue Every 6 (Six) Hours As Needed for Nausea or Vomiting. 15 tablet 0    tiZANidine (ZANAFLEX) 4 MG tablet Take 1 tablet by mouth At Night As Needed for Muscle Spasms. 30 tablet 5    vitamin D (ERGOCALCIFEROL) 1.25 MG (26092 UT) capsule capsule Take 1 capsule by mouth 1 (One) Time Per Week. 4 capsule 5    ZOLMitriptan (Zomig) 5 MG tablet Take 1 tablet by mouth at onset of migraine may repeat in 2 hours not to exceed 2 tablets in a 24 hour period 6 tablet 5    [DISCONTINUED] PARoxetine (PAXIL) 20 MG tablet TAKE 1 TABLET BY MOUTH EVERY DAY IN THE MORNING 90 tablet 3    [DISCONTINUED] phentermine 15 MG capsule Take 1 capsule by mouth Every Morning. 30 capsule 0     No current facility-administered medications on file prior to visit.       20 ashley   Follow Up   Return in about 3 months (around  11/10/2023).       Meds as directed, diet discussed  See back in 4 weeks as directed, patient agrees     Decrease paxil and add metformin to see if  helps with weight loss  Diet discussed  Bring in diet diary with next visit   No processed foods, no fast foods, increase water

## 2023-08-22 ENCOUNTER — TELEPHONE (OUTPATIENT)
Dept: FAMILY MEDICINE CLINIC | Facility: CLINIC | Age: 38
End: 2023-08-22
Payer: COMMERCIAL

## 2023-08-22 DIAGNOSIS — R53.81 MALAISE: ICD-10-CM

## 2023-08-22 DIAGNOSIS — Z86.69 HX OF MIGRAINES: ICD-10-CM

## 2023-08-22 RX ORDER — FREMANEZUMAB-VFRM 225 MG/1.5ML
1.5 INJECTION SUBCUTANEOUS
Qty: 1.5 ML | Refills: 11 | Status: SHIPPED | OUTPATIENT
Start: 2023-08-22

## 2023-08-22 RX ORDER — RIMEGEPANT SULFATE 75 MG/75MG
75 TABLET, ORALLY DISINTEGRATING ORAL AS NEEDED
Qty: 8 TABLET | Refills: 11 | Status: SHIPPED | OUTPATIENT
Start: 2023-08-22

## 2023-08-30 RX ORDER — BACLOFEN 10 MG/1
10 TABLET ORAL 3 TIMES DAILY
Qty: 90 TABLET | Refills: 3 | Status: SHIPPED | OUTPATIENT
Start: 2023-08-30

## 2023-09-11 ENCOUNTER — OFFICE VISIT (OUTPATIENT)
Dept: FAMILY MEDICINE CLINIC | Facility: CLINIC | Age: 38
End: 2023-09-11
Payer: COMMERCIAL

## 2023-09-11 VITALS
HEART RATE: 109 BPM | SYSTOLIC BLOOD PRESSURE: 112 MMHG | BODY MASS INDEX: 40.04 KG/M2 | OXYGEN SATURATION: 98 % | WEIGHT: 173 LBS | HEIGHT: 55 IN | DIASTOLIC BLOOD PRESSURE: 80 MMHG

## 2023-09-11 DIAGNOSIS — R53.83 MALAISE AND FATIGUE: ICD-10-CM

## 2023-09-11 DIAGNOSIS — R53.81 MALAISE AND FATIGUE: ICD-10-CM

## 2023-09-11 DIAGNOSIS — E66.01 CLASS 3 SEVERE OBESITY WITH SERIOUS COMORBIDITY AND BODY MASS INDEX (BMI) OF 40.0 TO 44.9 IN ADULT, UNSPECIFIED OBESITY TYPE: ICD-10-CM

## 2023-09-11 DIAGNOSIS — Z86.69 HX OF MIGRAINES: Primary | ICD-10-CM

## 2023-09-11 DIAGNOSIS — F41.9 ANXIETY: ICD-10-CM

## 2023-09-11 DIAGNOSIS — F51.01 PRIMARY INSOMNIA: ICD-10-CM

## 2023-09-11 PROCEDURE — 99213 OFFICE O/P EST LOW 20 MIN: CPT | Performed by: NURSE PRACTITIONER

## 2023-09-11 RX ORDER — TOPIRAMATE 50 MG/1
TABLET, FILM COATED ORAL
Qty: 90 TABLET | Refills: 3 | Status: SHIPPED | OUTPATIENT
Start: 2023-09-11

## 2023-09-11 NOTE — PROGRESS NOTES
Chief Complaint   Patient presents with    Headache     1 month check up on meds     Subjective   Ellie Saini is a 37 y.o. female.           History of Present Illness  Presents with 3 month weight check -continues to gain weight -headaches on and off   Worsening fatigue and malaise   2 pound weight gain since last month   Obesity  This is a chronic problem. The current episode started more than 1 month ago. The problem occurs daily. The problem has been unchanged. Associated symptoms include fatigue and headaches. Pertinent negatives include no arthralgias, chest pain, chills, congestion, coughing, diaphoresis, fever, numbness, sore throat or swollen glands. The treatment provided mild relief.   Fatigue  This is a recurrent problem. The current episode started more than 1 year ago. The problem occurs daily. The problem has been waxing and waning. Associated symptoms include fatigue and headaches. Pertinent negatives include no arthralgias, chest pain, chills, congestion, coughing, diaphoresis, fever, numbness, sore throat or swollen glands. She has tried rest and relaxation for the symptoms. The treatment provided mild relief.   Anxiety  Presents for follow-up visit. Symptoms include decreased concentration, depressed mood, excessive worry, insomnia, nervous/anxious behavior and obsessions. Patient reports no chest pain, confusion, palpitations or shortness of breath. Symptoms occur most days.       Headache  Headache pattern:  Headache sometimes there, sometimes not at all  Recent changes:  Headaches come more often than they used to  Frequency:  1 per week  Lifetime total:  20+  Number of ER visits for headache:  0  Time of day symptoms are worse:  No specific time of day  Insomnia  This is a chronic problem. The current episode started more than 1 year ago. The problem occurs daily. The problem has been waxing and waning. Associated symptoms include fatigue and headaches. Pertinent negatives include  "no arthralgias, chest pain, chills, congestion, coughing, diaphoresis, fever, numbness, sore throat or swollen glands. The treatment provided mild relief.      The following portions of the patient's history were reviewed and updated as appropriate: allergies, current medications, past social history and problem list.    Review of Systems   Constitutional:  Positive for activity change and fatigue. Negative for chills, diaphoresis, fever and unexpected weight change.   HENT:  Negative for congestion, dental problem, drooling, ear discharge and sore throat.    Eyes:  Negative for photophobia, pain, discharge, redness, itching and visual disturbance.   Respiratory: Negative.  Negative for apnea, cough, choking, chest tightness, shortness of breath and wheezing.    Cardiovascular: Negative.  Negative for chest pain, palpitations and leg swelling.   Endocrine: Negative.    Genitourinary: Negative.    Musculoskeletal:  Negative for arthralgias and back pain.   Allergic/Immunologic: Negative.    Neurological:  Positive for headaches. Negative for tremors, seizures, syncope, facial asymmetry, speech difficulty, light-headedness and numbness.   Hematological: Negative.  Negative for adenopathy. Does not bruise/bleed easily.   Psychiatric/Behavioral:  Positive for decreased concentration and sleep disturbance. Negative for agitation, behavioral problems and confusion. The patient is nervous/anxious and has insomnia.          Chronic insomnia      Objective   /80   Pulse 109   Ht 139.7 cm (55\")   Wt 78.5 kg (173 lb)   SpO2 98%   BMI 40.21 kg/m²   Physical Exam  Vitals and nursing note reviewed.   Constitutional:       General: She is not in acute distress.     Appearance: Normal appearance. She is not ill-appearing, toxic-appearing or diaphoretic.   HENT:      Head: Normocephalic and atraumatic.      Right Ear: Tympanic membrane and ear canal normal. There is no impacted cerumen.      Left Ear: Tympanic membrane " normal. There is no impacted cerumen.      Nose: Nose normal. No congestion or rhinorrhea.      Mouth/Throat:      Mouth: Mucous membranes are moist. Mucous membranes are dry.      Pharynx: No oropharyngeal exudate or posterior oropharyngeal erythema.   Eyes:      General: No scleral icterus.        Right eye: No discharge.         Left eye: No discharge.      Pupils: Pupils are equal, round, and reactive to light.   Neck:      Vascular: No carotid bruit.   Cardiovascular:      Rate and Rhythm: Normal rate and regular rhythm.      Pulses: Normal pulses.      Heart sounds: Normal heart sounds. No murmur heard.    No friction rub. No gallop.   Pulmonary:      Effort: Pulmonary effort is normal. No respiratory distress.      Breath sounds: No stridor. No wheezing, rhonchi or rales.   Chest:      Chest wall: No tenderness.   Abdominal:      General: Abdomen is flat. Bowel sounds are normal. There is no distension.      Palpations: Abdomen is soft. There is no mass.      Tenderness: There is no abdominal tenderness. There is no right CVA tenderness, left CVA tenderness, guarding or rebound.      Hernia: No hernia is present.   Musculoskeletal:         General: No swelling, deformity or signs of injury. Normal range of motion.      Cervical back: Normal range of motion. No rigidity or tenderness.      Right lower leg: No edema.      Left lower leg: No edema.   Lymphadenopathy:      Cervical: No cervical adenopathy.   Skin:     General: Skin is warm and dry.      Coloration: Skin is not jaundiced or pale.      Findings: No bruising, erythema, lesion or rash.   Neurological:      General: No focal deficit present.      Mental Status: She is alert and oriented to person, place, and time. Mental status is at baseline.      Cranial Nerves: No cranial nerve deficit.      Sensory: No sensory deficit.      Motor: No weakness.      Coordination: Coordination normal.      Gait: Gait normal.      Deep Tendon Reflexes: Reflexes  normal.   Psychiatric:         Mood and Affect: Mood normal.         Behavior: Behavior normal.            Assessment & Plan     Problems Addressed this Visit          Mental Health    Anxiety       Neuro    Hx of migraines - Primary       Sleep    Primary insomnia     Other Visit Diagnoses       Class 3 severe obesity with serious comorbidity and body mass index (BMI) of 40.0 to 44.9 in adult, unspecified obesity type        Relevant Orders    Ambulatory Referral to Bariatric Surgery    Ambulatory Referral to Sleep Medicine    Malaise and fatigue              Diagnoses         Codes Comments    Hx of migraines    -  Primary ICD-10-CM: Z86.69  ICD-9-CM: V12.49     Class 3 severe obesity with serious comorbidity and body mass index (BMI) of 40.0 to 44.9 in adult, unspecified obesity type     ICD-10-CM: E66.01, Z68.41  ICD-9-CM: 278.01, V85.41     Malaise and fatigue     ICD-10-CM: R53.81, R53.83  ICD-9-CM: 780.79     Anxiety     ICD-10-CM: F41.9  ICD-9-CM: 300.00     Primary insomnia     ICD-10-CM: F51.01  ICD-9-CM: 307.42              New Medications Ordered This Visit   Medications    topiramate (Topamax) 50 MG tablet     Sig: Nightly     Dispense:  90 tablet     Refill:  3     Current Outpatient Medications on File Prior to Visit   Medication Sig Dispense Refill    albuterol sulfate  (90 Base) MCG/ACT inhaler Inhale 2 puffs.      baclofen (LIORESAL) 10 MG tablet Take 1 tablet by mouth 3 (Three) Times a Day. 90 tablet 3    cetirizine (zyrTEC) 10 MG tablet TAKE 1 TABLET BY MOUTH EVERY DAY 30 tablet 5    [facility] Stool Softener 100 MG capsule TAKE 1 CAPSULE BY MOUTH TWICE A DAY 60 capsule 5    dicyclomine (BENTYL) 20 MG tablet Take 1 tablet by mouth Every 6 (Six) Hours As Needed (abdominal pain, cramping, diarrhea). 30 tablet 0    esomeprazole (nexIUM) 40 MG capsule TAKE 1 CAPSULE BY MOUTH EVERY DAY 30 capsule 0    estrogens, conjugated, (Premarin) 0.625 MG tablet Take 1 tablet by mouth Daily. Daily 90 tablet 3     fluticasone (Flonase) 50 MCG/ACT nasal spray 2 sprays into the nostril(s) as directed by provider Daily. 16 g 11    Fremanezumab-vfrm (Ajovy) 225 MG/1.5ML solution auto-injector Inject 225 mg under the skin into the appropriate area as directed Every 30 (Thirty) Days. 1.5 mL 11    hyoscyamine (LEVBID) 0.375 MG 12 hr tablet TAKE 1 TABLET BY MOUTH EVERY 12 (TWELVE) HOURS AS NEEDED FOR CRAMPING. 60 tablet 5    meclizine (ANTIVERT) 25 MG tablet Take 1 tablet by mouth 3 (Three) Times a Day As Needed for Dizziness (tid prn dizziness). 30 tablet 11    meloxicam (Mobic) 15 MG tablet Take 1 tablet by mouth Daily. 90 tablet 3    metFORMIN (Glucophage) 500 MG tablet Take 1 tablet by mouth Daily With Breakfast. 30 tablet 11    metoclopramide (REGLAN) 5 MG tablet TAKE 1 TABLET BY MOUTH TWICE A DAY 60 tablet 5    ondansetron ODT (ZOFRAN-ODT) 4 MG disintegrating tablet Place 1 tablet on the tongue Every 6 (Six) Hours As Needed for Nausea or Vomiting. 15 tablet 0    PARoxetine (Paxil) 10 MG tablet Take 1 tablet by mouth Every Morning. 30 tablet 11    Rimegepant Sulfate (Nurtec) 75 MG tablet dispersible tablet Take 1 tablet by mouth As Needed (as needed for onset of migraine). 8 tablet 11    tiZANidine (ZANAFLEX) 4 MG tablet Take 1 tablet by mouth At Night As Needed for Muscle Spasms. 30 tablet 5    vitamin D (ERGOCALCIFEROL) 1.25 MG (06994 UT) capsule capsule Take 1 capsule by mouth 1 (One) Time Per Week. 4 capsule 5     No current facility-administered medications on file prior to visit.      18 minutes   Follow Up   Return in about 4 weeks (around 10/9/2023).      Cut metformin in half   Add topamax and see if helps with weight loss and headaches     Referral to bariatric as directed, diet discussed  Meds as directed   1800 calorie diet -increase water     Referral for sleep medicine as directed   See back in 4 weeks as directed for weight check

## 2023-09-26 ENCOUNTER — OFFICE VISIT (OUTPATIENT)
Dept: SLEEP MEDICINE | Facility: HOSPITAL | Age: 38
End: 2023-09-26
Payer: COMMERCIAL

## 2023-09-26 VITALS
DIASTOLIC BLOOD PRESSURE: 90 MMHG | HEART RATE: 94 BPM | SYSTOLIC BLOOD PRESSURE: 141 MMHG | WEIGHT: 173.4 LBS | BODY MASS INDEX: 40.13 KG/M2 | OXYGEN SATURATION: 96 % | HEIGHT: 55 IN

## 2023-09-26 DIAGNOSIS — R06.83 SNORING: Primary | ICD-10-CM

## 2023-09-26 DIAGNOSIS — G47.00 FREQUENT NOCTURNAL AWAKENING: ICD-10-CM

## 2023-09-26 DIAGNOSIS — G25.81 RESTLESS LEGS SYNDROME (RLS): ICD-10-CM

## 2023-09-26 DIAGNOSIS — G47.8 NON-RESTORATIVE SLEEP: ICD-10-CM

## 2023-09-26 DIAGNOSIS — G47.10 HYPERSOMNIA: ICD-10-CM

## 2023-09-26 PROCEDURE — 99213 OFFICE O/P EST LOW 20 MIN: CPT | Performed by: NURSE PRACTITIONER

## 2023-09-26 PROCEDURE — 1160F RVW MEDS BY RX/DR IN RCRD: CPT | Performed by: NURSE PRACTITIONER

## 2023-09-26 PROCEDURE — 1159F MED LIST DOCD IN RCRD: CPT | Performed by: NURSE PRACTITIONER

## 2023-09-26 NOTE — PROGRESS NOTES
New Patient Sleep Medicine Consultation    Encounter Date: 9/26/2023         Patient's PCP: Linda Barton APRN  Referring provider: Linda Barton APRN  Reason for consultation chief complaint: snoring, excessive daytime sleepiness, unrefreshing sleep, and insomnia    Ellie Saini is a 37 y.o. female whose bedtime is ~ 8013-3122. She  falls asleep after 30 minutes, and is up 3-4 times per night. Up to the bathroom. Rolls over. Has trouble falling back asleep.  She wakes up ~ 0500. Sleeps later on weekend.  She endorses 6 hours of sleep. She drinks 0 cups of coffee, 0 teas, and 1 sodas per day. She drinks 0 alcoholic beverages per week.      Ellie Saini admits to snoring, unrestful sleep, excessive daytime sleepiness, morning headaches, sleeping less than 6 hours per night, Disturbed or restless sleep, memory loss, Up to the bathroom at night, sleepy driving, night sweats, restless legs at night, difficulty falling asleep, and difficulty staying asleep for >5 years. States aching legs some nights. Relieved with ambulation. She denies cataplexy, sleep paralysis, or hypnagogic hallucinations. She takes no medicine for sleep. Was taking trazodone but caused next day sleepiness.  She has some sleepiness with driving long distances. She naps  no days. She has unsure if she has ever had a sleep study. She sleeps in a bed alone.     She is a never smoker.       Marital status: single   Occupation: infant caregiver   Children: 3  FH of sleep disorders: not that she knows of       Past Medical History:   Diagnosis Date    Acute bronchitis     Acute maxillary sinusitis     Acute otitis media     Acute pharyngitis     Allergic rhinitis     Breast lump     Cramp in limb     Cramp in lower limb - bilateral       Hormone replacement therapy (HRT)     H/O: hormone replacement (HRT)    Insomnia     Lymphadenopathy     Malaise     Other malaise    Migraine     Postoperative visit     Upper respiratory infection       Social History     Socioeconomic History    Marital status: Single    Number of children: 3    Highest education level: Some college, no degree   Tobacco Use    Smoking status: Never    Smokeless tobacco: Never   Vaping Use    Vaping Use: Never used   Substance and Sexual Activity    Alcohol use: No    Drug use: Never    Sexual activity: Defer     Family History   Problem Relation Age of Onset    Suicidality Father     Diabetes Other     Stroke Other          Review of Systems:  Constitutional: positive for fatigue  Eyes: negative  Ears, nose, mouth, throat, and face: positive for snoring  Respiratory: negative  Cardiovascular: negative  Gastrointestinal: negative  Genitourinary:negative  Integument/breast: negative  Hematologic/lymphatic: negative  Musculoskeletal:negative  Neurological: negative  Behavioral/Psych: positive for sleep disturbance  Endocrine: negative  Allergic/Immunologic: negative Patient advised to discuss any positive ROS with PCP.        [place] Sleepiness Scale Score: 13    How likely are you to doze off or fall asleep in the following situation, in contrast to feeling just tired?     Use the following scale to choose the most appropriate number for each situation:    0 = would never doze  1 = slight chance of dozing   2 = moderate chance of dozing   3 = high chance of dozing    It is important that you answer each question as best you can.      Situation       Chance of Dozing (0-3)    Sitting and reading            ___3____    Watching TV          ___3____    Sitting, inactive in a public place (e.g. a theatre or meeting)   ___1____    As a passenger in a car for an hour without break    ___3____    Lying down to rest in the afternoon, when circumstances permit  ___3____    Sitting and talking to someone      ___0____    Sitting quietly after a lunch without alcohol     ___0____    In a car, while stopped for a few minutes in traffic    ___0____         Vitals:    09/26/23 1530   BP:  "141/90   Pulse: 94   SpO2: 96%           09/26/23  1530   Weight: 78.7 kg (173 lb 6.4 oz)       Body mass index is 40.3 kg/m².        Neck circumference: 15\"          General: Alert. Cooperative. Well developed. No acute distress.             Head:  Normocephalic. Symmetrical. Atraumatic.              Eyes: Sclera clear. No icterus. Normal EOM.             Ears: No deformities. Normal hearing.             Nose: No septal deviation. No drainage.          Throat: No oral lesions. No thrush. Moist mucous membranes. Trachea midline    Tongue is normal    Dentition is fair       Pharynx: Posterior pharyngeal pillars are narrow    Mallampati score of II (hard and soft palate, upper portion of tonsils anduvula visible)    Pharynx is normal with unrermarkable tonsils   Chest Wall:  Normal shape. Symmetric expansion with respiration. No tenderness.          Lungs:  Clear to auscultation bilaterally. No wheezes. No rhonchi. No rales. Respirations regular, even and unlabored.            Heart:  Regular rhythm and normal rate. Normal S1 and S2. No murmur.  Extremities:  Moves all extremities well. No edema.               Skin: Dry. Intact. No bleeding. No rash.           Neuro: Moves all 4 extremities and cranial nerves grossly intact.  Psychiatric: Normal mood and affect.      Current Outpatient Medications:     baclofen (LIORESAL) 10 MG tablet, Take 1 tablet by mouth 3 (Three) Times a Day., Disp: 90 tablet, Rfl: 3    cetirizine (zyrTEC) 10 MG tablet, TAKE 1 TABLET BY MOUTH EVERY DAY, Disp: 30 tablet, Rfl: 5    estrogens, conjugated, (Premarin) 0.625 MG tablet, Take 1 tablet by mouth Daily. Daily, Disp: 90 tablet, Rfl: 3    fluticasone (Flonase) 50 MCG/ACT nasal spray, 2 sprays into the nostril(s) as directed by provider Daily., Disp: 16 g, Rfl: 11    Fremanezumab-vfrm (Ajovy) 225 MG/1.5ML solution auto-injector, Inject 225 mg under the skin into the appropriate area as directed Every 30 (Thirty) Days., Disp: 1.5 mL, Rfl: 11   "  hyoscyamine (LEVBID) 0.375 MG 12 hr tablet, TAKE 1 TABLET BY MOUTH EVERY 12 (TWELVE) HOURS AS NEEDED FOR CRAMPING., Disp: 60 tablet, Rfl: 5    meclizine (ANTIVERT) 25 MG tablet, Take 1 tablet by mouth 3 (Three) Times a Day As Needed for Dizziness (tid prn dizziness)., Disp: 30 tablet, Rfl: 11    meloxicam (Mobic) 15 MG tablet, Take 1 tablet by mouth Daily., Disp: 90 tablet, Rfl: 3    metFORMIN (Glucophage) 500 MG tablet, Take 1 tablet by mouth Daily With Breakfast., Disp: 30 tablet, Rfl: 11    metoclopramide (REGLAN) 5 MG tablet, TAKE 1 TABLET BY MOUTH TWICE A DAY, Disp: 60 tablet, Rfl: 5    ondansetron ODT (ZOFRAN-ODT) 4 MG disintegrating tablet, Place 1 tablet on the tongue Every 6 (Six) Hours As Needed for Nausea or Vomiting., Disp: 15 tablet, Rfl: 0    PARoxetine (Paxil) 10 MG tablet, Take 1 tablet by mouth Every Morning., Disp: 30 tablet, Rfl: 11    Rimegepant Sulfate (Nurtec) 75 MG tablet dispersible tablet, Take 1 tablet by mouth As Needed (as needed for onset of migraine)., Disp: 8 tablet, Rfl: 11    tiZANidine (ZANAFLEX) 4 MG tablet, Take 1 tablet by mouth At Night As Needed for Muscle Spasms., Disp: 30 tablet, Rfl: 5    topiramate (Topamax) 50 MG tablet, Nightly, Disp: 90 tablet, Rfl: 3    vitamin D (ERGOCALCIFEROL) 1.25 MG (83610 UT) capsule capsule, Take 1 capsule by mouth 1 (One) Time Per Week., Disp: 4 capsule, Rfl: 5    Lab Results   Component Value Date    WBC 8.97 05/03/2023    HGB 13.6 05/03/2023    HCT 39.6 05/03/2023    MCV 85.2 05/03/2023     05/03/2023     Lab Results   Component Value Date    GLUCOSE 79 05/03/2023    CALCIUM 9.2 05/03/2023     05/03/2023    K 4.3 05/03/2023    CO2 23.8 05/03/2023     05/03/2023    BUN 13 05/03/2023    CREATININE 0.48 (L) 05/03/2023    EGFRIFAFRI 138 03/16/2021    EGFRIFNONA 97 02/04/2021    BCR 27.1 (H) 05/03/2023    ANIONGAP 13.2 05/03/2023     No results found for: INR, PROTIME  No results found for: CKTOTAL, CKMB, CKMBINDEX, TROPONINI,  TROPONINT    No results found for: PHART, RDI8QIG, PO2ART]    Contraindications to home sleep test: none    Assessment and Plan:    Excessive daytime sleepiness- New to me, additional work-up planned   Check HST  Good sleep hygiene   Pertinent labs reviewed   Drowsy driving tips- do not drive if feeling sleepy   RTC in 2 weeks with study results   2.   Snoring- New to me, additional work-up planned    1.   As above   3.   Restless leg syndrome/PLMD   Check HST   Continue to monitor   4.   Nocturnal awakening   5.   Non-restorative sleep   Morbid obesity- BMI 40    1.  Recommend portion control and healthy diet       I obtained a brief history from the patient, reviewed the medical problems and current medications, and made medical decisions regarding treatment based on that information. I answered all of her questions and she verbalized understanding. Discussion involved sleep apnea, home sleep testing, possible health consequences of sleep apnea, and follow-up.             RTC 2 weeks after study for results.             This document has been electronically signed by EDUARDO Sesay on September 26, 2023 15:42 CDT          This document has been electronically signed by EDUARDO Sesay on September 26, 2023         CC: Linda Barton APRN Tapp, Kellye, APRN

## 2023-10-09 ENCOUNTER — TELEPHONE (OUTPATIENT)
Dept: BARIATRICS/WEIGHT MGMT | Facility: CLINIC | Age: 38
End: 2023-10-09

## 2023-10-09 NOTE — TELEPHONE ENCOUNTER
Patient was called to remind them of their new patient class and to bring completed paperwork, arrive 30mins early or if paperwork is not completed arrive 60 minutes early, also they were instructed to go to [place] [id number] to sign up for the patient portal. Patient was also informed that we will do a glucose finger stick at [facility]. The patient was advised this is a long appointment so expect to be here at least 2 hours. The patient was agreeable and voiced an understanding.       She has her ppw completed.

## 2023-10-10 ENCOUNTER — OFFICE VISIT (OUTPATIENT)
Dept: BARIATRICS/WEIGHT MGMT | Facility: CLINIC | Age: 38
End: 2023-10-10
Payer: COMMERCIAL

## 2023-10-10 VITALS
DIASTOLIC BLOOD PRESSURE: 88 MMHG | BODY MASS INDEX: 38.69 KG/M2 | OXYGEN SATURATION: 97 % | TEMPERATURE: 97.8 F | HEART RATE: 92 BPM | HEIGHT: 56 IN | WEIGHT: 172 LBS | SYSTOLIC BLOOD PRESSURE: 130 MMHG

## 2023-10-10 DIAGNOSIS — E66.01 CLASS 2 SEVERE OBESITY DUE TO EXCESS CALORIES WITH SERIOUS COMORBIDITY AND BODY MASS INDEX (BMI) OF 38.0 TO 38.9 IN ADULT: Primary | ICD-10-CM

## 2023-10-10 PROCEDURE — 1160F RVW MEDS BY RX/DR IN RCRD: CPT | Performed by: NURSE PRACTITIONER

## 2023-10-10 PROCEDURE — 1159F MED LIST DOCD IN RCRD: CPT | Performed by: NURSE PRACTITIONER

## 2023-10-10 PROCEDURE — 82962 GLUCOSE BLOOD TEST: CPT | Performed by: NURSE PRACTITIONER

## 2023-10-10 PROCEDURE — 99214 OFFICE O/P EST MOD 30 MIN: CPT | Performed by: NURSE PRACTITIONER

## 2023-10-10 NOTE — PROGRESS NOTES
Patient Care Team:  Linda Barton APRN as PCP - General (Family Medicine)      Subjective     Patient is a 37 y.o. female presents with morbid obesity and her Body mass index is 38.22 kg/m².     Patient was referred to our practice by PCP.  She is here for discussion of surgical weight loss options.  She stated she has been with the disease of obesity for 10 years years.  She stated she suffers from possible LIUDMILA  and morbid obesity due to her weight gain.  She stated that weight loss helps alleviate these symptoms.   she has tried calorie counting . she stated that she has attempted these conservative methods for weight loss without maintaining long term success.  Today she would like to discuss surgical weight loss options such as the Laparoscopic Sleeve Gastrectomy or the Laparoscopic R - Y Gastric Bypass.     she states the weight worsened around 10 years ago after hysterectomy and the highest weight was today at 172 lbs.    Review of Systems   Constitutional: Negative.    Respiratory: Negative.     Cardiovascular: Negative.    Gastrointestinal: Negative.    Endocrine: Negative.    Musculoskeletal: Negative.    Psychiatric/Behavioral: Negative.          History  Past Medical History:   Diagnosis Date    Acute bronchitis     Acute maxillary sinusitis     Acute otitis media     Acute pharyngitis     Allergic rhinitis     Breast lump     Cramp in limb     Cramp in lower limb - bilateral       Hormone replacement therapy (HRT)     H/O: hormone replacement (HRT)    Insomnia     Lymphadenopathy     Malaise     Other malaise    Migraine     Postoperative visit     Upper respiratory infection       Past Surgical History:   Procedure Laterality Date     SECTION      X 2    COLONOSCOPY  2008    COLONOSCOPY N/A 2019    Procedure: COLONOSCOPY;  Surgeon: Cong Devine DO;  Location: [facility] ENDOSCOPY;  Service: Gastroenterology    COLONOSCOPY N/A 10/11/2022    Procedure: COLONOSCOPY;  Surgeon: Claudio  MD Jorge L;  Location: [facility] ENDOSCOPY;  Service: Gastroenterology;  Laterality: N/A;    DIAGNOSTIC LAPAROSCOPY  07/17/2014    ENDOSCOPY N/A 1/14/2019    Procedure: ESOPHAGOGASTRODUODENOSCOPY;  Surgeon: Cong Devine DO;  Location: [facility] ENDOSCOPY;  Service: Gastroenterology    ENDOSCOPY N/A 10/11/2022    Procedure: ESOPHAGOGASTRODUODENOSCOPY;  Surgeon: Jorge L Snyder MD;  Location: [facility] ENDOSCOPY;  Service: Gastroenterology;  Laterality: N/A;    LAPAROSCOPIC CHOLECYSTECTOMY  08/28/2007    PAP SMEAR  03/23/2009    PROCEDURE GENERIC CONVERTED  01/31/2013    REMOVE INTRAUTERINE DEVICE     TOTAL ABDOMINAL HYSTERECTOMY WITH SALPINGO OOPHORECTOMY  09/04/2014    UPPER GASTROINTESTINAL ENDOSCOPY  01/14/2019    Dr. Devine      Social History     Socioeconomic History    Marital status: Single    Number of children: 3    Highest education level: Some college, no degree   Tobacco Use    Smoking status: Never    Smokeless tobacco: Never   Vaping Use    Vaping Use: Never used   Substance and Sexual Activity    Alcohol use: No    Drug use: Never    Sexual activity: Defer      Family History   Problem Relation Age of Onset    Suicidality Father     Diabetes Other     Stroke Other       Allergies   Allergen Reactions    Hydrocodone GI Intolerance    Other Other (See Comments)     Seasonal Allergies    Shrimp Unknown - High Severity          Current Outpatient Medications:     baclofen (LIORESAL) 10 MG tablet, Take 1 tablet by mouth 3 (Three) Times a Day., Disp: 90 tablet, Rfl: 3    cetirizine (zyrTEC) 10 MG tablet, TAKE 1 TABLET BY MOUTH EVERY DAY, Disp: 30 tablet, Rfl: 5    estrogens, conjugated, (Premarin) 0.625 MG tablet, Take 1 tablet by mouth Daily. Daily, Disp: 90 tablet, Rfl: 3    Fremanezumab-vfrm (Ajovy) 225 MG/1.5ML solution auto-injector, Inject 225 mg under the skin into the appropriate area as directed Every 30 (Thirty) Days., Disp: 1.5 mL, Rfl: 11    hyoscyamine (LEVBID) 0.375 MG 12 hr tablet, TAKE 1  TABLET BY MOUTH EVERY 12 (TWELVE) HOURS AS NEEDED FOR CRAMPING., Disp: 60 tablet, Rfl: 5    meclizine (ANTIVERT) 25 MG tablet, Take 1 tablet by mouth 3 (Three) Times a Day As Needed for Dizziness (tid prn dizziness)., Disp: 30 tablet, Rfl: 11    meloxicam (Mobic) 15 MG tablet, Take 1 tablet by mouth Daily., Disp: 90 tablet, Rfl: 3    metFORMIN (Glucophage) 500 MG tablet, Take 1 tablet by mouth Daily With Breakfast., Disp: 30 tablet, Rfl: 11    metoclopramide (REGLAN) 5 MG tablet, TAKE 1 TABLET BY MOUTH TWICE A DAY, Disp: 60 tablet, Rfl: 5    ondansetron ODT (ZOFRAN-ODT) 4 MG disintegrating tablet, Place 1 tablet on the tongue Every 6 (Six) Hours As Needed for Nausea or Vomiting., Disp: 15 tablet, Rfl: 0    PARoxetine (Paxil) 10 MG tablet, Take 1 tablet by mouth Every Morning., Disp: 30 tablet, Rfl: 11    Rimegepant Sulfate (Nurtec) 75 MG tablet dispersible tablet, Take 1 tablet by mouth As Needed (as needed for onset of migraine)., Disp: 8 tablet, Rfl: 11    tiZANidine (ZANAFLEX) 4 MG tablet, Take 1 tablet by mouth At Night As Needed for Muscle Spasms., Disp: 30 tablet, Rfl: 5    vitamin D (ERGOCALCIFEROL) 1.25 MG (39494 UT) capsule capsule, Take 1 capsule by mouth 1 (One) Time Per Week., Disp: 4 capsule, Rfl: 5    Objective     Vital Signs     Body mass index is 38.22 kg/m².      10/10/23  1410   Weight: 78 kg (172 lb)            Physical Exam  Vitals reviewed.   Constitutional:       Appearance: She is obese.   Cardiovascular:      Rate and Rhythm: Normal rate and regular rhythm.   Pulmonary:      Effort: Pulmonary effort is normal.   Abdominal:      General: Bowel sounds are normal.      Palpations: Abdomen is soft.   Musculoskeletal:         General: Normal range of motion.   Skin:     General: Skin is warm and dry.   Neurological:      Mental Status: She is alert and oriented to person, place, and time.   Psychiatric:         Mood and Affect: Mood normal.         Behavior: Behavior normal.            Results  Review:   I reviewed the patient's new clinical results.      Assessment & Plan   Diagnoses and all orders for this visit:    1. Class 2 severe obesity due to excess calories with serious comorbidity and body mass index (BMI) of 38.0 to 38.9 in adult (Primary)  -     POC Glucose Fingerstick        I discussed the patient's findings and my recommendations with patient.     I have also recommended that she obtain a cardiac risk assessment and psychiatric evaluation as well as any other preoperative requirements prior to surgery consideration.        Patient is a 37 y.o. female who has morbid obesity with Body mass index is 38.22 kg/m². and desires surgical weight loss. Patient has been advised that this surgery is considered to be elective major surgery that is typically done laparoscopically. Patient is a potentially good surgical candidate. Patient will need to meet with the Bariatric Surgeon to further discuss surgical options. Patient has been advised that they will need to have a work-up prior to surgery. This work-up will include but is not limited to an EKG, an EGD to assess for H. Pylori and Owens's esophagus, and a psychological evaluation, with additional testing as necessary. Pre-op testing will be ordered at next visit. Today the patient received the 4 meals/day diet prescription, which was explained to patient.  Patient will see the dietitian today to further discuss goals for diet, exercise, and lifestyle. Patient has received intensive behavioral therapy for obesity today. I explained the pathophysiology of the disease and its storage component. We also discussed Dr. Miller' pearls of the program. Nutrition counseling ordered today.     2. Current comorbid condition of possible LIUDMILA associated with she morbid obesity is reported to be stable on she current treatment regimen and medications. We anticipate the comorbid condition to improve as we address she morbid obesity.          Pre-op testing:      Seminar - Completed  EGD - Needed, but not yet ordered  H. Pylori - Will be done with EGD   H. Pylori Stool -  N/A    Psychological Evaluation - Needed, but not yet ordered    EKG - Needed, but not yet ordered    Cardiology - If EKG abnormal, cardiology will be ordered     TSH - Needed, but not yet ordered  Nicotine - N/A    Pulmonary Clearance - N/A   Drug Tests - N/A    Dietitian - Completed      NM Gastric Emptying (12/14/2018 11:10)     Patient will begin GREEN meal plan.  Patient has hx of gastroparesis so we discussed seeing Dr Gleason for a gastric bypass if she is positive for LIUDMILA. She had a sleep study last night and is waiting on results .     A total of 30 minutes was spent face to face with this patient and over half of the time was spent on counseling and coordination of care for the disease of obesity. We specifically reviewed the dietary prescription and I made recommendations toward increasing exercise as tolerated as well as focusing on training their behavior toward storing less.    Sharee Harman, EDUARDO     10/10/23  14:22 CDT

## 2023-10-11 LAB — GLUCOSE BLDC GLUCOMTR-MCNC: 108 MG/DL (ref 70–130)

## 2023-11-09 ENCOUNTER — OFFICE VISIT (OUTPATIENT)
Dept: BARIATRICS/WEIGHT MGMT | Facility: CLINIC | Age: 38
End: 2023-11-09
Payer: COMMERCIAL

## 2023-11-09 ENCOUNTER — LAB (OUTPATIENT)
Dept: LAB | Facility: HOSPITAL | Age: 38
End: 2023-11-09
Payer: COMMERCIAL

## 2023-11-09 ENCOUNTER — HOSPITAL ENCOUNTER (OUTPATIENT)
Dept: GENERAL RADIOLOGY | Facility: HOSPITAL | Age: 38
Discharge: HOME | End: 2023-11-09
Admitting: NURSE PRACTITIONER
Payer: COMMERCIAL

## 2023-11-09 VITALS
DIASTOLIC BLOOD PRESSURE: 92 MMHG | WEIGHT: 170.4 LBS | SYSTOLIC BLOOD PRESSURE: 134 MMHG | HEART RATE: 87 BPM | OXYGEN SATURATION: 98 % | TEMPERATURE: 97.7 F | HEIGHT: 56 IN | BODY MASS INDEX: 38.33 KG/M2

## 2023-11-09 DIAGNOSIS — Z01.811 PREOPERATIVE RESPIRATORY EXAMINATION: ICD-10-CM

## 2023-11-09 DIAGNOSIS — E66.01 CLASS 2 SEVERE OBESITY DUE TO EXCESS CALORIES WITH SERIOUS COMORBIDITY AND BODY MASS INDEX (BMI) OF 38.0 TO 38.9 IN ADULT: ICD-10-CM

## 2023-11-09 DIAGNOSIS — E66.01 CLASS 2 SEVERE OBESITY DUE TO EXCESS CALORIES WITH SERIOUS COMORBIDITY AND BODY MASS INDEX (BMI) OF 38.0 TO 38.9 IN ADULT: Primary | ICD-10-CM

## 2023-11-09 DIAGNOSIS — G47.33 OSA (OBSTRUCTIVE SLEEP APNEA): ICD-10-CM

## 2023-11-09 DIAGNOSIS — Z13.21 SCREENING FOR MALNUTRITION: ICD-10-CM

## 2023-11-09 DIAGNOSIS — K31.84 GASTROPARESIS: ICD-10-CM

## 2023-11-09 LAB
ALBUMIN SERPL-MCNC: 4.5 G/DL (ref 3.5–5.2)
ALBUMIN/GLOB SERPL: 1.5 G/DL
ALP SERPL-CCNC: 57 U/L (ref 39–117)
ALT SERPL W P-5'-P-CCNC: 26 U/L (ref 1–33)
ANION GAP SERPL CALCULATED.3IONS-SCNC: 10 MMOL/L (ref 5–15)
AST SERPL-CCNC: 20 U/L (ref 1–32)
BASOPHILS # BLD AUTO: 0.05 10*3/MM3 (ref 0–0.2)
BASOPHILS NFR BLD AUTO: 0.7 % (ref 0–1.5)
BILIRUB SERPL-MCNC: 0.2 MG/DL (ref 0–1.2)
BUN SERPL-MCNC: 17 MG/DL (ref 6–20)
BUN/CREAT SERPL: 36.2 (ref 7–25)
CALCIUM SPEC-SCNC: 9.5 MG/DL (ref 8.6–10.5)
CHLORIDE SERPL-SCNC: 100 MMOL/L (ref 98–107)
CO2 SERPL-SCNC: 27 MMOL/L (ref 22–29)
CREAT SERPL-MCNC: 0.47 MG/DL (ref 0.57–1)
DEPRECATED RDW RBC AUTO: 42.3 FL (ref 37–54)
EGFRCR SERPLBLD CKD-EPI 2021: 125.9 ML/MIN/1.73
EOSINOPHIL # BLD AUTO: 0.2 10*3/MM3 (ref 0–0.4)
EOSINOPHIL NFR BLD AUTO: 2.7 % (ref 0.3–6.2)
ERYTHROCYTE [DISTWIDTH] IN BLOOD BY AUTOMATED COUNT: 12.9 % (ref 12.3–15.4)
GLOBULIN UR ELPH-MCNC: 3 GM/DL
GLUCOSE SERPL-MCNC: 87 MG/DL (ref 65–99)
HBA1C MFR BLD: 5.5 % (ref 4.8–5.6)
HCT VFR BLD AUTO: 42.7 % (ref 34–46.6)
HGB BLD-MCNC: 13.3 G/DL (ref 12–15.9)
IMM GRANULOCYTES # BLD AUTO: 0.04 10*3/MM3 (ref 0–0.05)
IMM GRANULOCYTES NFR BLD AUTO: 0.5 % (ref 0–0.5)
LYMPHOCYTES # BLD AUTO: 2.72 10*3/MM3 (ref 0.7–3.1)
LYMPHOCYTES NFR BLD AUTO: 37.4 % (ref 19.6–45.3)
MCH RBC QN AUTO: 28 PG (ref 26.6–33)
MCHC RBC AUTO-ENTMCNC: 31.1 G/DL (ref 31.5–35.7)
MCV RBC AUTO: 89.9 FL (ref 79–97)
MONOCYTES # BLD AUTO: 0.47 10*3/MM3 (ref 0.1–0.9)
MONOCYTES NFR BLD AUTO: 6.5 % (ref 5–12)
NEUTROPHILS NFR BLD AUTO: 3.8 10*3/MM3 (ref 1.7–7)
NEUTROPHILS NFR BLD AUTO: 52.2 % (ref 42.7–76)
NRBC BLD AUTO-RTO: 0 /100 WBC (ref 0–0.2)
PLATELET # BLD AUTO: 374 10*3/MM3 (ref 140–450)
PMV BLD AUTO: 9.4 FL (ref 6–12)
POTASSIUM SERPL-SCNC: 3.9 MMOL/L (ref 3.5–5.2)
PROT SERPL-MCNC: 7.5 G/DL (ref 6–8.5)
RBC # BLD AUTO: 4.75 10*6/MM3 (ref 3.77–5.28)
SODIUM SERPL-SCNC: 137 MMOL/L (ref 136–145)
WBC NRBC COR # BLD: 7.28 10*3/MM3 (ref 3.4–10.8)

## 2023-11-09 PROCEDURE — 82746 ASSAY OF FOLIC ACID SERUM: CPT

## 2023-11-09 PROCEDURE — 84590 ASSAY OF VITAMIN A: CPT

## 2023-11-09 PROCEDURE — 82607 VITAMIN B-12: CPT

## 2023-11-09 PROCEDURE — 82525 ASSAY OF COPPER: CPT

## 2023-11-09 PROCEDURE — 83036 HEMOGLOBIN GLYCOSYLATED A1C: CPT

## 2023-11-09 PROCEDURE — 84446 ASSAY OF VITAMIN E: CPT

## 2023-11-09 PROCEDURE — 80053 COMPREHEN METABOLIC PANEL: CPT

## 2023-11-09 PROCEDURE — 85025 COMPLETE CBC W/AUTO DIFF WBC: CPT

## 2023-11-09 PROCEDURE — 83540 ASSAY OF IRON: CPT

## 2023-11-09 PROCEDURE — 36415 COLL VENOUS BLD VENIPUNCTURE: CPT

## 2023-11-09 PROCEDURE — 82306 VITAMIN D 25 HYDROXY: CPT

## 2023-11-09 PROCEDURE — 71046 X-RAY EXAM CHEST 2 VIEWS: CPT

## 2023-11-09 PROCEDURE — 84425 ASSAY OF VITAMIN B-1: CPT

## 2023-11-09 PROCEDURE — 84207 ASSAY OF VITAMIN B-6: CPT

## 2023-11-09 PROCEDURE — 84630 ASSAY OF ZINC: CPT

## 2023-11-09 PROCEDURE — 84443 ASSAY THYROID STIM HORMONE: CPT

## 2023-11-09 RX ORDER — PAROXETINE HYDROCHLORIDE 20 MG/1
20 TABLET, FILM COATED ORAL EVERY MORNING
COMMUNITY
Start: 2023-10-02

## 2023-11-09 RX ORDER — HYDROCHLOROTHIAZIDE 25 MG/1
TABLET ORAL
COMMUNITY
Start: 2023-10-18 | End: 2024-10-19

## 2023-11-09 NOTE — PROGRESS NOTES
"Patient Care Team:  Linda Barton APRN as PCP - General (Family Medicine)    Reason for Visit:  Surgical Weight loss    Subjective   Ellie Saini is a 37 y.o. female.     Ellie is here for follow-up and continued medical management of her morbid obesity.  She is currently on a prescription diet.  Ellie previously was to apply dietary changes such as following the meal plan as directed.  Patient admits to eating around 1 meals per day.  She  admits to drinking at least 32 ounces of fluid each day and  is unsure how many grams protein she is intaking .  She  is currently exercising none..  She  states that she has gone without soda intake and denies  nicotine use.  Patient has lost 2  pounds since her last appointment with us.     Review Of Systems:  Review of Systems   Constitutional: Negative.    Respiratory: Negative.     Cardiovascular: Negative.    Gastrointestinal: Negative.    Endocrine: Negative.    Musculoskeletal: Negative.    Psychiatric/Behavioral: Negative.           The following portions of the patient's history were reviewed and updated as appropriate: allergies, current medications, past family history, past medical history, past social history, past surgical history, and problem list.    Objective   /92 (BP Location: Right arm, Patient Position: Sitting, Cuff Size: Adult)   Pulse 87   Temp 97.7 °F (36.5 °C)   Ht 142.9 cm (56.25\")   Wt 77.3 kg (170 lb 6.4 oz)   SpO2 98%   BMI 37.86 kg/m²       11/09/23  1454   Weight: 77.3 kg (170 lb 6.4 oz)            Physical Exam  Vitals reviewed.   Constitutional:       Appearance: She is obese.   Cardiovascular:      Rate and Rhythm: Normal rate and regular rhythm.   Pulmonary:      Effort: Pulmonary effort is normal.   Abdominal:      General: Bowel sounds are normal.      Palpations: Abdomen is soft.   Musculoskeletal:         General: Normal range of motion.   Skin:     General: Skin is warm and dry.   Neurological:      Mental " Status: She is alert and oriented to person, place, and time.   Psychiatric:         Mood and Affect: Mood normal.         Behavior: Behavior normal.               Assessment & Plan   Diagnoses and all orders for this visit:    1. Class 2 severe obesity due to excess calories with serious comorbidity and body mass index (BMI) of 38.0 to 38.9 in adult (Primary)  Assessment & Plan:  Patient's (Body mass index is 37.86 kg/m².) indicates that they are morbidly/severely obese (BMI > 40 or > 35 with obesity - related health condition) with health conditions that include obstructive sleep apnea . Weight is improving with treatment. BMI  is above average; BMI management plan is completed. We discussed portion control and increasing exercise.     Orders:  -     TSH; Future  -     Hemoglobin A1c; Future  -     CBC & Differential; Future  -     Comprehensive Metabolic Panel; Future  -     Vitamin A & E; Future  -     Vitamin D,25-Hydroxy; Future  -     Copper, Serum; Future  -     Vitamin B6; Future  -     Folate; Future  -     Zinc; Future  -     Vitamin B12; Future  -     Iron; Future  -     Cancel: Vitamin B1; Future  -     Ambulatory Referral to Psychiatry  -     ECG 12 Lead; Future  -     Complete PFT - Pre & Post Bronchodilator; Future  -     XR Chest 2 View; Future  -     Ambulatory Referral to Bariatric Surgery    2. Gastroparesis  -     TSH; Future  -     Hemoglobin A1c; Future  -     CBC & Differential; Future  -     Comprehensive Metabolic Panel; Future  -     Vitamin A & E; Future  -     Vitamin D,25-Hydroxy; Future  -     Copper, Serum; Future  -     Vitamin B6; Future  -     Folate; Future  -     Zinc; Future  -     Vitamin B12; Future  -     Iron; Future  -     Cancel: Vitamin B1; Future  -     ECG 12 Lead; Future  -     Ambulatory Referral to Bariatric Surgery    3. LIUDMILA (obstructive sleep apnea)  -     TSH; Future  -     Hemoglobin A1c; Future  -     CBC & Differential; Future  -     Comprehensive Metabolic  Panel; Future  -     Vitamin A & E; Future  -     Vitamin D,25-Hydroxy; Future  -     Copper, Serum; Future  -     Vitamin B6; Future  -     Folate; Future  -     Zinc; Future  -     Vitamin B12; Future  -     Iron; Future  -     Cancel: Vitamin B1; Future  -     Ambulatory Referral to Bariatric Surgery    4. Screening for malnutrition  -     TSH; Future  -     Hemoglobin A1c; Future  -     CBC & Differential; Future  -     Comprehensive Metabolic Panel; Future  -     Vitamin A & E; Future  -     Vitamin D,25-Hydroxy; Future  -     Copper, Serum; Future  -     Vitamin B6; Future  -     Folate; Future  -     Zinc; Future  -     Vitamin B12; Future  -     Iron; Future  -     Cancel: Vitamin B1; Future    5. Preoperative respiratory examination  -     Complete PFT - Pre & Post Bronchodilator; Future  -     XR Chest 2 View; Future         Ellie Saini was seen today for follow-up, obesity, nutrition counseling and weight loss.    Today we discussed healthy changes in lifestyle, diet, and exercise. Dietician consultation obtained.  Ellie Saini had received handouts to her explaining the recommendation on portion sizes/appetite control/reading nutrition labels.   Intensive behavioral therapy for obesity was done today as well.     Goals for this month are:   Increase meals from 1 per day to 2-3 per day. Eat 1 cup of food at each meal.   Only snack on free foods  Will refer to Dr Gleason     Follow up in 1 month for a weight recheck.    Patient will continue GREEN meal plan. A total of 20 minutes was spent face to face with this patient and over half of the time was spent on counseling and coordination of care for the disease of obesity. We specifically reviewed the dietary prescription and I made recommendations toward increasing exercise as tolerated as well as focusing on training their behavior toward storing less.

## 2023-11-10 LAB
25(OH)D3 SERPL-MCNC: 50.8 NG/ML (ref 30–100)
FOLATE SERPL-MCNC: 14.2 NG/ML (ref 4.78–24.2)
IRON 24H UR-MRATE: 71 MCG/DL (ref 37–145)
TSH SERPL DL<=0.05 MIU/L-ACNC: 2.48 UIU/ML (ref 0.27–4.2)
VIT B12 BLD-MCNC: 680 PG/ML (ref 211–946)

## 2023-11-14 PROBLEM — K31.84 GASTROPARESIS: Status: ACTIVE | Noted: 2023-11-14

## 2023-11-14 PROBLEM — E66.812 CLASS 2 SEVERE OBESITY DUE TO EXCESS CALORIES WITH SERIOUS COMORBIDITY AND BODY MASS INDEX (BMI) OF 38.0 TO 38.9 IN ADULT: Status: ACTIVE | Noted: 2023-11-14

## 2023-11-14 PROBLEM — E66.01 CLASS 2 SEVERE OBESITY DUE TO EXCESS CALORIES WITH SERIOUS COMORBIDITY AND BODY MASS INDEX (BMI) OF 38.0 TO 38.9 IN ADULT: Status: ACTIVE | Noted: 2023-11-14

## 2023-11-14 PROBLEM — G47.33 OSA (OBSTRUCTIVE SLEEP APNEA): Status: ACTIVE | Noted: 2023-11-14

## 2023-11-14 LAB
PYRIDOXAL PHOS SERPL-MCNC: 7.5 UG/L (ref 3.4–65.2)
VIT B1 BLD-SCNC: 125.1 NMOL/L (ref 66.5–200)

## 2023-11-14 NOTE — ASSESSMENT & PLAN NOTE
Patient's (Body mass index is 37.86 kg/m².) indicates that they are morbidly/severely obese (BMI > 40 or > 35 with obesity - related health condition) with health conditions that include obstructive sleep apnea . Weight is improving with treatment. BMI  is above average; BMI management plan is completed. We discussed portion control and increasing exercise.

## 2023-11-16 LAB
A-TOCOPHEROL VIT E SERPL-MCNC: 12.2 MG/L (ref 5.9–19.4)
GAMMA TOCOPHEROL SERPL-MCNC: 4.6 MG/L (ref 0.7–4.9)
VIT A SERPL-MCNC: 59.4 UG/DL (ref 18.9–57.3)

## 2023-11-20 LAB — COPPER SERPL-MCNC: 190 UG/DL (ref 80–158)

## 2023-11-21 LAB — ZINC SERPL-MCNC: 62 UG/DL (ref 44–115)

## 2023-11-22 ENCOUNTER — TELEPHONE (OUTPATIENT)
Dept: BARIATRICS/WEIGHT MGMT | Facility: CLINIC | Age: 38
End: 2023-11-22
Payer: COMMERCIAL

## 2023-11-22 NOTE — TELEPHONE ENCOUNTER
Patient advised and voiced an understanding      ----- Message from EDUARDO Morocho sent at 11/21/2023  1:57 PM CST -----  Please advise labwork is stable

## 2023-12-08 ENCOUNTER — HOSPITAL ENCOUNTER (OUTPATIENT)
Dept: PULMONOLOGY | Facility: HOSPITAL | Age: 38
Discharge: HOME | End: 2023-12-08
Payer: COMMERCIAL

## 2023-12-08 DIAGNOSIS — Z01.811 PREOPERATIVE RESPIRATORY EXAMINATION: ICD-10-CM

## 2023-12-08 DIAGNOSIS — E66.01 CLASS 2 SEVERE OBESITY WITH SERIOUS COMORBIDITY AND BODY MASS INDEX (BMI) OF 37.0 TO 37.9 IN ADULT, UNSPECIFIED OBESITY TYPE: ICD-10-CM

## 2023-12-08 PROCEDURE — 94729 DIFFUSING CAPACITY: CPT

## 2023-12-08 PROCEDURE — 94060 EVALUATION OF WHEEZING: CPT

## 2023-12-08 PROCEDURE — 94726 PLETHYSMOGRAPHY LUNG VOLUMES: CPT

## 2023-12-08 RX ORDER — ALBUTEROL SULFATE 2.5 MG/3ML
2.5 SOLUTION RESPIRATORY (INHALATION) ONCE
Status: COMPLETED | OUTPATIENT
Start: 2023-12-08 | End: 2023-12-08

## 2023-12-08 RX ADMIN — ALBUTEROL SULFATE 2.5 MG: 2.5 SOLUTION RESPIRATORY (INHALATION) at 14:10

## 2023-12-13 ENCOUNTER — OFFICE VISIT (OUTPATIENT)
Dept: BARIATRICS/WEIGHT MGMT | Facility: CLINIC | Age: 38
End: 2023-12-13
Payer: COMMERCIAL

## 2023-12-13 VITALS
HEART RATE: 92 BPM | DIASTOLIC BLOOD PRESSURE: 79 MMHG | SYSTOLIC BLOOD PRESSURE: 123 MMHG | TEMPERATURE: 97.8 F | WEIGHT: 172.2 LBS | OXYGEN SATURATION: 98 % | HEIGHT: 56 IN | BODY MASS INDEX: 38.74 KG/M2

## 2023-12-13 DIAGNOSIS — E66.01 CLASS 2 SEVERE OBESITY DUE TO EXCESS CALORIES WITH SERIOUS COMORBIDITY AND BODY MASS INDEX (BMI) OF 38.0 TO 38.9 IN ADULT: Primary | ICD-10-CM

## 2023-12-13 DIAGNOSIS — G47.33 OSA (OBSTRUCTIVE SLEEP APNEA): ICD-10-CM

## 2023-12-13 DIAGNOSIS — K31.84 GASTROPARESIS: ICD-10-CM

## 2023-12-13 NOTE — ASSESSMENT & PLAN NOTE
Patient's (Body mass index is 38.26 kg/m².) indicates that they are morbidly/severely obese (BMI > 40 or > 35 with obesity - related health condition) with health conditions that include obstructive sleep apnea . Weight is improving with treatment. BMI  is above average; BMI management plan is completed. We discussed portion control and increasing exercise.

## 2023-12-13 NOTE — PROGRESS NOTES
"Patient Care Team:  Linda Barton APRN as PCP - General (Family Medicine)    Reason for Visit:  Surgical Weight loss    Subjective   Ellie Saini is a 37 y.o. female.     Ellie is here for follow-up and continued medical management of her morbid obesity. She has been diagnosed with gastroparesis and was referred to Dr Gleason for evaluation for a gastric bypass. She is currently eating three meals per day at 1 cup total and states this is a struggle.  She is currently on a prescription diet.  Ellie previously was to apply dietary changes such as following the meal plan as directed.  Patient admits to eating around 3 meals per day.  She  admits to drinking at least 32 ounces of fluid each day and intaking around 50-60 grams of protein..  She  is currently exercising by walking after work each day..  She  states that she has gone without soda intake and denies  nicotine use.  Patient has gained 2  pounds since her last appointment with us.     Review Of Systems:  Review of Systems   Constitutional: Negative.    Respiratory: Negative.     Cardiovascular: Negative.    Gastrointestinal:  Positive for abdominal pain and nausea.   Endocrine: Negative.    Musculoskeletal: Negative.    Psychiatric/Behavioral: Negative.           The following portions of the patient's history were reviewed and updated as appropriate: allergies, current medications, past family history, past medical history, past social history, past surgical history, and problem list.    Objective   /79 (BP Location: Right arm, Patient Position: Sitting, Cuff Size: Adult)   Pulse 92   Temp 97.8 °F (36.6 °C)   Ht 142.9 cm (56.25\")   Wt 78.1 kg (172 lb 3.2 oz)   SpO2 98%   BMI 38.26 kg/m²       12/13/23  1428   Weight: 78.1 kg (172 lb 3.2 oz)            Physical Exam  Vitals reviewed.   Constitutional:       Appearance: She is obese.   Cardiovascular:      Rate and Rhythm: Normal rate and regular rhythm.   Pulmonary:      Effort: " Pulmonary effort is normal.   Abdominal:      General: Bowel sounds are normal.      Palpations: Abdomen is soft.   Musculoskeletal:         General: Normal range of motion.   Skin:     General: Skin is warm and dry.   Neurological:      Mental Status: She is alert and oriented to person, place, and time.   Psychiatric:         Mood and Affect: Mood normal.         Behavior: Behavior normal.         Assessment & Plan   Diagnoses and all orders for this visit:    1. Class 2 severe obesity due to excess calories with serious comorbidity and body mass index (BMI) of 38.0 to 38.9 in adult (Primary)  Assessment & Plan:  Patient's (Body mass index is 38.26 kg/m².) indicates that they are morbidly/severely obese (BMI > 40 or > 35 with obesity - related health condition) with health conditions that include obstructive sleep apnea . Weight is improving with treatment. BMI  is above average; BMI management plan is completed. We discussed portion control and increasing exercise.       2. LIUDMILA (obstructive sleep apnea)  Comments:  States she is waiting on her CPAP supplies to come in the mail, new dx    3. Gastroparesis  Comments:  Referred to Dr Gleason for evalaution for bypass, continue current regimen. Portion size at 1 cup         Ellie Saini was seen today for follow-up, obesity, nutrition counseling and weight loss.    Today we discussed healthy changes in lifestyle, diet, and exercise. Dietician consultation obtained.  Ellie Saini had received handouts to her explaining the recommendation on portion sizes/appetite control/reading nutrition labels.   Intensive behavioral therapy for obesity was done today as well.     Goals for this month are:   Log meals and bring food journal into next appointment- she states because she always eats the same thing she does not write it down. She needs to add vegetables to meal 1.   She states a second meal is either leftovers from the night before- she often chooses  stir johnson and taco salad.   2. Half of all meals need to be vegetables, she verbalizes understanding.     Follow up in 1 month for a weight recheck.    Patient will continue GREEN meal plan- 1 CUP TOTAL all meals due to abdominal pain and nausea.

## 2024-01-10 ENCOUNTER — CONSULT (OUTPATIENT)
Dept: BARIATRICS/WEIGHT MGMT | Facility: CLINIC | Age: 39
End: 2024-01-10
Payer: COMMERCIAL

## 2024-01-10 VITALS
DIASTOLIC BLOOD PRESSURE: 99 MMHG | SYSTOLIC BLOOD PRESSURE: 137 MMHG | HEIGHT: 56 IN | WEIGHT: 168 LBS | BODY MASS INDEX: 37.79 KG/M2 | HEART RATE: 78 BPM | TEMPERATURE: 97.3 F

## 2024-01-10 DIAGNOSIS — E66.9 OBESITY, CLASS II, BMI 35-39.9: Primary | ICD-10-CM

## 2024-01-10 DIAGNOSIS — K31.84 GASTROPARESIS: ICD-10-CM

## 2024-01-10 DIAGNOSIS — Z01.818 PRE-OP EVALUATION: ICD-10-CM

## 2024-01-10 DIAGNOSIS — K21.9 GASTROESOPHAGEAL REFLUX DISEASE, UNSPECIFIED WHETHER ESOPHAGITIS PRESENT: ICD-10-CM

## 2024-01-10 DIAGNOSIS — G47.33 OSA (OBSTRUCTIVE SLEEP APNEA): ICD-10-CM

## 2024-01-10 PROBLEM — N39.0 URINARY TRACT INFECTION: Status: RESOLVED | Noted: 2017-08-25 | Resolved: 2024-01-10

## 2024-01-10 PROBLEM — E66.01 CLASS 2 SEVERE OBESITY DUE TO EXCESS CALORIES WITH SERIOUS COMORBIDITY AND BODY MASS INDEX (BMI) OF 38.0 TO 38.9 IN ADULT: Status: RESOLVED | Noted: 2023-11-14 | Resolved: 2024-01-10

## 2024-01-10 PROBLEM — J06.9 UPPER RESPIRATORY INFECTION, ACUTE: Status: RESOLVED | Noted: 2017-04-28 | Resolved: 2024-01-10

## 2024-01-10 PROBLEM — Z23 NEED FOR VACCINATION: Status: RESOLVED | Noted: 2018-10-22 | Resolved: 2024-01-10

## 2024-01-10 PROBLEM — R60.9 EDEMA: Status: ACTIVE | Noted: 2024-01-10

## 2024-01-10 PROBLEM — Z71.3 DIETARY COUNSELING: Status: ACTIVE | Noted: 2024-01-10

## 2024-01-10 PROBLEM — R53.81 MALAISE: Status: RESOLVED | Noted: 2017-08-25 | Resolved: 2024-01-10

## 2024-01-10 PROBLEM — E66.812 OBESITY, CLASS II, BMI 35-39.9: Status: ACTIVE | Noted: 2024-01-10

## 2024-01-10 PROBLEM — E66.812 CLASS 2 SEVERE OBESITY DUE TO EXCESS CALORIES WITH SERIOUS COMORBIDITY AND BODY MASS INDEX (BMI) OF 38.0 TO 38.9 IN ADULT: Status: RESOLVED | Noted: 2023-11-14 | Resolved: 2024-01-10

## 2024-01-10 PROBLEM — H93.8X3 EAR FULLNESS, BILATERAL: Status: RESOLVED | Noted: 2018-07-11 | Resolved: 2024-01-10

## 2024-01-10 RX ORDER — PAROXETINE 10 MG/1
10 TABLET, FILM COATED ORAL EVERY MORNING
COMMUNITY
Start: 2023-12-25

## 2024-01-10 RX ORDER — OMEPRAZOLE 20 MG/1
20 CAPSULE, DELAYED RELEASE ORAL AS NEEDED
COMMUNITY

## 2024-01-10 NOTE — PROGRESS NOTES
[facility]  [address] [id number] [phone]  [address] [id number] [phone]  Dept: [phone]  1/10/2024      Ellie Saini.  [id number]  [id number]  1985  female      Chief Complaint of weight gain; unable to maintain weight loss    History of Present Illness:   Ellie is a 38 y.o. female who presents today for evaluation, education and consultation regarding weight loss surgery. The patient is interested in the gastric bypass.      Diet History:Ellie has been overweight for at least 14 years, has been 35 pounds or more overweight for at least 5 years, has been 100 pounds or more overweight for 0 or more years and started dieting at age 30.  The most weight Ellie lost was a small amount on reduced calorie and maintained the weight loss for a short period. Ellie describes her eating habits as snacker/grazer. Ellie Saini has tried Fasting, reduced calorie, exercising, and medications among others with success of losing up but in each instance regained the weight.    See dietician documentation for complete history.    Bariatric Surgery Evaluation: The patient is being seen for an initial visit for bariatric surgery evaluation.     Bariatric Co-morbidities:  sleep apnea, back pain, GERD, and edema    Patient Active Problem List   Diagnosis    Bilateral low back pain with right-sided sciatica    Intractable migraine without aura and without status migrainosus    Neck pain    General medical exam    Anxiety    Syncope    Hx of migraines    Visual changes    LOC (loss of consciousness)    Chronic constipation    Primary insomnia    Irritable bowel syndrome with constipation    Generalized abdominal pain    Nausea    Gastroparesis    LIUDMILA (obstructive sleep apnea)    Obesity, Class II, BMI 35-39.9    Dietary counseling    Pre-op evaluation    Edema    GERD  (gastroesophageal reflux disease)       Past Medical History:   Diagnosis Date    Acute bronchitis     Acute maxillary sinusitis     Acute otitis media     Acute pharyngitis     Allergic rhinitis     Breast lump     Cramp in limb     Cramp in lower limb - bilateral       Ear fullness, bilateral     Hormone replacement therapy (HRT)     H/O: hormone replacement (HRT)    Insomnia     Lymphadenopathy     Malaise     Other malaise    Migraine     Postoperative visit     Sleep apnea     Upper respiratory infection     Upper respiratory infection, acute     Urinary tract infection     Vertigo        Past Surgical History:   Procedure Laterality Date     SECTION      X 2    COLONOSCOPY  2008    COLONOSCOPY N/A 2019    Procedure: COLONOSCOPY;  Surgeon: Cong Devine DO;  Location: [facility] ENDOSCOPY;  Service: Gastroenterology    COLONOSCOPY N/A 10/11/2022    Procedure: COLONOSCOPY;  Surgeon: Jorge L Snyder MD;  Location: [facility] ENDOSCOPY;  Service: Gastroenterology;  Laterality: N/A;    DIAGNOSTIC LAPAROSCOPY  2014    ENDOSCOPY N/A 2019    Procedure: ESOPHAGOGASTRODUODENOSCOPY;  Surgeon: Cong Devine DO;  Location: [facility] ENDOSCOPY;  Service: Gastroenterology    ENDOSCOPY N/A 10/11/2022    Procedure: ESOPHAGOGASTRODUODENOSCOPY;  Surgeon: Jorge L Snyder MD;  Location: [facility] ENDOSCOPY;  Service: Gastroenterology;  Laterality: N/A;    LAPAROSCOPIC CHOLECYSTECTOMY  2007    PAP SMEAR  2009    PROCEDURE GENERIC CONVERTED  2013    REMOVE INTRAUTERINE DEVICE     TOTAL ABDOMINAL HYSTERECTOMY WITH SALPINGO OOPHORECTOMY  2014    UPPER GASTROINTESTINAL ENDOSCOPY  2019    Dr. Devine       Allergies   Allergen Reactions    Gluten Meal Anaphylaxis    Hydrocodone GI Intolerance    Other Other (See Comments)     Seasonal Allergies    Shrimp Unknown - High Severity         Current Outpatient Medications:     baclofen (LIORESAL) 10 MG tablet, Take 1 tablet by mouth  3 (Three) Times a Day., Disp: 90 tablet, Rfl: 3    cetirizine (zyrTEC) 10 MG tablet, TAKE 1 TABLET BY MOUTH EVERY DAY, Disp: 30 tablet, Rfl: 5    estrogens, conjugated, (Premarin) 0.625 MG tablet, Take 1 tablet by mouth Daily. Daily, Disp: 90 tablet, Rfl: 3    Fremanezumab-vfrm (Ajovy) 225 MG/1.5ML solution auto-injector, Inject 225 mg under the skin into the appropriate area as directed Every 30 (Thirty) Days., Disp: 1.5 mL, Rfl: 11    hydroCHLOROthiazide (HYDRODIURIL) 25 MG tablet, Use 2-3 days a week prn, Disp: , Rfl:     hyoscyamine (LEVBID) 0.375 MG 12 hr tablet, TAKE 1 TABLET BY MOUTH EVERY 12 (TWELVE) HOURS AS NEEDED FOR CRAMPING., Disp: 60 tablet, Rfl: 5    meclizine (ANTIVERT) 25 MG tablet, Take 1 tablet by mouth 3 (Three) Times a Day As Needed for Dizziness (tid prn dizziness)., Disp: 30 tablet, Rfl: 11    meloxicam (Mobic) 15 MG tablet, Take 1 tablet by mouth Daily., Disp: 90 tablet, Rfl: 3    metFORMIN (Glucophage) 500 MG tablet, Take 1 tablet by mouth Daily With Breakfast., Disp: 30 tablet, Rfl: 11    metoclopramide (REGLAN) 5 MG tablet, TAKE 1 TABLET BY MOUTH TWICE A DAY, Disp: 60 tablet, Rfl: 5    omeprazole (priLOSEC) 20 MG capsule, Take 1 capsule by mouth As Needed., Disp: , Rfl:     ondansetron ODT (ZOFRAN-ODT) 4 MG disintegrating tablet, Place 1 tablet on the tongue Every 6 (Six) Hours As Needed for Nausea or Vomiting., Disp: 15 tablet, Rfl: 0    PARoxetine (PAXIL) 10 MG tablet, Take 1 tablet by mouth Every Morning., Disp: , Rfl:     Rimegepant Sulfate (Nurtec) 75 MG tablet dispersible tablet, Take 1 tablet by mouth As Needed (as needed for onset of migraine)., Disp: 8 tablet, Rfl: 11    tiZANidine (ZANAFLEX) 4 MG tablet, Take 1 tablet by mouth At Night As Needed for Muscle Spasms., Disp: 30 tablet, Rfl: 5    vitamin D (ERGOCALCIFEROL) 1.25 MG (16801 UT) capsule capsule, Take 1 capsule by mouth 1 (One) Time Per Week., Disp: 4 capsule, Rfl: 5    Social History     Socioeconomic History    Marital  status: Single    Number of children: 3    Highest education level: Some college, no degree   Tobacco Use    Smoking status: Never    Smokeless tobacco: Never   Vaping Use    Vaping Use: Never used   Substance and Sexual Activity    Alcohol use: No    Drug use: Never    Sexual activity: Defer       Family History   Problem Relation Age of Onset    Suicidality Father     Diabetes Other     Stroke Other          Review of Systems:  Review of Systems   Gastrointestinal:  Positive for nausea (related to gastroparesis).   Musculoskeletal:  Positive for back pain.   All other systems reviewed and are negative.      Physical Exam:  Vital Signs:  Weight: 76.2 kg (168 lb)   Body mass index is 37.01 kg/m².  Temp: 97.3 °F (36.3 °C)   Heart Rate: 78   BP: 137/99     Physical Exam  Vitals reviewed.   Constitutional:       Appearance: Normal appearance. She is well-developed. She is obese.   HENT:      Head: Normocephalic and atraumatic.   Cardiovascular:      Rate and Rhythm: Normal rate.   Pulmonary:      Effort: Pulmonary effort is normal.      Breath sounds: Normal breath sounds.   Abdominal:      General: Bowel sounds are normal. There is no distension.      Palpations: Abdomen is soft.      Tenderness: There is no abdominal tenderness.   Musculoskeletal:         General: Normal range of motion.   Skin:     General: Skin is warm and dry.   Neurological:      Mental Status: She is alert and oriented to person, place, and time.   Psychiatric:         Behavior: Behavior normal.         Thought Content: Thought content normal.         Judgment: Judgment normal.            Assessment:         Ellie Saini is a 38 y.o. year old female with medically complicated severe obesity. Weight: 76.2 kg (168 lb), Body mass index is 37.01 kg/m². and weight related problems including sleep apnea, back pain, GERD, and edema.    I explained in detail, potential surgical options of interest to the patient including the RNY gastric bypass,  sleeve gastrectomy, and gastric band while considering the patient's medical history. At this time, the patient expressed interest in the gastric bypass.  All of those procedures can be performed laparoscopically but there is a chance to convert to open if any technical challenges or complications do occur.  Bariatric surgery is not cosmetic surgery but rather a tool to help a patient make a life-long commitment lifestyle changes including diet, exercise, behavior changes, and taking supplemental vitamins and minerals. The risks, benefits, alternatives, and potential complications of all of the procedures were explained in detail including but not limited to failure to lose weight or gain weight and change in body image, metabolic complications. The patient was informed that surgery is a tool and requires lifestyle change including dietary modification to be successful. The patient was made aware of the need for vitamin supplementation after surgery due to the risk of deficiencies including but not limited to calcium, thiamine, vitamin B12, folate, iron, and anemia.    The patient was advised to start a high protein, low fat and low carbohydrate diet. The patient was given individualized information by our dietician along with handouts. The patient was encouraged to start routine exercise including but not limited to 150 minutes per week. The patient received a resistance band along with a handout of exercises.     The patient was given information regarding the ANN educational video. ANN is an internet based educational video which explains the surgical procedure and answers basic questions regarding the procedure. The patient was provided with instructions and a password to watch the video.    Due to the patient's BMI, history and co-morbidities they are at a high risk for surgery and will obtain the following:  -The patient has been advised that a letter of medical support and a history and physical must be  obtained from her primary care physician. The patient was given a copy of a sample form, that will suffice as their letter to take to their primary are provider.  -A referral for pre-operative psychological evaluation was ordered for the patient to evaluate candidacy as well as provide mental health support, should it be warranted before or after surgery.  -Pre-operative testing will be ordered to include: EKG. Previous results of testing were reviewed including CBC, CMP, TSH, A1C, B1, B6, B12, IRON, ZINC, FOLATE, COPPER, VITAMIN D, VITAMIN A, VITAMIN E, CXR, EGD and pathology. Patient did have sleep study in October- I cannot review testing but do see a follow up note with that provider where they prescribed her cpap therapy which she is using.  Will discuss with GEOVANNA if he would like to repeat her EGD.    The patient understands the surgical procedures and the different surgical options that are available.  She understands the lifestyle changes that would be required after surgery and has agreed to participate in a pre-operative and postoperative weight management program.  She also expressed understanding of possible risks, had several questions answered and desires to proceed.    I think she is a good candidate for this surgery, and is interested in a gastric bypass.    Encounter Diagnoses   Name Primary?    Obesity, Class II, BMI 35-39.9 Yes    Gastroesophageal reflux disease, unspecified whether esophagitis present     Gastroparesis     LIUDMILA (obstructive sleep apnea)     Pre-op evaluation        Plan:    The consultation plan was reviewed with the patient.  Patient will have evaluations and follow up with bariatric dieticians and a psychologist before undergoing a multidisciplinary review of her candidacy.  We also discussed the weight loss requirement and rationale, as well as other program requirements to ensure the safest approach to surgery. We spent time discussing different surgical procedures and plan of  care throughout their lifespan to ensure long term success in achieving and maintaining a healthier weight. Patient will proceed with preoperative lab work, radiology, and endoscopy after obtaining agreed upon clearances and letter of support from their primary care provider.     As this patient is a female of childbearing age, she was counseled regarding conception and pregnancy after surgery. Patient was advised that conception and pregnancy in the first 18 months post surgery is not advised due to increased metabolic demands required during pregnancy as well as increased risk of nutrient and vitamin deficiencies which could jeopardize fetal development and birth weight.     Total encounter exceeded 40+ minutes including reviewing their chart/outside medical records/previous visits, face to face time obtaining medical history and physical, reviewing surgical options and answering any questions, discussing pre-operative plan and requirements along with care coordination.         Lila Huerta, APRN  1/10/2024

## 2024-01-12 ENCOUNTER — PATIENT ROUNDING (BHMG ONLY) (OUTPATIENT)
Dept: BARIATRICS/WEIGHT MGMT | Facility: CLINIC | Age: 39
End: 2024-01-12
Payer: COMMERCIAL

## 2024-01-12 NOTE — PROGRESS NOTES
Good afternoon,    My name is Evonne Ndiaye, I am the Practice Manager for [facility].      I want to officially welcome you to our practice and ask about your recent visit.      If you could tell me about your recent visit with us. What things went well?      We're always looking for ways to make our patients experiences even better. Do you have recommendations on ways we may improve?      I appreciate you taking the time to answer a few questions today.      Thank you, and have a great day!

## 2024-01-30 ENCOUNTER — PREP FOR SURGERY (OUTPATIENT)
Dept: OTHER | Facility: HOSPITAL | Age: 39
End: 2024-01-30
Payer: COMMERCIAL

## 2024-01-30 DIAGNOSIS — E66.01 OBESITY, CLASS III, BMI 40-49.9 (MORBID OBESITY): Primary | ICD-10-CM

## 2024-01-30 DIAGNOSIS — K44.9 HIATAL HERNIA: ICD-10-CM

## 2024-01-30 PROBLEM — E66.813 OBESITY, CLASS III, BMI 40-49.9 (MORBID OBESITY): Status: ACTIVE | Noted: 2024-01-30

## 2024-01-30 RX ORDER — GABAPENTIN 300 MG/1
300 CAPSULE ORAL ONCE
OUTPATIENT
Start: 2024-01-30 | End: 2024-01-30

## 2024-01-30 RX ORDER — CHLORHEXIDINE GLUCONATE ORAL RINSE 1.2 MG/ML
15 SOLUTION DENTAL SEE ADMIN INSTRUCTIONS
OUTPATIENT
Start: 2024-01-30

## 2024-01-30 RX ORDER — SODIUM CHLORIDE 9 MG/ML
40 INJECTION, SOLUTION INTRAVENOUS AS NEEDED
OUTPATIENT
Start: 2024-01-30

## 2024-01-30 RX ORDER — METOCLOPRAMIDE HYDROCHLORIDE 5 MG/ML
10 INJECTION INTRAMUSCULAR; INTRAVENOUS ONCE
OUTPATIENT
Start: 2024-01-30 | End: 2024-01-30

## 2024-01-30 RX ORDER — SCOLOPAMINE TRANSDERMAL SYSTEM 1 MG/1
1 PATCH, EXTENDED RELEASE TRANSDERMAL CONTINUOUS
OUTPATIENT
Start: 2024-01-30 | End: 2024-02-02

## 2024-01-30 RX ORDER — SODIUM CHLORIDE 0.9 % (FLUSH) 0.9 %
3-10 SYRINGE (ML) INJECTION AS NEEDED
OUTPATIENT
Start: 2024-01-30

## 2024-01-30 RX ORDER — PANTOPRAZOLE SODIUM 40 MG/10ML
40 INJECTION, POWDER, LYOPHILIZED, FOR SOLUTION INTRAVENOUS ONCE
OUTPATIENT
Start: 2024-01-30 | End: 2024-01-30

## 2024-01-30 RX ORDER — SODIUM CHLORIDE 0.9 % (FLUSH) 0.9 %
3 SYRINGE (ML) INJECTION EVERY 12 HOURS SCHEDULED
OUTPATIENT
Start: 2024-01-30

## 2024-01-30 RX ORDER — PROMETHAZINE HYDROCHLORIDE 12.5 MG/1
12.5 TABLET ORAL ONCE
OUTPATIENT
Start: 2024-01-30 | End: 2024-01-30

## 2024-01-30 RX ORDER — CEFAZOLIN SODIUM IN 0.9 % NACL 3 G/100 ML
3 INTRAVENOUS SOLUTION, PIGGYBACK (ML) INTRAVENOUS ONCE
OUTPATIENT
Start: 2024-01-30 | End: 2024-01-30

## 2024-01-30 RX ORDER — SODIUM CHLORIDE, SODIUM LACTATE, POTASSIUM CHLORIDE, CALCIUM CHLORIDE 600; 310; 30; 20 MG/100ML; MG/100ML; MG/100ML; MG/100ML
100 INJECTION, SOLUTION INTRAVENOUS CONTINUOUS
OUTPATIENT
Start: 2024-01-30

## 2024-01-30 RX ORDER — PROMETHAZINE HYDROCHLORIDE 25 MG/1
25 SUPPOSITORY RECTAL ONCE
OUTPATIENT
Start: 2024-01-30

## 2024-02-01 ENCOUNTER — LAB (OUTPATIENT)
Dept: LAB | Facility: HOSPITAL | Age: 39
End: 2024-02-01
Payer: COMMERCIAL

## 2024-02-01 ENCOUNTER — CONSULT (OUTPATIENT)
Dept: BARIATRICS/WEIGHT MGMT | Facility: CLINIC | Age: 39
End: 2024-02-01
Payer: COMMERCIAL

## 2024-02-01 VITALS
WEIGHT: 162 LBS | SYSTOLIC BLOOD PRESSURE: 129 MMHG | BODY MASS INDEX: 36.44 KG/M2 | HEART RATE: 80 BPM | DIASTOLIC BLOOD PRESSURE: 80 MMHG | TEMPERATURE: 97.6 F | HEIGHT: 56 IN

## 2024-02-01 DIAGNOSIS — K44.9 HIATAL HERNIA: ICD-10-CM

## 2024-02-01 DIAGNOSIS — K58.1 IRRITABLE BOWEL SYNDROME WITH CONSTIPATION: ICD-10-CM

## 2024-02-01 DIAGNOSIS — K21.9 GASTROESOPHAGEAL REFLUX DISEASE, UNSPECIFIED WHETHER ESOPHAGITIS PRESENT: ICD-10-CM

## 2024-02-01 DIAGNOSIS — Z71.3 DIETARY COUNSELING: ICD-10-CM

## 2024-02-01 DIAGNOSIS — G47.33 OSA (OBSTRUCTIVE SLEEP APNEA): ICD-10-CM

## 2024-02-01 DIAGNOSIS — E66.9 OBESITY, CLASS II, BMI 35-39.9: Primary | ICD-10-CM

## 2024-02-01 DIAGNOSIS — E66.01 OBESITY, CLASS III, BMI 40-49.9 (MORBID OBESITY): ICD-10-CM

## 2024-02-01 DIAGNOSIS — M54.41 BILATERAL LOW BACK PAIN WITH RIGHT-SIDED SCIATICA, UNSPECIFIED CHRONICITY: ICD-10-CM

## 2024-02-01 DIAGNOSIS — K31.84 GASTROPARESIS: ICD-10-CM

## 2024-02-01 PROBLEM — Z01.818 PRE-OP EVALUATION: Status: RESOLVED | Noted: 2024-01-10 | Resolved: 2024-02-01

## 2024-02-01 PROBLEM — E66.813 OBESITY, CLASS III, BMI 40-49.9 (MORBID OBESITY): Status: RESOLVED | Noted: 2024-01-30 | Resolved: 2024-02-01

## 2024-02-01 LAB
ALBUMIN SERPL-MCNC: 4.7 G/DL (ref 3.5–5.2)
ALBUMIN/GLOB SERPL: 1.8 G/DL
ALP SERPL-CCNC: 46 U/L (ref 39–117)
ALT SERPL W P-5'-P-CCNC: 45 U/L (ref 1–33)
ANION GAP SERPL CALCULATED.3IONS-SCNC: 15 MMOL/L (ref 5–15)
AST SERPL-CCNC: 32 U/L (ref 1–32)
BILIRUB SERPL-MCNC: 0.3 MG/DL (ref 0–1.2)
BUN SERPL-MCNC: 20 MG/DL (ref 6–20)
BUN/CREAT SERPL: 31.7 (ref 7–25)
CALCIUM SPEC-SCNC: 9.7 MG/DL (ref 8.6–10.5)
CHLORIDE SERPL-SCNC: 99 MMOL/L (ref 98–107)
CO2 SERPL-SCNC: 22 MMOL/L (ref 22–29)
CREAT SERPL-MCNC: 0.63 MG/DL (ref 0.57–1)
DEPRECATED RDW RBC AUTO: 39.1 FL (ref 37–54)
EGFRCR SERPLBLD CKD-EPI 2021: 116.6 ML/MIN/1.73
ERYTHROCYTE [DISTWIDTH] IN BLOOD BY AUTOMATED COUNT: 12.6 % (ref 12.3–15.4)
GLOBULIN UR ELPH-MCNC: 2.6 GM/DL
GLUCOSE SERPL-MCNC: 84 MG/DL (ref 65–99)
HCT VFR BLD AUTO: 42 % (ref 34–46.6)
HGB BLD-MCNC: 14.3 G/DL (ref 12–15.9)
MCH RBC QN AUTO: 29.4 PG (ref 26.6–33)
MCHC RBC AUTO-ENTMCNC: 34 G/DL (ref 31.5–35.7)
MCV RBC AUTO: 86.2 FL (ref 79–97)
PLATELET # BLD AUTO: 378 10*3/MM3 (ref 140–450)
PMV BLD AUTO: 9.9 FL (ref 6–12)
POTASSIUM SERPL-SCNC: 4.1 MMOL/L (ref 3.5–5.2)
PROT SERPL-MCNC: 7.3 G/DL (ref 6–8.5)
RBC # BLD AUTO: 4.87 10*6/MM3 (ref 3.77–5.28)
SODIUM SERPL-SCNC: 136 MMOL/L (ref 136–145)
WBC NRBC COR # BLD AUTO: 7.55 10*3/MM3 (ref 3.4–10.8)

## 2024-02-01 PROCEDURE — 80053 COMPREHEN METABOLIC PANEL: CPT

## 2024-02-01 PROCEDURE — 36415 COLL VENOUS BLD VENIPUNCTURE: CPT

## 2024-02-01 PROCEDURE — 85027 COMPLETE CBC AUTOMATED: CPT

## 2024-02-01 RX ORDER — ENOXAPARIN SODIUM 100 MG/ML
40 INJECTION SUBCUTANEOUS
Qty: 5.6 ML | Refills: 0 | Status: SHIPPED | OUTPATIENT
Start: 2024-02-01 | End: 2024-02-15

## 2024-02-01 NOTE — H&P
Bariatric Consult:  Referred by Linda Barton APRN    Ellie Saini is here today for consult on Consult (Consult sleeve)      History of Present Illness:     Ellie Saini is a 38 y.o. female with morbid obesity with co-morbidities including sleep apnea, diabetes, and GERD who presents for surgical consultation for the above procedure. Ellie has completed the initial intake visit and has been examined by our nurse practitioner, dietician, psychologist and underwent the extensive educational teaching process under the guidance of our bariatric coordinator and myself. Ellie also has seen or if has not yet will see the educational video ANN on the surgical procedure if available. Ellie attended today more educational teaching from our bariatric coordinator and myself. Ellie has had an extensive medical workup including a visit with their primary care physician, EKG, chest radiograph, blood work, EGD or UGI and possibly further testing. These have been reviewed by me and discussed with the patient. Ellie is now ready to proceed with surgery. Ellie presently denies nausea, vomiting, fever, chills, chest pain, shortness of air, melena, hematochezia, hemetemesis, dysuria, frequency, hematuria.     Past Medical History:   Diagnosis Date    Acute bronchitis     Acute maxillary sinusitis     Acute otitis media     Acute pharyngitis     Allergic rhinitis     Breast lump     Cramp in limb     Cramp in lower limb - bilateral       Ear fullness, bilateral     Hormone replacement therapy (HRT)     H/O: hormone replacement (HRT)    Insomnia     Lymphadenopathy     Malaise     Other malaise    Migraine     Postoperative visit     Sleep apnea     Upper respiratory infection     Upper respiratory infection, acute     Urinary tract infection     Vertigo        Encounter Diagnoses   Name Primary?    Obesity, Class II, BMI 35-39.9 Yes    LIUDMILA (obstructive sleep apnea)     Bilateral low back pain with  right-sided sciatica, unspecified chronicity     Hiatal hernia     Gastroesophageal reflux disease, unspecified whether esophagitis present     Irritable bowel syndrome with constipation     Dietary counseling     Gastroparesis        Past Surgical History:   Procedure Laterality Date     SECTION      X 2    COLONOSCOPY  2008    COLONOSCOPY N/A 2019    Procedure: COLONOSCOPY;  Surgeon: Cong Devine DO;  Location: [facility] ENDOSCOPY;  Service: Gastroenterology    COLONOSCOPY N/A 10/11/2022    Procedure: COLONOSCOPY;  Surgeon: Jorge L Snyder MD;  Location: [facility] ENDOSCOPY;  Service: Gastroenterology;  Laterality: N/A;    DIAGNOSTIC LAPAROSCOPY  2014    ENDOSCOPY N/A 2019    Procedure: ESOPHAGOGASTRODUODENOSCOPY;  Surgeon: Cong Devine DO;  Location: [facility] ENDOSCOPY;  Service: Gastroenterology    ENDOSCOPY N/A 10/11/2022    Procedure: ESOPHAGOGASTRODUODENOSCOPY;  Surgeon: Jorge L Snyder MD;  Location: [facility] ENDOSCOPY;  Service: Gastroenterology;  Laterality: N/A;    LAPAROSCOPIC CHOLECYSTECTOMY  2007    PAP SMEAR  2009    PROCEDURE GENERIC CONVERTED  2013    REMOVE INTRAUTERINE DEVICE     TOTAL ABDOMINAL HYSTERECTOMY WITH SALPINGO OOPHORECTOMY  2014    UPPER GASTROINTESTINAL ENDOSCOPY  2019    Dr. Devine         * No active hospital problems. *      Allergies   Allergen Reactions    Gluten Meal Anaphylaxis    Hydrocodone GI Intolerance    Other Other (See Comments)     Seasonal Allergies    Shrimp Unknown - High Severity         Current Outpatient Medications:     baclofen (LIORESAL) 10 MG tablet, Take 1 tablet by mouth 3 (Three) Times a Day., Disp: 90 tablet, Rfl: 3    cetirizine (zyrTEC) 10 MG tablet, TAKE 1 TABLET BY MOUTH EVERY DAY, Disp: 30 tablet, Rfl: 5    estrogens, conjugated, (Premarin) 0.625 MG tablet, Take 1 tablet by mouth Daily. Daily, Disp: 90 tablet, Rfl: 3    Fremanezumab-vfrm (Ajovy) 225 MG/1.5ML solution auto-injector,  Inject 225 mg under the skin into the appropriate area as directed Every 30 (Thirty) Days., Disp: 1.5 mL, Rfl: 11    hydroCHLOROthiazide (HYDRODIURIL) 25 MG tablet, Use 2-3 days a week prn, Disp: , Rfl:     hyoscyamine (LEVBID) 0.375 MG 12 hr tablet, TAKE 1 TABLET BY MOUTH EVERY 12 (TWELVE) HOURS AS NEEDED FOR CRAMPING., Disp: 60 tablet, Rfl: 5    meclizine (ANTIVERT) 25 MG tablet, Take 1 tablet by mouth 3 (Three) Times a Day As Needed for Dizziness (tid prn dizziness)., Disp: 30 tablet, Rfl: 11    metFORMIN (Glucophage) 500 MG tablet, Take 1 tablet by mouth Daily With Breakfast., Disp: 30 tablet, Rfl: 11    metoclopramide (REGLAN) 5 MG tablet, TAKE 1 TABLET BY MOUTH TWICE A DAY, Disp: 60 tablet, Rfl: 5    omeprazole (priLOSEC) 20 MG capsule, Take 1 capsule by mouth As Needed., Disp: , Rfl:     ondansetron ODT (ZOFRAN-ODT) 4 MG disintegrating tablet, Place 1 tablet on the tongue Every 6 (Six) Hours As Needed for Nausea or Vomiting., Disp: 15 tablet, Rfl: 0    PARoxetine (PAXIL) 10 MG tablet, Take 1 tablet by mouth Every Morning., Disp: , Rfl:     Rimegepant Sulfate (Nurtec) 75 MG tablet dispersible tablet, Take 1 tablet by mouth As Needed (as needed for onset of migraine)., Disp: 8 tablet, Rfl: 11    tiZANidine (ZANAFLEX) 4 MG tablet, Take 1 tablet by mouth At Night As Needed for Muscle Spasms., Disp: 30 tablet, Rfl: 5    vitamin D (ERGOCALCIFEROL) 1.25 MG (24207 UT) capsule capsule, Take 1 capsule by mouth 1 (One) Time Per Week., Disp: 4 capsule, Rfl: 5    Enoxaparin Sodium (LOVENOX) 40 MG/0.4ML solution prefilled syringe syringe, Inject 0.4 mL under the skin into the appropriate area as directed Daily for 14 days. Start after surgery unless instructed otherwise, Disp: 5.6 mL, Rfl: 0    folic acid-vit B6-vit B12 (FOLBEE) 2.5-25-1 MG tablet tablet, Take 1 tablet by mouth Daily., Disp: 40 tablet, Rfl: 0    Social History     Socioeconomic History    Marital status: Single    Number of children: 3    Highest education  level: Some college, no degree   Tobacco Use    Smoking status: Never    Smokeless tobacco: Never   Vaping Use    Vaping Use: Never used   Substance and Sexual Activity    Alcohol use: No    Drug use: Never    Sexual activity: Defer       Family History   Problem Relation Age of Onset    Suicidality Father     Diabetes Other     Stroke Other        Review of Systems:  Review of Systems   Constitutional:  Positive for fatigue.   Gastrointestinal:  Positive for constipation and diarrhea.   Musculoskeletal:  Positive for arthralgias and back pain.   All other systems reviewed and are negative.      Physical Exam:  Vital Signs:  Weight: 73.5 kg (162 lb)   Body mass index is 35.68 kg/m².  Temp: 97.6 °F (36.4 °C)   Heart Rate: 80   BP: 129/80     Physical Exam  Vitals reviewed.   HENT:      Head: Normocephalic and atraumatic.      Mouth/Throat:      Mouth: Mucous membranes are moist.      Pharynx: Oropharynx is clear.   Eyes:      General: No scleral icterus.     Extraocular Movements: Extraocular movements intact.      Conjunctiva/sclera: Conjunctivae normal.      Pupils: Pupils are equal, round, and reactive to light.   Neck:      Thyroid: No thyromegaly.   Cardiovascular:      Rate and Rhythm: Normal rate.   Pulmonary:      Effort: Pulmonary effort is normal. No respiratory distress.      Breath sounds: Normal breath sounds. No stridor. No wheezing or rhonchi.   Abdominal:      General: Bowel sounds are normal.      Palpations: Abdomen is soft.      Tenderness: There is no abdominal tenderness. There is no right CVA tenderness, left CVA tenderness, guarding or rebound.      Hernia: No hernia is present.   Musculoskeletal:         General: Normal range of motion.      Cervical back: Normal range of motion and neck supple.   Lymphadenopathy:      Cervical: No cervical adenopathy.   Skin:     General: Skin is warm and dry.      Findings: No erythema.   Neurological:      Mental Status: She is alert and oriented to person,  place, and time.   Psychiatric:         Mood and Affect: Mood normal.         Behavior: Behavior normal.         Thought Content: Thought content normal.         Judgment: Judgment normal.         Assessment:    Ellie Saini is a 38 y.o. year old female with medically complicated severe obesity with a BMI of Body mass index is 35.68 kg/m². and multiple co-morbidities listed in the encounter diagnosis.    I think she is an appropriate candidate for this surgery, and is ready to proceed.    Plan/Discussion/Summary:  Medium size hiatal hernia per outside EGD.  Patient does take PPI.  H. pylori negative.    The patient has returned to the office for a surgical consultation and has requested to proceed with gastric sleeve.  I have had the opportunity to obtain a history, examine the patient and review the patient's chart. The procedure could be done laparoscopically and or robotically.    The patient understands that surgery is a tool and that weight loss is not guaranteed but only seen in the context of appropriate use, regular follow up, exercise and making appropriate food choices.     I personally discussed the potential complications of the laparoscopic gastric sleeve with this patient.  The patient is well aware of potential complications of the surgery that include but not limited to bleeding, infections, deep vein thrombosis, pulmonary embolism, pulmonary complications such as pneumonia, cardiac event, hernias, small bowel obstruction, damage to the spleen or other organs, bowel injury, disfiguring scars, failure to lose weight, need for additional surgery, conversion to an open procedure and death.  The patient is also aware of complications which apply in particular to the gastric sleeve and can include but not limited to the leakage of gastric contents at the staple line, the development of an intra-abdominal abscess, gastroesophageal reflux disease, Owens's esophagus, ulcers, vitamin/mineral  deficiencies, strictures, and the possibility of converting this procedure to a Camille-en-Y gastric bypass. The patient also understands the possibility of requiring an acid reducer medication for the rest of their life.    The risks, benefits, potential complications and alternative therapies were discussed at great length as outlined in our extensive consent forms, online consent and educational teaching processes.    The patient has confirmed the participation in the programs extensive educational activities.    All questions and concerns were answered to patient's satisfaction.  The patient now wishes to proceed with surgery.     The patient has agreed to a postoperative course of anitcoagulant therapy.      I instructed patient that the surgery could be laparoscopically and/or robotically.    I instructed patient to start on a H2 blocker or proton pump inhibitor if not already on one of these medications.    I explained in detail the procedures that are in the consent.  All of these procedures have a chance to convert to open if any technical challenges or complications do occur.  Bariatric surgery is not cosmetic surgery but rather a tool to help a patient make a life-long commitment lifestyle change including diet, exercise, behavior changes, and taking supplemental vitamins and minerals.    Problems after surgery may require more operations to correct them.    The risks, benefits, alternatives, and potential complications of all of the procedures were explained in detail including, but not limited to death, anesthesia and medication adverse effect, deep venous thrombosis, pulmonary embolism, trocar site/incisional hernia, wound infection, abdominal infection, bleeding, failure to lose weight, gain weight, a change in body image, metabolic complications with vitamin deficiences and anemia.    Weight loss expectations were discussed with the patient in detail. The weight loss operations most commonly performed  are the sleeve gastrectomy and the Camille-en-Y gastric bypass. These operations result in weight losses up to approximately 25-35% of initial body weight 12 to 24 months after surgery with the gastric bypass usually the higher percent of weight loss but depends on patient using the tool.    For the gastric bypass and loop duodenal switch (ALPA-S) the risks include but not limited to the following early complications:  Anastomotic leak/peritonitis, Camille/Alimentary/biliopancreatic limb obstruction, severe & minor wound infection/seroma, and nausea/vomiting.  Late complications can include but are not limited to malnutrition, vitamin deficiencies, frequent loose stools,  stomal stenosis, marginal ulcer, bowel obstruction, intussusception, internal, and incisional hernia.    Regarding the gastric sleeve, there is higher risk of dysphagia and reflux leading to possible Owens's esophagus compared to a gastric bypass, as well as risk of internal visceral/organ injury, splenectomy, bleeding, infection, leak (which could require further intervention possible conversion to Camille-en-Y gastric bypass), stenosis and possibility of regaining weight.    Ellie was counseled regarding diagnostic results, instructions for management, risk factor reductions, prognosis, patient and family education, impressions, risks and benefits of treatment options and importance of compliance with treatment. Total time of the encounter was over 45 minutes counseling the patient regarding the procedure as above and reviewing as well as ordering labs, medications and the procedure.  The chart was also reviewed prior to seeing the patient reviewing previous testing, studies and labs.    Ellie understands the surgical procedures and the different surgical options that are available.  She understands the lifestyle changes that are required after surgery and has agreed to follow the guidelines outlined in the weight management program.  She also  expressed understanding of the risks involved and had all of female questions answered and desires to proceed.      Dennis Gleason MD  2/1/2024

## 2024-02-01 NOTE — PATIENT INSTRUCTIONS
Bariatric Manual    You were provided a manual specific to the procedure that you have chosen.  Please refer to that with any questions or call the office at [phone]

## 2024-02-01 NOTE — H&P (VIEW-ONLY)
Bariatric Consult:  Referred by Linda Barton APRN    Ellie Saini is here today for consult on Consult (Consult sleeve)      History of Present Illness:     Ellie Saini is a 38 y.o. female with morbid obesity with co-morbidities including sleep apnea, diabetes, and GERD who presents for surgical consultation for the above procedure. Ellie has completed the initial intake visit and has been examined by our nurse practitioner, dietician, psychologist and underwent the extensive educational teaching process under the guidance of our bariatric coordinator and myself. Ellie also has seen or if has not yet will see the educational video ANN on the surgical procedure if available. Ellie attended today more educational teaching from our bariatric coordinator and myself. Ellie has had an extensive medical workup including a visit with their primary care physician, EKG, chest radiograph, blood work, EGD or UGI and possibly further testing. These have been reviewed by me and discussed with the patient. Ellie is now ready to proceed with surgery. Ellie presently denies nausea, vomiting, fever, chills, chest pain, shortness of air, melena, hematochezia, hemetemesis, dysuria, frequency, hematuria.     Past Medical History:   Diagnosis Date    Acute bronchitis     Acute maxillary sinusitis     Acute otitis media     Acute pharyngitis     Allergic rhinitis     Breast lump     Cramp in limb     Cramp in lower limb - bilateral       Ear fullness, bilateral     Hormone replacement therapy (HRT)     H/O: hormone replacement (HRT)    Insomnia     Lymphadenopathy     Malaise     Other malaise    Migraine     Postoperative visit     Sleep apnea     Upper respiratory infection     Upper respiratory infection, acute     Urinary tract infection     Vertigo        Encounter Diagnoses   Name Primary?    Obesity, Class II, BMI 35-39.9 Yes    LIUDMILA (obstructive sleep apnea)     Bilateral low back pain with  right-sided sciatica, unspecified chronicity     Hiatal hernia     Gastroesophageal reflux disease, unspecified whether esophagitis present     Irritable bowel syndrome with constipation     Dietary counseling     Gastroparesis        Past Surgical History:   Procedure Laterality Date     SECTION      X 2    COLONOSCOPY  2008    COLONOSCOPY N/A 2019    Procedure: COLONOSCOPY;  Surgeon: Cong Devine DO;  Location: [facility] ENDOSCOPY;  Service: Gastroenterology    COLONOSCOPY N/A 10/11/2022    Procedure: COLONOSCOPY;  Surgeon: Jorge L Snyder MD;  Location: [facility] ENDOSCOPY;  Service: Gastroenterology;  Laterality: N/A;    DIAGNOSTIC LAPAROSCOPY  2014    ENDOSCOPY N/A 2019    Procedure: ESOPHAGOGASTRODUODENOSCOPY;  Surgeon: Cong Devine DO;  Location: [facility] ENDOSCOPY;  Service: Gastroenterology    ENDOSCOPY N/A 10/11/2022    Procedure: ESOPHAGOGASTRODUODENOSCOPY;  Surgeon: Jorge L Snyder MD;  Location: [facility] ENDOSCOPY;  Service: Gastroenterology;  Laterality: N/A;    LAPAROSCOPIC CHOLECYSTECTOMY  2007    PAP SMEAR  2009    PROCEDURE GENERIC CONVERTED  2013    REMOVE INTRAUTERINE DEVICE     TOTAL ABDOMINAL HYSTERECTOMY WITH SALPINGO OOPHORECTOMY  2014    UPPER GASTROINTESTINAL ENDOSCOPY  2019    Dr. Devine         * No active hospital problems. *      Allergies   Allergen Reactions    Gluten Meal Anaphylaxis    Hydrocodone GI Intolerance    Other Other (See Comments)     Seasonal Allergies    Shrimp Unknown - High Severity         Current Outpatient Medications:     baclofen (LIORESAL) 10 MG tablet, Take 1 tablet by mouth 3 (Three) Times a Day., Disp: 90 tablet, Rfl: 3    cetirizine (zyrTEC) 10 MG tablet, TAKE 1 TABLET BY MOUTH EVERY DAY, Disp: 30 tablet, Rfl: 5    estrogens, conjugated, (Premarin) 0.625 MG tablet, Take 1 tablet by mouth Daily. Daily, Disp: 90 tablet, Rfl: 3    Fremanezumab-vfrm (Ajovy) 225 MG/1.5ML solution auto-injector,  Inject 225 mg under the skin into the appropriate area as directed Every 30 (Thirty) Days., Disp: 1.5 mL, Rfl: 11    hydroCHLOROthiazide (HYDRODIURIL) 25 MG tablet, Use 2-3 days a week prn, Disp: , Rfl:     hyoscyamine (LEVBID) 0.375 MG 12 hr tablet, TAKE 1 TABLET BY MOUTH EVERY 12 (TWELVE) HOURS AS NEEDED FOR CRAMPING., Disp: 60 tablet, Rfl: 5    meclizine (ANTIVERT) 25 MG tablet, Take 1 tablet by mouth 3 (Three) Times a Day As Needed for Dizziness (tid prn dizziness)., Disp: 30 tablet, Rfl: 11    metFORMIN (Glucophage) 500 MG tablet, Take 1 tablet by mouth Daily With Breakfast., Disp: 30 tablet, Rfl: 11    metoclopramide (REGLAN) 5 MG tablet, TAKE 1 TABLET BY MOUTH TWICE A DAY, Disp: 60 tablet, Rfl: 5    omeprazole (priLOSEC) 20 MG capsule, Take 1 capsule by mouth As Needed., Disp: , Rfl:     ondansetron ODT (ZOFRAN-ODT) 4 MG disintegrating tablet, Place 1 tablet on the tongue Every 6 (Six) Hours As Needed for Nausea or Vomiting., Disp: 15 tablet, Rfl: 0    PARoxetine (PAXIL) 10 MG tablet, Take 1 tablet by mouth Every Morning., Disp: , Rfl:     Rimegepant Sulfate (Nurtec) 75 MG tablet dispersible tablet, Take 1 tablet by mouth As Needed (as needed for onset of migraine)., Disp: 8 tablet, Rfl: 11    tiZANidine (ZANAFLEX) 4 MG tablet, Take 1 tablet by mouth At Night As Needed for Muscle Spasms., Disp: 30 tablet, Rfl: 5    vitamin D (ERGOCALCIFEROL) 1.25 MG (30461 UT) capsule capsule, Take 1 capsule by mouth 1 (One) Time Per Week., Disp: 4 capsule, Rfl: 5    Enoxaparin Sodium (LOVENOX) 40 MG/0.4ML solution prefilled syringe syringe, Inject 0.4 mL under the skin into the appropriate area as directed Daily for 14 days. Start after surgery unless instructed otherwise, Disp: 5.6 mL, Rfl: 0    folic acid-vit B6-vit B12 (FOLBEE) 2.5-25-1 MG tablet tablet, Take 1 tablet by mouth Daily., Disp: 40 tablet, Rfl: 0    Social History     Socioeconomic History    Marital status: Single    Number of children: 3    Highest education  level: Some college, no degree   Tobacco Use    Smoking status: Never    Smokeless tobacco: Never   Vaping Use    Vaping Use: Never used   Substance and Sexual Activity    Alcohol use: No    Drug use: Never    Sexual activity: Defer       Family History   Problem Relation Age of Onset    Suicidality Father     Diabetes Other     Stroke Other        Review of Systems:  Review of Systems   Constitutional:  Positive for fatigue.   Gastrointestinal:  Positive for constipation and diarrhea.   Musculoskeletal:  Positive for arthralgias and back pain.   All other systems reviewed and are negative.      Physical Exam:  Vital Signs:  Weight: 73.5 kg (162 lb)   Body mass index is 35.68 kg/m².  Temp: 97.6 °F (36.4 °C)   Heart Rate: 80   BP: 129/80     Physical Exam  Vitals reviewed.   HENT:      Head: Normocephalic and atraumatic.      Mouth/Throat:      Mouth: Mucous membranes are moist.      Pharynx: Oropharynx is clear.   Eyes:      General: No scleral icterus.     Extraocular Movements: Extraocular movements intact.      Conjunctiva/sclera: Conjunctivae normal.      Pupils: Pupils are equal, round, and reactive to light.   Neck:      Thyroid: No thyromegaly.   Cardiovascular:      Rate and Rhythm: Normal rate.   Pulmonary:      Effort: Pulmonary effort is normal. No respiratory distress.      Breath sounds: Normal breath sounds. No stridor. No wheezing or rhonchi.   Abdominal:      General: Bowel sounds are normal.      Palpations: Abdomen is soft.      Tenderness: There is no abdominal tenderness. There is no right CVA tenderness, left CVA tenderness, guarding or rebound.      Hernia: No hernia is present.   Musculoskeletal:         General: Normal range of motion.      Cervical back: Normal range of motion and neck supple.   Lymphadenopathy:      Cervical: No cervical adenopathy.   Skin:     General: Skin is warm and dry.      Findings: No erythema.   Neurological:      Mental Status: She is alert and oriented to person,  place, and time.   Psychiatric:         Mood and Affect: Mood normal.         Behavior: Behavior normal.         Thought Content: Thought content normal.         Judgment: Judgment normal.         Assessment:    Ellie Saini is a 38 y.o. year old female with medically complicated severe obesity with a BMI of Body mass index is 35.68 kg/m². and multiple co-morbidities listed in the encounter diagnosis.    I think she is an appropriate candidate for this surgery, and is ready to proceed.    Plan/Discussion/Summary:  Medium size hiatal hernia per outside EGD.  Patient does take PPI.  H. pylori negative.    The patient has returned to the office for a surgical consultation and has requested to proceed with gastric sleeve.  I have had the opportunity to obtain a history, examine the patient and review the patient's chart. The procedure could be done laparoscopically and or robotically.    The patient understands that surgery is a tool and that weight loss is not guaranteed but only seen in the context of appropriate use, regular follow up, exercise and making appropriate food choices.     I personally discussed the potential complications of the laparoscopic gastric sleeve with this patient.  The patient is well aware of potential complications of the surgery that include but not limited to bleeding, infections, deep vein thrombosis, pulmonary embolism, pulmonary complications such as pneumonia, cardiac event, hernias, small bowel obstruction, damage to the spleen or other organs, bowel injury, disfiguring scars, failure to lose weight, need for additional surgery, conversion to an open procedure and death.  The patient is also aware of complications which apply in particular to the gastric sleeve and can include but not limited to the leakage of gastric contents at the staple line, the development of an intra-abdominal abscess, gastroesophageal reflux disease, Owens's esophagus, ulcers, vitamin/mineral  deficiencies, strictures, and the possibility of converting this procedure to a Camille-en-Y gastric bypass. The patient also understands the possibility of requiring an acid reducer medication for the rest of their life.    The risks, benefits, potential complications and alternative therapies were discussed at great length as outlined in our extensive consent forms, online consent and educational teaching processes.    The patient has confirmed the participation in the programs extensive educational activities.    All questions and concerns were answered to patient's satisfaction.  The patient now wishes to proceed with surgery.     The patient has agreed to a postoperative course of anitcoagulant therapy.      I instructed patient that the surgery could be laparoscopically and/or robotically.    I instructed patient to start on a H2 blocker or proton pump inhibitor if not already on one of these medications.    I explained in detail the procedures that are in the consent.  All of these procedures have a chance to convert to open if any technical challenges or complications do occur.  Bariatric surgery is not cosmetic surgery but rather a tool to help a patient make a life-long commitment lifestyle change including diet, exercise, behavior changes, and taking supplemental vitamins and minerals.    Problems after surgery may require more operations to correct them.    The risks, benefits, alternatives, and potential complications of all of the procedures were explained in detail including, but not limited to death, anesthesia and medication adverse effect, deep venous thrombosis, pulmonary embolism, trocar site/incisional hernia, wound infection, abdominal infection, bleeding, failure to lose weight, gain weight, a change in body image, metabolic complications with vitamin deficiences and anemia.    Weight loss expectations were discussed with the patient in detail. The weight loss operations most commonly performed  are the sleeve gastrectomy and the Camille-en-Y gastric bypass. These operations result in weight losses up to approximately 25-35% of initial body weight 12 to 24 months after surgery with the gastric bypass usually the higher percent of weight loss but depends on patient using the tool.    For the gastric bypass and loop duodenal switch (ALPA-S) the risks include but not limited to the following early complications:  Anastomotic leak/peritonitis, Camille/Alimentary/biliopancreatic limb obstruction, severe & minor wound infection/seroma, and nausea/vomiting.  Late complications can include but are not limited to malnutrition, vitamin deficiencies, frequent loose stools,  stomal stenosis, marginal ulcer, bowel obstruction, intussusception, internal, and incisional hernia.    Regarding the gastric sleeve, there is higher risk of dysphagia and reflux leading to possible Owens's esophagus compared to a gastric bypass, as well as risk of internal visceral/organ injury, splenectomy, bleeding, infection, leak (which could require further intervention possible conversion to Camille-en-Y gastric bypass), stenosis and possibility of regaining weight.    Ellie was counseled regarding diagnostic results, instructions for management, risk factor reductions, prognosis, patient and family education, impressions, risks and benefits of treatment options and importance of compliance with treatment. Total time of the encounter was over 45 minutes counseling the patient regarding the procedure as above and reviewing as well as ordering labs, medications and the procedure.  The chart was also reviewed prior to seeing the patient reviewing previous testing, studies and labs.    Ellie understands the surgical procedures and the different surgical options that are available.  She understands the lifestyle changes that are required after surgery and has agreed to follow the guidelines outlined in the weight management program.  She also  expressed understanding of the risks involved and had all of female questions answered and desires to proceed.      Dennis Gleason MD  2/1/2024

## 2024-02-07 ENCOUNTER — ANESTHESIA (OUTPATIENT)
Dept: PERIOP | Facility: HOSPITAL | Age: 39
End: 2024-02-07
Payer: COMMERCIAL

## 2024-02-07 ENCOUNTER — ANESTHESIA EVENT (OUTPATIENT)
Dept: PERIOP | Facility: HOSPITAL | Age: 39
End: 2024-02-07
Payer: COMMERCIAL

## 2024-02-07 ENCOUNTER — HOSPITAL ENCOUNTER (INPATIENT)
Facility: HOSPITAL | Age: 39
LOS: 1 days | Discharge: HOME | End: 2024-02-07
Attending: SURGERY | Admitting: SURGERY
Payer: COMMERCIAL

## 2024-02-07 VITALS
WEIGHT: 163.36 LBS | BODY MASS INDEX: 35.24 KG/M2 | SYSTOLIC BLOOD PRESSURE: 111 MMHG | OXYGEN SATURATION: 92 % | RESPIRATION RATE: 16 BRPM | HEIGHT: 57 IN | HEART RATE: 81 BPM | TEMPERATURE: 97.4 F | DIASTOLIC BLOOD PRESSURE: 58 MMHG

## 2024-02-07 DIAGNOSIS — K59.09 CHRONIC CONSTIPATION: ICD-10-CM

## 2024-02-07 DIAGNOSIS — K44.9 HIATAL HERNIA: ICD-10-CM

## 2024-02-07 DIAGNOSIS — M54.2 NECK PAIN: ICD-10-CM

## 2024-02-07 DIAGNOSIS — R10.84 GENERALIZED ABDOMINAL PAIN: ICD-10-CM

## 2024-02-07 DIAGNOSIS — Z00.00 GENERAL MEDICAL EXAM: ICD-10-CM

## 2024-02-07 DIAGNOSIS — Z71.3 DIETARY COUNSELING: ICD-10-CM

## 2024-02-07 DIAGNOSIS — R40.20 LOC (LOSS OF CONSCIOUSNESS): ICD-10-CM

## 2024-02-07 DIAGNOSIS — E66.9 OBESITY, CLASS II, BMI 35-39.9: ICD-10-CM

## 2024-02-07 DIAGNOSIS — G47.33 OSA (OBSTRUCTIVE SLEEP APNEA): ICD-10-CM

## 2024-02-07 DIAGNOSIS — R60.0 LOCALIZED EDEMA: ICD-10-CM

## 2024-02-07 DIAGNOSIS — F51.01 PRIMARY INSOMNIA: ICD-10-CM

## 2024-02-07 DIAGNOSIS — Z86.69 HX OF MIGRAINES: ICD-10-CM

## 2024-02-07 DIAGNOSIS — K58.1 IRRITABLE BOWEL SYNDROME WITH CONSTIPATION: ICD-10-CM

## 2024-02-07 DIAGNOSIS — M54.41 BILATERAL LOW BACK PAIN WITH RIGHT-SIDED SCIATICA, UNSPECIFIED CHRONICITY: ICD-10-CM

## 2024-02-07 DIAGNOSIS — R11.0 NAUSEA: ICD-10-CM

## 2024-02-07 DIAGNOSIS — F41.9 ANXIETY: ICD-10-CM

## 2024-02-07 DIAGNOSIS — K31.84 GASTROPARESIS: ICD-10-CM

## 2024-02-07 DIAGNOSIS — E66.01 OBESITY, CLASS III, BMI 40-49.9 (MORBID OBESITY): ICD-10-CM

## 2024-02-07 DIAGNOSIS — K21.9 GASTROESOPHAGEAL REFLUX DISEASE, UNSPECIFIED WHETHER ESOPHAGITIS PRESENT: Primary | ICD-10-CM

## 2024-02-07 DIAGNOSIS — G43.019 INTRACTABLE MIGRAINE WITHOUT AURA AND WITHOUT STATUS MIGRAINOSUS: ICD-10-CM

## 2024-02-07 DIAGNOSIS — H53.9 VISUAL CHANGES: ICD-10-CM

## 2024-02-07 PROBLEM — E66.813 OBESITY, CLASS III, BMI 40-49.9 (MORBID OBESITY): Status: ACTIVE | Noted: 2024-02-07

## 2024-02-07 PROCEDURE — 88307 TISSUE EXAM BY PATHOLOGIST: CPT | Performed by: SURGERY

## 2024-02-07 PROCEDURE — 25010000002 ROPIVACAINE PER 1 MG: Performed by: SURGERY

## 2024-02-07 PROCEDURE — 25010000002 KETOROLAC TROMETHAMINE PER 15 MG: Performed by: SURGERY

## 2024-02-07 PROCEDURE — 25810000003 LACTATED RINGERS PER 1000 ML: Performed by: NURSE ANESTHETIST, CERTIFIED REGISTERED

## 2024-02-07 PROCEDURE — 25810000003 SODIUM CHLORIDE PER 500 ML: Performed by: SURGERY

## 2024-02-07 PROCEDURE — 25010000002 PROPOFOL 10 MG/ML EMULSION: Performed by: NURSE ANESTHETIST, CERTIFIED REGISTERED

## 2024-02-07 PROCEDURE — 25010000002 CEFAZOLIN IN DEXTROSE 2-4 GM/100ML-% SOLUTION: Performed by: SURGERY

## 2024-02-07 PROCEDURE — 25010000002 GLYCOPYRROLATE 0.2 MG/ML SOLUTION: Performed by: NURSE ANESTHETIST, CERTIFIED REGISTERED

## 2024-02-07 PROCEDURE — 25010000002 MAGNESIUM SULFATE PER 500 MG OF MAGNESIUM: Performed by: NURSE ANESTHETIST, CERTIFIED REGISTERED

## 2024-02-07 PROCEDURE — 25010000002 THIAMINE HCL 200 MG/2ML SOLUTION: Performed by: SURGERY

## 2024-02-07 PROCEDURE — 25010000002 FENTANYL CITRATE (PF) 50 MCG/ML SOLUTION: Performed by: NURSE ANESTHETIST, CERTIFIED REGISTERED

## 2024-02-07 PROCEDURE — 25010000002 METOCLOPRAMIDE PER 10 MG: Performed by: SURGERY

## 2024-02-07 PROCEDURE — 25010000002 CLONIDINE PER 1 MG: Performed by: SURGERY

## 2024-02-07 PROCEDURE — 25010000002 DEXAMETHASONE PER 1 MG: Performed by: NURSE ANESTHETIST, CERTIFIED REGISTERED

## 2024-02-07 PROCEDURE — 43775 LAP SLEEVE GASTRECTOMY: CPT | Performed by: SURGERY

## 2024-02-07 PROCEDURE — 25010000002 EPINEPHRINE 1 MG/ML SOLUTION 30 ML VIAL: Performed by: SURGERY

## 2024-02-07 PROCEDURE — 25010000002 ENOXAPARIN PER 10 MG: Performed by: SURGERY

## 2024-02-07 PROCEDURE — 25810000003 LACTATED RINGERS SOLUTION: Performed by: SURGERY

## 2024-02-07 PROCEDURE — 25010000002 CYANOCOBALAMIN PER 1000 MCG: Performed by: SURGERY

## 2024-02-07 PROCEDURE — 25010000002 MIDAZOLAM PER 1 MG: Performed by: STUDENT IN AN ORGANIZED HEALTH CARE EDUCATION/TRAINING PROGRAM

## 2024-02-07 PROCEDURE — 25010000002 AMISULPRIDE (ANTIEMETIC) 5 MG/2ML SOLUTION: Performed by: SURGERY

## 2024-02-07 PROCEDURE — 25010000002 SUGAMMADEX 200 MG/2ML SOLUTION: Performed by: NURSE ANESTHETIST, CERTIFIED REGISTERED

## 2024-02-07 PROCEDURE — 25010000002 ONDANSETRON PER 1 MG: Performed by: NURSE ANESTHETIST, CERTIFIED REGISTERED

## 2024-02-07 PROCEDURE — 0BQT4ZZ REPAIR DIAPHRAGM, PERCUTANEOUS ENDOSCOPIC APPROACH: ICD-10-PCS | Performed by: SURGERY

## 2024-02-07 PROCEDURE — 0DB64Z3 EXCISION OF STOMACH, PERCUTANEOUS ENDOSCOPIC APPROACH, VERTICAL: ICD-10-PCS | Performed by: SURGERY

## 2024-02-07 DEVICE — STPLR GASTRC/SLV TITAN/SGS NON/ARTICULR PWR 23CM 1/PU: Type: IMPLANTABLE DEVICE | Site: ABDOMEN | Status: FUNCTIONAL

## 2024-02-07 RX ORDER — METOCLOPRAMIDE HYDROCHLORIDE 5 MG/ML
10 INJECTION INTRAMUSCULAR; INTRAVENOUS ONCE
Status: COMPLETED | OUTPATIENT
Start: 2024-02-07 | End: 2024-02-07

## 2024-02-07 RX ORDER — PROMETHAZINE HYDROCHLORIDE 25 MG/1
12.5 TABLET ORAL ONCE
Status: DISCONTINUED | OUTPATIENT
Start: 2024-02-07 | End: 2024-02-07 | Stop reason: HOSPADM

## 2024-02-07 RX ORDER — MAGNESIUM HYDROXIDE 1200 MG/15ML
LIQUID ORAL AS NEEDED
Status: DISCONTINUED | OUTPATIENT
Start: 2024-02-07 | End: 2024-02-07 | Stop reason: HOSPADM

## 2024-02-07 RX ORDER — EPHEDRINE SULFATE 50 MG/ML
5 INJECTION, SOLUTION INTRAVENOUS ONCE AS NEEDED
Status: DISCONTINUED | OUTPATIENT
Start: 2024-02-07 | End: 2024-02-07 | Stop reason: HOSPADM

## 2024-02-07 RX ORDER — MAGNESIUM SULFATE HEPTAHYDRATE 500 MG/ML
INJECTION, SOLUTION INTRAMUSCULAR; INTRAVENOUS AS NEEDED
Status: DISCONTINUED | OUTPATIENT
Start: 2024-02-07 | End: 2024-02-07 | Stop reason: SURG

## 2024-02-07 RX ORDER — FENTANYL CITRATE 50 UG/ML
50 INJECTION, SOLUTION INTRAMUSCULAR; INTRAVENOUS
Status: DISCONTINUED | OUTPATIENT
Start: 2024-02-07 | End: 2024-02-07 | Stop reason: HOSPADM

## 2024-02-07 RX ORDER — FLUMAZENIL 0.1 MG/ML
0.2 INJECTION INTRAVENOUS AS NEEDED
Status: DISCONTINUED | OUTPATIENT
Start: 2024-02-07 | End: 2024-02-07 | Stop reason: HOSPADM

## 2024-02-07 RX ORDER — TRAMADOL HYDROCHLORIDE 50 MG/1
50 TABLET ORAL EVERY 6 HOURS PRN
Qty: 12 TABLET | Refills: 0 | Status: SHIPPED | OUTPATIENT
Start: 2024-02-07 | End: 2024-02-12 | Stop reason: SDUPTHER

## 2024-02-07 RX ORDER — PANTOPRAZOLE SODIUM 40 MG/10ML
40 INJECTION, POWDER, LYOPHILIZED, FOR SOLUTION INTRAVENOUS ONCE
Status: COMPLETED | OUTPATIENT
Start: 2024-02-07 | End: 2024-02-07

## 2024-02-07 RX ORDER — PROMETHAZINE HYDROCHLORIDE 25 MG/1
25 TABLET ORAL ONCE AS NEEDED
Status: DISCONTINUED | OUTPATIENT
Start: 2024-02-07 | End: 2024-02-07 | Stop reason: HOSPADM

## 2024-02-07 RX ORDER — ONDANSETRON 2 MG/ML
4 INJECTION INTRAMUSCULAR; INTRAVENOUS ONCE AS NEEDED
Status: DISCONTINUED | OUTPATIENT
Start: 2024-02-07 | End: 2024-02-07 | Stop reason: HOSPADM

## 2024-02-07 RX ORDER — SODIUM CHLORIDE, SODIUM LACTATE, POTASSIUM CHLORIDE, CALCIUM CHLORIDE 600; 310; 30; 20 MG/100ML; MG/100ML; MG/100ML; MG/100ML
9 INJECTION, SOLUTION INTRAVENOUS CONTINUOUS
Status: DISCONTINUED | OUTPATIENT
Start: 2024-02-07 | End: 2024-02-07 | Stop reason: HOSPADM

## 2024-02-07 RX ORDER — LIDOCAINE HYDROCHLORIDE 10 MG/ML
0.5 INJECTION, SOLUTION INFILTRATION; PERINEURAL ONCE AS NEEDED
Status: DISCONTINUED | OUTPATIENT
Start: 2024-02-07 | End: 2024-02-07 | Stop reason: HOSPADM

## 2024-02-07 RX ORDER — DIPHENHYDRAMINE HYDROCHLORIDE 50 MG/ML
12.5 INJECTION INTRAMUSCULAR; INTRAVENOUS
Status: DISCONTINUED | OUTPATIENT
Start: 2024-02-07 | End: 2024-02-07 | Stop reason: HOSPADM

## 2024-02-07 RX ORDER — HYDROCODONE BITARTRATE AND ACETAMINOPHEN 5; 325 MG/1; MG/1
1 TABLET ORAL ONCE AS NEEDED
Status: DISCONTINUED | OUTPATIENT
Start: 2024-02-07 | End: 2024-02-07 | Stop reason: HOSPADM

## 2024-02-07 RX ORDER — THIAMINE HYDROCHLORIDE 100 MG/ML
100 INJECTION, SOLUTION INTRAMUSCULAR; INTRAVENOUS ONCE
Status: COMPLETED | OUTPATIENT
Start: 2024-02-07 | End: 2024-02-07

## 2024-02-07 RX ORDER — SODIUM CHLORIDE, SODIUM LACTATE, POTASSIUM CHLORIDE, CALCIUM CHLORIDE 600; 310; 30; 20 MG/100ML; MG/100ML; MG/100ML; MG/100ML
100 INJECTION, SOLUTION INTRAVENOUS CONTINUOUS
Status: DISCONTINUED | OUTPATIENT
Start: 2024-02-07 | End: 2024-02-07 | Stop reason: HOSPADM

## 2024-02-07 RX ORDER — PHENYLEPHRINE HCL IN 0.9% NACL 1 MG/10 ML
SYRINGE (ML) INTRAVENOUS AS NEEDED
Status: DISCONTINUED | OUTPATIENT
Start: 2024-02-07 | End: 2024-02-07 | Stop reason: SURG

## 2024-02-07 RX ORDER — PROCHLORPERAZINE EDISYLATE 5 MG/ML
10 INJECTION INTRAMUSCULAR; INTRAVENOUS AS NEEDED
Status: DISCONTINUED | OUTPATIENT
Start: 2024-02-07 | End: 2024-02-07 | Stop reason: HOSPADM

## 2024-02-07 RX ORDER — ROCURONIUM BROMIDE 10 MG/ML
INJECTION, SOLUTION INTRAVENOUS AS NEEDED
Status: DISCONTINUED | OUTPATIENT
Start: 2024-02-07 | End: 2024-02-07 | Stop reason: SURG

## 2024-02-07 RX ORDER — DIPHENHYDRAMINE HYDROCHLORIDE 50 MG/ML
25 INJECTION INTRAMUSCULAR; INTRAVENOUS EVERY 4 HOURS PRN
Status: DISCONTINUED | OUTPATIENT
Start: 2024-02-07 | End: 2024-02-07 | Stop reason: HOSPADM

## 2024-02-07 RX ORDER — HYDRALAZINE HYDROCHLORIDE 20 MG/ML
5 INJECTION INTRAMUSCULAR; INTRAVENOUS
Status: DISCONTINUED | OUTPATIENT
Start: 2024-02-07 | End: 2024-02-07 | Stop reason: HOSPADM

## 2024-02-07 RX ORDER — ENOXAPARIN SODIUM 100 MG/ML
40 INJECTION SUBCUTANEOUS ONCE
Status: COMPLETED | OUTPATIENT
Start: 2024-02-07 | End: 2024-02-07

## 2024-02-07 RX ORDER — CEFAZOLIN SODIUM IN 0.9 % NACL 3 G/100 ML
3 INTRAVENOUS SOLUTION, PIGGYBACK (ML) INTRAVENOUS ONCE
Status: DISCONTINUED | OUTPATIENT
Start: 2024-02-07 | End: 2024-02-07

## 2024-02-07 RX ORDER — FENTANYL CITRATE 50 UG/ML
INJECTION, SOLUTION INTRAMUSCULAR; INTRAVENOUS AS NEEDED
Status: DISCONTINUED | OUTPATIENT
Start: 2024-02-07 | End: 2024-02-07 | Stop reason: SURG

## 2024-02-07 RX ORDER — ONDANSETRON 2 MG/ML
INJECTION INTRAMUSCULAR; INTRAVENOUS AS NEEDED
Status: DISCONTINUED | OUTPATIENT
Start: 2024-02-07 | End: 2024-02-07 | Stop reason: SURG

## 2024-02-07 RX ORDER — CEFAZOLIN SODIUM 2 G/100ML
2000 INJECTION, SOLUTION INTRAVENOUS ONCE
Status: COMPLETED | OUTPATIENT
Start: 2024-02-07 | End: 2024-02-07

## 2024-02-07 RX ORDER — SODIUM CHLORIDE, SODIUM LACTATE, POTASSIUM CHLORIDE, CALCIUM CHLORIDE 600; 310; 30; 20 MG/100ML; MG/100ML; MG/100ML; MG/100ML
INJECTION, SOLUTION INTRAVENOUS CONTINUOUS PRN
Status: DISCONTINUED | OUTPATIENT
Start: 2024-02-07 | End: 2024-02-07 | Stop reason: SURG

## 2024-02-07 RX ORDER — PROMETHAZINE HYDROCHLORIDE 25 MG/1
25 SUPPOSITORY RECTAL ONCE
Status: DISCONTINUED | OUTPATIENT
Start: 2024-02-07 | End: 2024-02-07 | Stop reason: HOSPADM

## 2024-02-07 RX ORDER — SODIUM CHLORIDE 0.9 % (FLUSH) 0.9 %
3-10 SYRINGE (ML) INJECTION AS NEEDED
Status: DISCONTINUED | OUTPATIENT
Start: 2024-02-07 | End: 2024-02-07 | Stop reason: HOSPADM

## 2024-02-07 RX ORDER — CHLORHEXIDINE GLUCONATE ORAL RINSE 1.2 MG/ML
15 SOLUTION DENTAL SEE ADMIN INSTRUCTIONS
Status: COMPLETED | OUTPATIENT
Start: 2024-02-07 | End: 2024-02-07

## 2024-02-07 RX ORDER — IPRATROPIUM BROMIDE AND ALBUTEROL SULFATE 2.5; .5 MG/3ML; MG/3ML
3 SOLUTION RESPIRATORY (INHALATION) ONCE AS NEEDED
Status: DISCONTINUED | OUTPATIENT
Start: 2024-02-07 | End: 2024-02-07 | Stop reason: HOSPADM

## 2024-02-07 RX ORDER — SODIUM CHLORIDE 9 MG/ML
INJECTION, SOLUTION INTRAVENOUS AS NEEDED
Status: DISCONTINUED | OUTPATIENT
Start: 2024-02-07 | End: 2024-02-07 | Stop reason: HOSPADM

## 2024-02-07 RX ORDER — GABAPENTIN 300 MG/1
300 CAPSULE ORAL ONCE
Status: COMPLETED | OUTPATIENT
Start: 2024-02-07 | End: 2024-02-07

## 2024-02-07 RX ORDER — NALOXONE HCL 0.4 MG/ML
0.2 VIAL (ML) INJECTION AS NEEDED
Status: DISCONTINUED | OUTPATIENT
Start: 2024-02-07 | End: 2024-02-07 | Stop reason: HOSPADM

## 2024-02-07 RX ORDER — DROPERIDOL 2.5 MG/ML
0.62 INJECTION, SOLUTION INTRAMUSCULAR; INTRAVENOUS
Status: DISCONTINUED | OUTPATIENT
Start: 2024-02-07 | End: 2024-02-07 | Stop reason: HOSPADM

## 2024-02-07 RX ORDER — OXYCODONE AND ACETAMINOPHEN 7.5; 325 MG/1; MG/1
1 TABLET ORAL EVERY 4 HOURS PRN
Status: DISCONTINUED | OUTPATIENT
Start: 2024-02-07 | End: 2024-02-07 | Stop reason: HOSPADM

## 2024-02-07 RX ORDER — DEXAMETHASONE SODIUM PHOSPHATE 4 MG/ML
INJECTION, SOLUTION INTRA-ARTICULAR; INTRALESIONAL; INTRAMUSCULAR; INTRAVENOUS; SOFT TISSUE AS NEEDED
Status: DISCONTINUED | OUTPATIENT
Start: 2024-02-07 | End: 2024-02-07 | Stop reason: SURG

## 2024-02-07 RX ORDER — KETAMINE HCL IN NACL, ISO-OSM 100MG/10ML
SYRINGE (ML) INJECTION AS NEEDED
Status: DISCONTINUED | OUTPATIENT
Start: 2024-02-07 | End: 2024-02-07 | Stop reason: SURG

## 2024-02-07 RX ORDER — LIDOCAINE HYDROCHLORIDE 20 MG/ML
INJECTION, SOLUTION INFILTRATION; PERINEURAL AS NEEDED
Status: DISCONTINUED | OUTPATIENT
Start: 2024-02-07 | End: 2024-02-07 | Stop reason: SURG

## 2024-02-07 RX ORDER — PROPOFOL 10 MG/ML
VIAL (ML) INTRAVENOUS AS NEEDED
Status: DISCONTINUED | OUTPATIENT
Start: 2024-02-07 | End: 2024-02-07 | Stop reason: SURG

## 2024-02-07 RX ORDER — CYANOCOBALAMIN 1000 UG/ML
1000 INJECTION, SOLUTION INTRAMUSCULAR; SUBCUTANEOUS ONCE
Status: COMPLETED | OUTPATIENT
Start: 2024-02-07 | End: 2024-02-07

## 2024-02-07 RX ORDER — MIDAZOLAM HYDROCHLORIDE 1 MG/ML
1 INJECTION INTRAMUSCULAR; INTRAVENOUS
Status: DISCONTINUED | OUTPATIENT
Start: 2024-02-07 | End: 2024-02-07 | Stop reason: HOSPADM

## 2024-02-07 RX ORDER — SODIUM CHLORIDE 9 MG/ML
40 INJECTION, SOLUTION INTRAVENOUS AS NEEDED
Status: DISCONTINUED | OUTPATIENT
Start: 2024-02-07 | End: 2024-02-07 | Stop reason: HOSPADM

## 2024-02-07 RX ORDER — PROMETHAZINE HYDROCHLORIDE 25 MG/1
25 SUPPOSITORY RECTAL ONCE AS NEEDED
Status: DISCONTINUED | OUTPATIENT
Start: 2024-02-07 | End: 2024-02-07 | Stop reason: HOSPADM

## 2024-02-07 RX ORDER — LABETALOL HYDROCHLORIDE 5 MG/ML
5 INJECTION, SOLUTION INTRAVENOUS
Status: DISCONTINUED | OUTPATIENT
Start: 2024-02-07 | End: 2024-02-07 | Stop reason: HOSPADM

## 2024-02-07 RX ORDER — HYDROMORPHONE HYDROCHLORIDE 1 MG/ML
0.5 INJECTION, SOLUTION INTRAMUSCULAR; INTRAVENOUS; SUBCUTANEOUS
Status: DISCONTINUED | OUTPATIENT
Start: 2024-02-07 | End: 2024-02-07 | Stop reason: HOSPADM

## 2024-02-07 RX ORDER — GLYCOPYRROLATE 0.2 MG/ML
INJECTION INTRAMUSCULAR; INTRAVENOUS AS NEEDED
Status: DISCONTINUED | OUTPATIENT
Start: 2024-02-07 | End: 2024-02-07 | Stop reason: SURG

## 2024-02-07 RX ORDER — ONDANSETRON 4 MG/1
4 TABLET, ORALLY DISINTEGRATING ORAL EVERY 4 HOURS PRN
Qty: 30 TABLET | Refills: 0 | Status: SHIPPED | OUTPATIENT
Start: 2024-02-07

## 2024-02-07 RX ORDER — SODIUM CHLORIDE 0.9 % (FLUSH) 0.9 %
3 SYRINGE (ML) INJECTION EVERY 12 HOURS SCHEDULED
Status: DISCONTINUED | OUTPATIENT
Start: 2024-02-07 | End: 2024-02-07 | Stop reason: HOSPADM

## 2024-02-07 RX ORDER — SCOLOPAMINE TRANSDERMAL SYSTEM 1 MG/1
1 PATCH, EXTENDED RELEASE TRANSDERMAL CONTINUOUS
Status: DISCONTINUED | OUTPATIENT
Start: 2024-02-07 | End: 2024-02-07 | Stop reason: HOSPADM

## 2024-02-07 RX ADMIN — LIDOCAINE HYDROCHLORIDE 100 MG: 20 INJECTION, SOLUTION INFILTRATION; PERINEURAL at 08:46

## 2024-02-07 RX ADMIN — SODIUM CHLORIDE, POTASSIUM CHLORIDE, SODIUM LACTATE AND CALCIUM CHLORIDE 500 ML: 600; 310; 30; 20 INJECTION, SOLUTION INTRAVENOUS at 08:01

## 2024-02-07 RX ADMIN — FENTANYL CITRATE 50 MCG: 50 INJECTION, SOLUTION INTRAMUSCULAR; INTRAVENOUS at 10:19

## 2024-02-07 RX ADMIN — FENTANYL CITRATE 25 MCG: 50 INJECTION, SOLUTION INTRAMUSCULAR; INTRAVENOUS at 08:46

## 2024-02-07 RX ADMIN — Medication 200 MCG: at 09:17

## 2024-02-07 RX ADMIN — MIDAZOLAM 1 MG: 1 INJECTION INTRAMUSCULAR; INTRAVENOUS at 08:06

## 2024-02-07 RX ADMIN — SCOPALAMINE 1 PATCH: 1 PATCH, EXTENDED RELEASE TRANSDERMAL at 07:57

## 2024-02-07 RX ADMIN — Medication 200 MCG: at 09:03

## 2024-02-07 RX ADMIN — Medication 200 MCG: at 09:04

## 2024-02-07 RX ADMIN — SODIUM CHLORIDE, SODIUM LACTATE, POTASSIUM CHLORIDE, AND CALCIUM CHLORIDE: 600; 310; 30; 20 INJECTION, SOLUTION INTRAVENOUS at 08:34

## 2024-02-07 RX ADMIN — SUGAMMADEX 200 MG: 100 INJECTION, SOLUTION INTRAVENOUS at 09:32

## 2024-02-07 RX ADMIN — ROCURONIUM BROMIDE 50 MG: 10 INJECTION, SOLUTION INTRAVENOUS at 08:46

## 2024-02-07 RX ADMIN — DEXAMETHASONE SODIUM PHOSPHATE 8 MG: 4 INJECTION, SOLUTION INTRA-ARTICULAR; INTRALESIONAL; INTRAMUSCULAR; INTRAVENOUS; SOFT TISSUE at 08:58

## 2024-02-07 RX ADMIN — ENOXAPARIN SODIUM 40 MG: 100 INJECTION SUBCUTANEOUS at 13:01

## 2024-02-07 RX ADMIN — PANTOPRAZOLE SODIUM 40 MG: 40 INJECTION, POWDER, FOR SOLUTION INTRAVENOUS at 07:57

## 2024-02-07 RX ADMIN — PROPOFOL 200 MCG/KG/MIN: 10 INJECTION, EMULSION INTRAVENOUS at 08:50

## 2024-02-07 RX ADMIN — Medication 20 MG: at 09:21

## 2024-02-07 RX ADMIN — THIAMINE HYDROCHLORIDE 100 MG: 100 INJECTION, SOLUTION INTRAMUSCULAR; INTRAVENOUS at 10:04

## 2024-02-07 RX ADMIN — MAGNESIUM SULFATE HEPTAHYDRATE 2 G: 500 INJECTION, SOLUTION INTRAMUSCULAR; INTRAVENOUS at 08:55

## 2024-02-07 RX ADMIN — METOCLOPRAMIDE 10 MG: 5 INJECTION, SOLUTION INTRAMUSCULAR; INTRAVENOUS at 08:00

## 2024-02-07 RX ADMIN — AMISULPRIDE 10 MG: 2.5 INJECTION, SOLUTION INTRAVENOUS at 09:59

## 2024-02-07 RX ADMIN — GABAPENTIN 300 MG: 300 CAPSULE ORAL at 07:55

## 2024-02-07 RX ADMIN — FENTANYL CITRATE 25 MCG: 50 INJECTION, SOLUTION INTRAMUSCULAR; INTRAVENOUS at 09:32

## 2024-02-07 RX ADMIN — Medication 20 MG: at 08:57

## 2024-02-07 RX ADMIN — Medication 200 MCG: at 09:02

## 2024-02-07 RX ADMIN — 0.12% CHLORHEXIDINE GLUCONATE 15 ML: 1.2 RINSE ORAL at 07:56

## 2024-02-07 RX ADMIN — CYANOCOBALAMIN 1000 MCG: 1000 INJECTION, SOLUTION INTRAMUSCULAR; SUBCUTANEOUS at 10:01

## 2024-02-07 RX ADMIN — PROPOFOL 200 MG: 10 INJECTION, EMULSION INTRAVENOUS at 08:46

## 2024-02-07 RX ADMIN — ONDANSETRON 4 MG: 2 INJECTION INTRAMUSCULAR; INTRAVENOUS at 09:32

## 2024-02-07 RX ADMIN — GLYCOPYRROLATE 0.2 MG: 0.2 INJECTION INTRAMUSCULAR; INTRAVENOUS at 09:02

## 2024-02-07 RX ADMIN — CEFAZOLIN SODIUM 2000 MG: 2 INJECTION, SOLUTION INTRAVENOUS at 08:16

## 2024-02-07 NOTE — ANESTHESIA PREPROCEDURE EVALUATION
Anesthesia Evaluation     Patient summary reviewed and Nursing notes reviewed   history of anesthetic complications:  PONV  NPO Solid Status: > 8 hours  NPO Liquid Status: > 2 hours           Airway   Mallampati: II  TM distance: >3 FB  Neck ROM: full  Dental      Pulmonary    (+) ,sleep apnea  Cardiovascular     ECG reviewed      ROS comment: EKG Normal    Neuro/Psych  GI/Hepatic/Renal/Endo    (+) obesity, hiatal hernia, GERD    Musculoskeletal     Abdominal   (+) obese   Substance History      OB/GYN          Other                          Anesthesia Plan    ASA 3     general   total IV anesthesia  intravenous induction     Anesthetic plan, risks, benefits, and alternatives have been provided, discussed and informed consent has been obtained with: patient.        CODE STATUS:

## 2024-02-07 NOTE — OP NOTE
PREOPERATIVE DIAGNOSIS:  Morbid obesity with multiple comorbidities as referenced in the most recent history and physical.    POSTOPERATIVE DIAGNOSIS:  Morbid obesity with multiple comorbidities as referenced in the most recent history and physical.  Paraesophageal hernia    PROCEDURES PERFORMED:  1.  Laparoscopic sleeve gastrectomy with Titan stapler # [id number]  2.  Laparoscopic paraesophageal hernia repair    SURGEON:  Dennis Gleason Jr., MD    ASSISTANT: DONALDO Davis      Surgery assisted and facilitated by a certified physician assistant, who directly resulted in a decreased operative time, anesthetic time, wound exposure, and possibly of an operative wound infection, thereby decreasing patient morbidity and ultimately total expenditures.  The surgical assistant assisted in placement of trochars, take down of the gastrocolic omentum, short gastric vessels and dissection at the angle of His.  Also assisted in retraction of the stomach during stapling so as not to kink the gastric sleeve.  Also assisted in removing of the gastric specimen, closure of the fascial defect as well as closure of the skin incisions.    ANESTHESIA:  General endotracheal and TAP block with Ropivacaine mixture    ESTIMATED BLOOD LOSS:   Less than 25 mL unless dictated below.    FLUIDS:  Crystalloids.    SPECIMENS:  Gastric remnant    DRAINS:  None.    COUNTS:  Correct.    COMPLICATIONS:  None.    INDICATIONS:  This patient with morbid obesity and associated comorbidities presents for elective laparoscopic, possible open sleeve gastrectomy.  The patient has received medical clearance to proceed.  The patient has undergone our extensive educational process and consent process and wishes to proceed.    DESCRIPTION OF PROCEDURE:  The patient was brought to the operating room and placed supine upon the operating room table. SCD hose were placed.  The patient underwent uneventful general endotracheal anesthesia per the anesthesiology  staff. The abdomen was prepped with ChloraPrep and draped in the usual sterile fashion.  An Ioban was used as well if not allergic.  Anesthesia staff then passed a 38-Fr balloon bougie into the stomach to decompress.  A 5-10 mm transverse incision was made a few centimeters above and to the left of the umbilicus and the peritoneal cavity entered under direct camera visualization using a 5 or 10 mm 0° laparoscope and an Optiview trocar.  The abdomen was then insufflated to a pressure of 15 mmHg with CO2 gas.  Exploratory laparoscopy revealed no evidence of injury from the entrance technique and no significant abnormalities unless addendum dictated below.  An angled laparoscope was then used.  The patient was placed in reverse Trendelenburg position.  Under direct camera visualization a 19 mm trocar was placed in the right lateral subcostal position.  A 5 mm trocar was placed in the right midabdominal position.  A 5 mm trocar was placed in the left midabdominal position. A Rocky retractor was placed through an epigastric incision and used to elevate the left lobe of the liver.  The fat pad was elevated and the left alison exposed.  At this point, approximately retirement along the greater curvature, the gastrocolic omentum was divided with the Enseal and this proceeded superiorly to the angle of His taking down the short gastric vessels.  All posterior attachments of the lesser sac and posterior aspect of the stomach to the pancreas were taken down as well.  The left alison was exposed along its length.  Dissection then proceeded medially taking down the greater curvature with an Enseal until just proximal to the pylorus.  The 38 American bougie was then directed distally into the stomach to the pylorus.  The balloon was inflated with 15 cc of saline.  The stomach was marked in the 3 positions using indelible ink.  The 3 markings were at the angle of Hiss, the incisura and antrum using 1cm, 3cm and 4-5cm respectively.  The  Titan stapler was then placed through the 19 mm trocar into the abdomen and opened up and the stomach pulled in to the markings on the stomach.  Careful attention was made to stay 1 cm from the esophagus.  Positive pressure retraction was then used to size the stomach perfectly.  The balloon on the bougie was then deflated and placed to suction prior to closing the stapler.  The stapler was then fired and then removed after completion.  Areas of the staple line were oversewn with absorbable sutures as needed for bleeding or questionable staple lines.  At this point, the gastrectomy specimen was withdrawn through the 12 mm trocar site incision. The specimen staple line was inspected and was intact.  The specimen was then sent off to pathology.  At this point, the sleeve was submerged under saline and using the bougie to insufflate the stomach, a leak test was performed.  This revealed the sleeve to be watertight, no air bubbles, no leak, and no bleeding seen from the staple lines and no significant abnormalities.  Irrigation fluid from the abdomen was then suctioned.  The gastric sleeve staple line was then treated with Tisseel fibrin glue. The fascia at the 19 mm trocar site incision was closed with a single 0 Vicryl suture in a figure-of-eight fashion placed under direct laparoscopic camera visualization with a suture passer and tying the knot extracorporeally.  The fascia in the area was infiltrated with local anesthesia. All incisions were then infiltrated with local anesthetic. The remaining trocars were removed under direct camera visualization with no bleeding noted from their sites.  The abdomen was desufflated of gas. The skin in each incision was closed using 4-0 antibiotic impregnated Monocryl in a subcuticular fashion followed by Dermabond.  The patient tolerated the procedure well without complication and was taken to the recovery room in stable condition.  All sponge, needle and instrument counts were  correct.     The patient was noted to have a paraesophageal hernia.  The phrenoesophageal membrane was opened up with electrocautery and the right and left crura were cleaned off using sharp and blunt dissection.  The peritoneum overlying the right alison was incised and the plane along the hernia sac was developed.  The dissection then extended anteriorly and laterally to the left alison.  The base of the crural confluence was dissected free of adhesions to the hernia sac.  The hernia sac was carefully dissected into the mediastinum with caudal traction.  The interfaces between the pleura, pericardium, spine, and aorta were developed as a dissection was carried cephalically to the top of the hernia sac.  Sac contents were completely reduced back into the abdominal cavity.  Careful attention was made not to injure the vagus nerve.  The esophagus was identified and dissected circumferentially and along its previous stomach course in order to reduce tension, allowing the gastroesophageal junction to rest comfortably within the abdominal cavity.  The gastrosplenic ligament and the short gastric vessels were divided with the Enseal device.  The retroesophageal window was developed and the esophagus was retracted caudally.  The crural pillars were then approximated with 2-0 V Loc and continued along the left alison to pexy the esophagus up to the 12 o'clock position.  The repair was then checked with the bougie and was adequate and not too tight.

## 2024-02-07 NOTE — ANESTHESIA POSTPROCEDURE EVALUATION
Patient: Ellie Saini    Procedure Summary       Date: 02/07/24 Room / Location:  [facility]  [facility]    Anesthesia Start: 0834 Anesthesia Stop: 0947    Procedure: Sleeve gastrectomy LAPAROSCOPIC  With PARAESPHAGEAL Hernia Repair (Abdomen) Diagnosis:       Obesity, Class III, BMI 40-49.9 (morbid obesity)      Hiatal hernia      (Obesity, Class III, BMI 40-49.9 (morbid obesity) [E66.01])      (Hiatal hernia [K44.9])    Surgeons: Denins Gleason Jr., MD Provider: Beni Marquez MD    Anesthesia Type: general ASA Status: 3            Anesthesia Type: general    Vitals  Vitals Value Taken Time   /79 02/07/24 1015   Temp 36.3 °C (97.4 °F) 02/07/24 0945   Pulse 88 02/07/24 1027   Resp 16 02/07/24 1000   SpO2 91 % 02/07/24 1027   Vitals shown include unfiled device data.        Post Anesthesia Care and Evaluation    Patient location during evaluation: bedside  Patient participation: complete - patient participated  Level of consciousness: awake and alert  Pain management: adequate    Airway patency: patent  Anesthetic complications: No anesthetic complications  PONV Status: controlled  Cardiovascular status: blood pressure returned to baseline and acceptable  Respiratory status: acceptable  Hydration status: acceptable

## 2024-02-08 ENCOUNTER — READMISSION MANAGEMENT (OUTPATIENT)
Dept: CALL CENTER | Facility: HOSPITAL | Age: 39
End: 2024-02-08
Payer: COMMERCIAL

## 2024-02-08 LAB
LAB AP CASE REPORT: NORMAL
PATH REPORT.FINAL DX SPEC: NORMAL
PATH REPORT.GROSS SPEC: NORMAL

## 2024-02-08 NOTE — OUTREACH NOTE
Prep Survey      Flowsheet Row Responses   [facility] patient discharged from? [place]   Is LACE score < 7 ? Yes   Eligibility Not Eligible   [facility]  [[facility] provider]   Date of Admission 02/07/24   Date of Discharge 02/07/24   Discharge Disposition Home or Self Care   Discharge diagnosis Sleeve gastrectomy LAPAROSCOPIC  With PARAESPHAGEAL Hernia Repai   Does the patient have one of the following disease processes/diagnoses(primary or secondary)? General Surgery   Does the patient have Home health ordered? No   Is there a DME ordered? No   Prep survey completed? Yes            Whitney ANDERSEN - Registered Nurse

## 2024-02-12 ENCOUNTER — OFFICE VISIT (OUTPATIENT)
Dept: BARIATRICS/WEIGHT MGMT | Facility: CLINIC | Age: 39
End: 2024-02-12
Payer: COMMERCIAL

## 2024-02-12 VITALS
SYSTOLIC BLOOD PRESSURE: 127 MMHG | DIASTOLIC BLOOD PRESSURE: 88 MMHG | HEART RATE: 105 BPM | WEIGHT: 157 LBS | HEIGHT: 56 IN | BODY MASS INDEX: 35.32 KG/M2 | TEMPERATURE: 97.8 F

## 2024-02-12 DIAGNOSIS — K31.84 GASTROPARESIS: ICD-10-CM

## 2024-02-12 DIAGNOSIS — Z71.3 DIETARY COUNSELING: ICD-10-CM

## 2024-02-12 DIAGNOSIS — Z98.84 S/P LAPAROSCOPIC SLEEVE GASTRECTOMY: ICD-10-CM

## 2024-02-12 DIAGNOSIS — E66.9 OBESITY, CLASS I, BMI 30-34.9: Primary | ICD-10-CM

## 2024-02-12 PROBLEM — E66.811 OBESITY, CLASS I, BMI 30-34.9: Status: ACTIVE | Noted: 2024-01-10

## 2024-02-12 PROBLEM — E66.813 OBESITY, CLASS III, BMI 40-49.9 (MORBID OBESITY): Status: RESOLVED | Noted: 2024-02-07 | Resolved: 2024-02-12

## 2024-02-12 PROBLEM — E66.01 OBESITY, CLASS III, BMI 40-49.9 (MORBID OBESITY): Status: RESOLVED | Noted: 2024-02-07 | Resolved: 2024-02-12

## 2024-02-12 PROBLEM — E66.812 OBESITY, CLASS II, BMI 35-39.9: Status: RESOLVED | Noted: 2024-01-10 | Resolved: 2024-02-12

## 2024-02-12 PROCEDURE — 1159F MED LIST DOCD IN RCRD: CPT | Performed by: NURSE PRACTITIONER

## 2024-02-12 PROCEDURE — 1160F RVW MEDS BY RX/DR IN RCRD: CPT | Performed by: NURSE PRACTITIONER

## 2024-02-12 PROCEDURE — 99024 POSTOP FOLLOW-UP VISIT: CPT | Performed by: NURSE PRACTITIONER

## 2024-02-12 RX ORDER — TOPIRAMATE 50 MG/1
50 TABLET, FILM COATED ORAL NIGHTLY
COMMUNITY
Start: 2024-01-26

## 2024-02-12 RX ORDER — TRAMADOL HYDROCHLORIDE 50 MG/1
50 TABLET ORAL EVERY 6 HOURS PRN
Qty: 20 TABLET | Refills: 0 | Status: SHIPPED | OUTPATIENT
Start: 2024-02-12

## 2024-02-29 ENCOUNTER — OFFICE VISIT (OUTPATIENT)
Dept: BARIATRICS/WEIGHT MGMT | Facility: CLINIC | Age: 39
End: 2024-02-29
Payer: COMMERCIAL

## 2024-02-29 VITALS
WEIGHT: 149.5 LBS | HEIGHT: 56 IN | BODY MASS INDEX: 33.63 KG/M2 | SYSTOLIC BLOOD PRESSURE: 122 MMHG | HEART RATE: 89 BPM | DIASTOLIC BLOOD PRESSURE: 85 MMHG | TEMPERATURE: 97.5 F

## 2024-02-29 DIAGNOSIS — E66.9 OBESITY, CLASS I, BMI 30-34.9: ICD-10-CM

## 2024-02-29 DIAGNOSIS — Z71.3 DIETARY COUNSELING: ICD-10-CM

## 2024-02-29 DIAGNOSIS — Z98.84 S/P LAPAROSCOPIC SLEEVE GASTRECTOMY: Primary | ICD-10-CM

## 2024-02-29 DIAGNOSIS — L76.32 POSTOPERATIVE HEMATOMA OF SKIN FOLLOWING NON-DERMATOLOGIC PROCEDURE: ICD-10-CM

## 2024-02-29 PROCEDURE — 99024 POSTOP FOLLOW-UP VISIT: CPT | Performed by: NURSE PRACTITIONER

## 2024-02-29 RX ORDER — TRAZODONE HYDROCHLORIDE 50 MG/1
50 TABLET ORAL NIGHTLY
COMMUNITY
Start: 2024-02-22 | End: 2024-03-24

## 2024-02-29 NOTE — PROGRESS NOTES
[facility] BARIATRIC SURGERY  [address] [facility] [address]  [facility] [id number]  [phone]  [address] [id number] [phone]  Dept: [phone]  2/29/2024      Ellie Saini.  [id number]  [id number]  1985  female      Chief Complaint   Patient presents with    Post-op     3 week post op sleeve       BH Post-Op Bariatric Surgery:   Ellie Saini is status post Laparoscopic Sleeve with PEHR procedure, performed on 2/7/2024     HPI:   Today's weight is 67.8 kg (149 lb 8 oz) pounds, today's BMI is Body mass index is 32.93 kg/m²., has a  loss of 8 pounds since the last visit and weight loss since surgery is 13 pounds. The patient reports a decreased portion size and loss of appetite.      Ellie Saini denies nausea, vomiting, reflux, dysphagia and reports tolerating advancing diet.  Bruise on abdomen increased in size and has continued to be painful.  Worried that bruising by her umbilicus is becoming more purple in color.  Has not noticed any increased warmth at the area.  Did stop Lovenox after last appt as discussed.  Is worried about returning to work in 2 weeks as she works in a  and needs to be able to lift, carry children.  She lifted her niece and noticed increased pain afterwards.    Is drinking 1-2 fairlife shakes per day.       Diet and Exercise: Diet history reviewed and discussed with the patient. Weight loss/gains to date discussed with the patient. The patient states they are eating 60 grams of protein per day. She reports eating 3 meals per day, a typical portion size of 1/2 cup, eating 1 snacks per day, drinking 5 or more 8-oz. glasses of water per day, no carbonated beverage consumption and exercising regularly.     Supplements: bmtv.     Review of Systems   Constitutional:  Positive for activity change and appetite change.   Respiratory:  Negative for shortness of breath.    Cardiovascular:   Negative for chest pain.   Gastrointestinal:  Positive for abdominal pain.   Skin:  Positive for color change.        Bruising to abdomen   All other systems reviewed and are negative.      Patient Active Problem List   Diagnosis    Bilateral low back pain with right-sided sciatica    Intractable migraine without aura and without status migrainosus    Neck pain    General medical exam    Anxiety    Syncope    Hx of migraines    Visual changes    LOC (loss of consciousness)    Chronic constipation    Primary insomnia    Irritable bowel syndrome with constipation    Generalized abdominal pain    Nausea    Gastroparesis    LIUDMILA (obstructive sleep apnea)    Obesity, Class I, BMI 30-34.9    Dietary counseling    Edema    GERD (gastroesophageal reflux disease)    Hiatal hernia    S/P laparoscopic sleeve gastrectomy with PEHR    Postoperative hematoma of skin following non-dermatologic procedure       Past Medical History:   Diagnosis Date    Acute bronchitis     Acute maxillary sinusitis     Acute otitis media     Acute pharyngitis     Allergic rhinitis     Anesthesia complication     GETS MIGRAINE AFTER POST-OP    Breast lump     Cramp in limb     Cramp in lower limb - bilateral       Ear fullness, bilateral     Hormone replacement therapy (HRT)     H/O: hormone replacement (HRT)    Insomnia     Lymphadenopathy     Malaise     Other malaise    Migraine     PONV (postoperative nausea and vomiting)     Postoperative visit     Sleep apnea     CPAP    Upper respiratory infection     Upper respiratory infection, acute     Urinary tract infection     Vertigo        The following portions of the patient's history were reviewed and updated as appropriate: allergies, current medications, past medical history, past surgical history, and problem list.    Vitals:    02/29/24 1226   BP: 122/85   Pulse: 89   Temp: 97.5 °F (36.4 °C)       Physical Exam  Vitals reviewed.   Constitutional:       General: She is awake. She is not in acute  distress.     Appearance: She is morbidly obese.   HENT:      Head: Normocephalic and atraumatic.      Mouth/Throat:      Mouth: Mucous membranes are moist.   Eyes:      General: No scleral icterus.     Extraocular Movements: Extraocular movements intact.      Conjunctiva/sclera: Conjunctivae normal.      Pupils: Pupils are equal, round, and reactive to light.   Cardiovascular:      Rate and Rhythm: Normal rate and regular rhythm.   Pulmonary:      Effort: Pulmonary effort is normal. No respiratory distress.   Abdominal:      General: Abdomen is flat. Bowel sounds are normal. There is no distension.      Palpations: Abdomen is soft.      Tenderness: There is no abdominal tenderness. There is no guarding.      Comments: Incisions healed  Bruising with outer edge yellow, then green to purple working towards umbilicus  Skin soft  *see photo in message from yesterday   Musculoskeletal:         General: Normal range of motion.      Cervical back: Normal range of motion and neck supple.   Skin:     General: Skin is warm and dry.   Neurological:      General: No focal deficit present.      Mental Status: She is alert and oriented to person, place, and time.   Psychiatric:         Mood and Affect: Mood normal.         Behavior: Behavior normal. Behavior is cooperative.         Thought Content: Thought content normal.         Judgment: Judgment normal.         Assessment:   Post-op, the patient is doing well other than bruising to abdomen with is somewhat uncomfortable and worse with activity.     Encounter Diagnoses   Name Primary?    S/P laparoscopic sleeve gastrectomy with PEHR Yes    Postoperative hematoma of skin following non-dermatologic procedure     Obesity, Class I, BMI 30-34.9     Dietary counseling        Plan:   Will check cbc  Ok for additional time off from work.  Given note  For any changes please call office.    Encouraged patient to be sure to get plenty of lean protein per day through small frequent meals  all with a protein source.   Activity restrictions: none.   Recommended patient be sure to get at least 70 grams of protein per day by eating small, frequent meals all with high lean protein choices. Be sure to limit/cut back on daily carbohydrate intake. Discussed with the patient the recommended amount of water per day to intake- half of body weight in ounces. Reviewed vitamin requirements. Be sure to do routine exercise, 150 minutes per week minimum, including both cardio and strength training.     Instructions / Recommendations: dietary counseling recommended, recommended a daily protein intake of  grams, vitamin supplement(s) recommended, recommended exercising at least 150 minutes per week, behavior modifications recommended and instructed to call the office for concerns, questions, or problems.     The patient was instructed to follow up as scheduled in 2 weeks     Total time spent during this encounter today was 25 minutes

## 2024-03-21 ENCOUNTER — TELEMEDICINE (OUTPATIENT)
Dept: BARIATRICS/WEIGHT MGMT | Facility: CLINIC | Age: 39
End: 2024-03-21
Payer: COMMERCIAL

## 2024-03-21 DIAGNOSIS — K31.84 GASTROPARESIS: ICD-10-CM

## 2024-03-21 DIAGNOSIS — E66.9 OBESITY, CLASS I, BMI 30-34.9: Primary | ICD-10-CM

## 2024-03-21 DIAGNOSIS — Z98.84 S/P LAPAROSCOPIC SLEEVE GASTRECTOMY: ICD-10-CM

## 2024-03-21 DIAGNOSIS — K59.09 CHRONIC CONSTIPATION: ICD-10-CM

## 2024-03-21 PROCEDURE — 99024 POSTOP FOLLOW-UP VISIT: CPT | Performed by: PHYSICIAN ASSISTANT

## 2024-03-21 NOTE — PROGRESS NOTES
[facility] BARIATRIC SURGERY  [address]  [facility] [id number] [phone]  [address]  [facility] [address] [phone]  Dept: [phone]  3/22/2024      Ellie Saini.  [id number]  [id number]  1985  female    Mode of Visit: Video  Location of patient: other: in car  You have chosen to receive care through a telehealth visit.  Does the patient consent to use a video/audio connection for your medical care today? Yes  The visit included audio and video interaction. No technical issues occurred during this visit.     CC: follow-up sleeve      BH Post-Op Bariatric Surgery:   Ellie Saini is status post Laparoscopic Sleeve wit PEHR procedure, performed on 02/07/2024     HPI:   Today's weight is 142  pounds,  she  has a  loss of 7 pounds since the last visit and   her   weight loss since surgery is 20 pounds. The patient reports a decreased portion size and loss of appetite.      Ellie Saini denies n/v/regurg/reflux and reports constipation, bloating, and a few days where she felt she was having a flare up of gastroparesis.  Pt continues to take Reglan daily.    Pt reports bruising to abdomen much improved.     Diet and Exercise: Diet history reviewed and discussed with the patient. Weight loss/gains to date discussed with the patient. The patient states they are eating unknown grams of protein per day. She reports eating 2-3 meals per day with 1 protein shake, a typical portion size of <1 cup, eating 0 snacks per day, drinking 5 or more 8-oz. glasses of water per day, no carbonated beverage consumption.    Pt reports tolerating ground turkey and deli meat without issue. Pt reports issues with chicken breast.    Supplements: bariatric multi.     Review of Systems   Constitutional:  Positive for appetite change and fatigue.   Gastrointestinal:  Positive for constipation.   All other systems reviewed and are  negative.      Patient Active Problem List   Diagnosis    Bilateral low back pain with right-sided sciatica    Intractable migraine without aura and without status migrainosus    Neck pain    General medical exam    Anxiety    Syncope    Hx of migraines    Visual changes    LOC (loss of consciousness)    Chronic constipation    Primary insomnia    Irritable bowel syndrome with constipation    Generalized abdominal pain    Nausea    Gastroparesis    LIUDMILA (obstructive sleep apnea)    Obesity, Class I, BMI 30-34.9    Dietary counseling    Edema    GERD (gastroesophageal reflux disease)    Hiatal hernia    S/P laparoscopic sleeve gastrectomy with PEHR    Postoperative hematoma of skin following non-dermatologic procedure       Past Medical History:   Diagnosis Date    Acute bronchitis     Acute maxillary sinusitis     Acute otitis media     Acute pharyngitis     Allergic rhinitis     Anesthesia complication     GETS MIGRAINE AFTER POST-OP    Breast lump     Cramp in limb     Cramp in lower limb - bilateral       Ear fullness, bilateral     Hormone replacement therapy (HRT)     H/O: hormone replacement (HRT)    Insomnia     Lymphadenopathy     Malaise     Other malaise    Migraine     PONV (postoperative nausea and vomiting)     Postoperative visit     Sleep apnea     CPAP    Upper respiratory infection     Upper respiratory infection, acute     Urinary tract infection     Vertigo        The following portions of the patient's history were reviewed and updated as appropriate: allergies, current medications, past medical history, past surgical history, and problem list.    There were no vitals filed for this visit.    Physical Exam  Vitals reviewed.   Constitutional:       Appearance: Normal appearance.   HENT:      Head: Normocephalic and atraumatic.   Eyes:      Pupils: Pupils are equal, round, and reactive to light.   Cardiovascular:      Rate and Rhythm: Normal rate.   Pulmonary:      Effort: Pulmonary effort is  normal. No respiratory distress.   Musculoskeletal:         General: Normal range of motion.      Cervical back: Normal range of motion and neck supple.   Neurological:      General: No focal deficit present.      Mental Status: She is alert and oriented to person, place, and time.   Psychiatric:         Mood and Affect: Mood normal.         Behavior: Behavior normal.         Assessment:   Post-op, the patient is improving.     Encounter Diagnoses   Name Primary?    Obesity, Class I, BMI 30-34.9 Yes    S/P laparoscopic sleeve gastrectomy with PEHR     Chronic constipation     Gastroparesis        Plan:   Encouraged pt to continue increase fluid intake and work on bowel regimen.  Continue reglan.  Will get 1 month labs.    Encouraged patient to be sure to get plenty of lean protein per day through small frequent meals all with a protein source.   Activity restrictions: none.   Recommended patient be sure to get at least 70 grams of protein per day by eating small, frequent meals all with high lean protein choices. Be sure to limit/cut back on daily carbohydrate intake. Discussed with the patient the recommended amount of water per day to intake- half of body weight in ounces. Reviewed vitamin requirements. Be sure to do routine exercise, 150 minutes per week minimum, including both cardio and strength training.     Instructions / Recommendations: dietary counseling recommended, recommended a daily protein intake of  grams, vitamin supplement(s) recommended, recommended exercising at least 150 minutes per week, behavior modifications recommended and instructed to call the office for concerns, questions, or problems.     The patient was instructed to follow up in 6 weeks .     The patient was counseled regarding. Total time spent during this encounter was 20 minutes.

## 2024-08-09 ENCOUNTER — TELEMEDICINE (OUTPATIENT)
Dept: BARIATRICS/WEIGHT MGMT | Facility: CLINIC | Age: 39
End: 2024-08-09
Payer: COMMERCIAL

## 2024-08-09 DIAGNOSIS — E66.9 OBESITY, CLASS I, BMI 30-34.9: Primary | ICD-10-CM

## 2024-08-09 DIAGNOSIS — K21.9 GASTROESOPHAGEAL REFLUX DISEASE, UNSPECIFIED WHETHER ESOPHAGITIS PRESENT: ICD-10-CM

## 2024-08-09 DIAGNOSIS — Z98.84 S/P LAPAROSCOPIC SLEEVE GASTRECTOMY: ICD-10-CM

## 2024-08-09 RX ORDER — PANTOPRAZOLE SODIUM 40 MG/1
40 TABLET, DELAYED RELEASE ORAL DAILY
Qty: 30 TABLET | Refills: 5 | Status: SHIPPED | OUTPATIENT
Start: 2024-08-09

## 2024-08-09 RX ORDER — ATOGEPANT 60 MG/1
60 TABLET ORAL DAILY
COMMUNITY
Start: 2024-07-29 | End: 2025-07-30

## 2024-08-09 RX ORDER — ONDANSETRON HYDROCHLORIDE 8 MG/1
TABLET, FILM COATED ORAL
COMMUNITY

## 2024-08-09 RX ORDER — DESVENLAFAXINE SUCCINATE 50 MG/1
50 TABLET, EXTENDED RELEASE ORAL DAILY
COMMUNITY
Start: 2024-07-29 | End: 2025-07-30

## 2024-08-09 RX ORDER — FLUTICASONE PROPIONATE 50 MCG
SPRAY, SUSPENSION (ML) NASAL
COMMUNITY
Start: 2024-04-12

## 2024-08-09 RX ORDER — TRAZODONE HYDROCHLORIDE 50 MG/1
50 TABLET ORAL
COMMUNITY
Start: 2024-07-29 | End: 2025-08-26

## 2024-08-09 NOTE — PROGRESS NOTES
[facility] BARIATR[facility] BARIATRIC SURGERY  [address]  [facility] [id number] [phone]  [address]  [facility] [address] [phone]  Dept: [phone]  8/9/2024      Ellie Saini.  [id number]  [id number]  1985  female    Mode of Visit: Video  Location of patient: other: car  Location of provider: [address]  [facility] [id number] [phone]   You have chosen to receive care through a telehealth visit.  Does the patient consent to use a video/audio connection for your medical care today? Yes  The visit included audio and video interaction. No technical issues occurred during this visit.     6 month post op      BH Post-Op Bariatric Surgery:   Ellie Saini is status post Laparoscopic Sleeve  with PEHR procedure, performed on 02/07/2024     HPI:   Today's weight is 125  pounds,  she  has a  loss of 24 pounds since the last visit and   her   weight loss since surgery is 37 pounds. The patient reports a decreased portion size and loss of appetite.      Ellie Saini denies n/v/d and reports increased reflux and waking with vomit in her mouth several nights per week. Pt states she is taking Prilosec and started adding in Pepcid as well. She reports constipation with bm every 1-2 weeks. Pt reports increased migraines. She reports she has been out of Reglan for 1-2 months and wonders if this has caused increased reflux.     Pt voiced noticing loose skin to lower abdomen. Pt voiced wanting to lose an additional 5lbs and then maintain.     Diet and Exercise: Diet history reviewed and discussed with the patient. Weight loss/gains to date discussed with the patient.     Supplements: 2 womens multi gummies and 2 hair/nail vitamins.     Review of Systems   Constitutional:  Positive for appetite change. Negative for fatigue and unexpected weight change.   HENT: Negative.     Eyes: Negative.    Respiratory:  Negative.     Cardiovascular: Negative.  Negative for leg swelling.   Gastrointestinal:  Positive for constipation. Negative for abdominal distention, abdominal pain, diarrhea, nausea and vomiting.        Reflux   Genitourinary:  Negative for difficulty urinating, frequency and urgency.   Musculoskeletal:  Negative for back pain.   Skin: Negative.    Psychiatric/Behavioral: Negative.     All other systems reviewed and are negative.      Patient Active Problem List   Diagnosis    Bilateral low back pain with right-sided sciatica    Intractable migraine without aura and without status migrainosus    Neck pain    General medical exam    Anxiety    Syncope    Hx of migraines    Visual changes    LOC (loss of consciousness)    Chronic constipation    Primary insomnia    Irritable bowel syndrome with constipation    Generalized abdominal pain    Nausea    Gastroparesis    LIUDMILA (obstructive sleep apnea)    Obesity, Class I, BMI 30-34.9    Dietary counseling    Edema    GERD (gastroesophageal reflux disease)    Hiatal hernia    S/P laparoscopic sleeve gastrectomy with PEHR    Postoperative hematoma of skin following non-dermatologic procedure       Past Medical History:   Diagnosis Date    Acute bronchitis     Acute maxillary sinusitis     Acute otitis media     Acute pharyngitis     Allergic rhinitis     Anesthesia complication     GETS MIGRAINE AFTER POST-OP    Breast lump     Cramp in limb     Cramp in lower limb - bilateral       Ear fullness, bilateral     Hormone replacement therapy (HRT)     H/O: hormone replacement (HRT)    Insomnia     Lymphadenopathy     Malaise     Other malaise    Migraine     PONV (postoperative nausea and vomiting)     Postoperative visit     Sleep apnea     CPAP    Upper respiratory infection     Upper respiratory infection, acute     Urinary tract infection     Vertigo        The following portions of the patient's history were reviewed and updated as appropriate: allergies, current  medications, past medical history, past surgical history, and problem list.    There were no vitals filed for this visit.    Physical Exam  Vitals reviewed.   Constitutional:       Appearance: Normal appearance.   HENT:      Head: Normocephalic and atraumatic.   Eyes:      Pupils: Pupils are equal, round, and reactive to light.   Cardiovascular:      Rate and Rhythm: Normal rate.   Pulmonary:      Effort: Pulmonary effort is normal. No respiratory distress.   Musculoskeletal:         General: Normal range of motion.      Cervical back: Normal range of motion and neck supple.   Neurological:      General: No focal deficit present.      Mental Status: She is alert and oriented to person, place, and time.   Psychiatric:         Mood and Affect: Mood normal.         Behavior: Behavior normal.         Assessment:   Post-op, the patient is struggling with increased reflux over last month.     Encounter Diagnoses   Name Primary?    Obesity, Class I, BMI 30-34.9 Yes    S/P laparoscopic sleeve gastrectomy with PEHR     Gastroesophageal reflux disease, unspecified whether esophagitis present        Plan:   Will send in Protonix for pt to take.  Advised to d/c prilosec and prevacid.  Will send pt for UGI.   Discussed with pt meeting with dietitian in next 1-2 months to discuss maintenance weight.    Encouraged patient to be sure to get plenty of lean protein per day through small frequent meals all with a protein source.   Activity restrictions: none.   Recommended patient be sure to get at least 70 grams of protein per day by eating small, frequent meals all with high lean protein choices. Be sure to limit/cut back on daily carbohydrate intake. Discussed with the patient the recommended amount of water per day to intake- half of body weight in ounces. Reviewed vitamin requirements. Be sure to do routine exercise, 150 minutes per week minimum, including both cardio and strength training.     Instructions / Recommendations:  dietary counseling recommended, recommended a daily protein intake of  grams, vitamin supplement(s) recommended, recommended exercising at least 150 minutes per week, behavior modifications recommended and instructed to call the office for concerns, questions, or problems.     The patient was instructed to follow up after UGI study .     The patient was counseled regarding. Total time spent during this encounter was 20 minutes.

## 2024-08-27 ENCOUNTER — TRANSCRIBE ORDERS (OUTPATIENT)
Dept: ADMINISTRATIVE | Facility: HOSPITAL | Age: 39
End: 2024-08-27
Payer: COMMERCIAL

## 2024-08-27 ENCOUNTER — HOSPITAL ENCOUNTER (OUTPATIENT)
Dept: GENERAL RADIOLOGY | Facility: HOSPITAL | Age: 39
Discharge: HOME | End: 2024-08-27
Admitting: PHYSICIAN ASSISTANT
Payer: COMMERCIAL

## 2024-08-27 DIAGNOSIS — Z98.84 S/P LAPAROSCOPIC SLEEVE GASTRECTOMY: ICD-10-CM

## 2024-08-27 DIAGNOSIS — K21.9 GERD WITH STRICTURE: ICD-10-CM

## 2024-08-27 DIAGNOSIS — K22.2 GERD WITH STRICTURE: ICD-10-CM

## 2024-08-27 DIAGNOSIS — E66.9 CLASS 1 OBESITY WITH BODY MASS INDEX (BMI) OF 33.0 TO 33.9 IN ADULT, UNSPECIFIED OBESITY TYPE, UNSPECIFIED WHETHER SERIOUS COMORBIDITY PRESENT: ICD-10-CM

## 2024-08-27 DIAGNOSIS — E66.9 CLASS 1 OBESITY WITH BODY MASS INDEX (BMI) OF 33.0 TO 33.9 IN ADULT, UNSPECIFIED OBESITY TYPE, UNSPECIFIED WHETHER SERIOUS COMORBIDITY PRESENT: Primary | ICD-10-CM

## 2024-08-27 PROCEDURE — 74240 X-RAY XM UPR GI TRC 1CNTRST: CPT

## 2024-08-27 RX ADMIN — BARIUM SULFATE 120 ML: 980 POWDER, FOR SUSPENSION ORAL at 11:46

## 2024-09-19 ENCOUNTER — TELEMEDICINE (OUTPATIENT)
Dept: BARIATRICS/WEIGHT MGMT | Facility: CLINIC | Age: 39
End: 2024-09-19
Payer: COMMERCIAL

## 2024-09-19 DIAGNOSIS — R53.81 MALAISE: ICD-10-CM

## 2024-09-19 DIAGNOSIS — K44.9 HIATAL HERNIA: ICD-10-CM

## 2024-09-19 DIAGNOSIS — Z86.69 HX OF MIGRAINES: ICD-10-CM

## 2024-09-19 DIAGNOSIS — K58.1 IRRITABLE BOWEL SYNDROME WITH CONSTIPATION: ICD-10-CM

## 2024-09-19 DIAGNOSIS — Z98.84 S/P LAPAROSCOPIC SLEEVE GASTRECTOMY: ICD-10-CM

## 2024-09-19 DIAGNOSIS — K21.9 GASTROESOPHAGEAL REFLUX DISEASE, UNSPECIFIED WHETHER ESOPHAGITIS PRESENT: ICD-10-CM

## 2024-09-19 DIAGNOSIS — K31.84 GASTROPARESIS: Primary | ICD-10-CM

## 2024-09-19 DIAGNOSIS — E66.3 OVERWEIGHT (BMI 25.0-29.9): ICD-10-CM

## 2024-09-19 DIAGNOSIS — Z71.3 DIETARY COUNSELING: ICD-10-CM

## 2024-09-19 PROBLEM — E66.9 OBESITY, CLASS I, BMI 30-34.9: Status: RESOLVED | Noted: 2024-01-10 | Resolved: 2024-09-19

## 2024-09-19 PROBLEM — E66.811 OBESITY, CLASS I, BMI 30-34.9: Status: RESOLVED | Noted: 2024-01-10 | Resolved: 2024-09-19

## 2024-09-19 RX ORDER — SODIUM CHLORIDE, SODIUM LACTATE, POTASSIUM CHLORIDE, CALCIUM CHLORIDE 600; 310; 30; 20 MG/100ML; MG/100ML; MG/100ML; MG/100ML
30 INJECTION, SOLUTION INTRAVENOUS CONTINUOUS
OUTPATIENT
Start: 2024-09-19

## 2024-09-19 RX ORDER — METOCLOPRAMIDE 5 MG/1
5 TABLET ORAL 2 TIMES DAILY
Qty: 120 TABLET | Refills: 0 | Status: SHIPPED | OUTPATIENT
Start: 2024-09-19

## 2025-04-07 PROBLEM — R53.81 MALAISE: Status: ACTIVE | Noted: 2024-09-19

## 2025-07-30 ENCOUNTER — TELEMEDICINE (OUTPATIENT)
Dept: BARIATRICS/WEIGHT MGMT | Facility: CLINIC | Age: 40
End: 2025-07-30
Payer: COMMERCIAL

## 2025-07-30 DIAGNOSIS — R10.84 GENERALIZED ABDOMINAL PAIN: ICD-10-CM

## 2025-07-30 DIAGNOSIS — E66.3 OVERWEIGHT (BMI 25.0-29.9): ICD-10-CM

## 2025-07-30 DIAGNOSIS — R11.0 NAUSEA: ICD-10-CM

## 2025-07-30 DIAGNOSIS — K31.84 GASTROPARESIS: ICD-10-CM

## 2025-07-30 DIAGNOSIS — K44.9 HIATAL HERNIA: ICD-10-CM

## 2025-07-30 DIAGNOSIS — K58.1 IRRITABLE BOWEL SYNDROME WITH CONSTIPATION: ICD-10-CM

## 2025-07-30 DIAGNOSIS — Z98.84 S/P LAPAROSCOPIC SLEEVE GASTRECTOMY: ICD-10-CM

## 2025-07-30 DIAGNOSIS — K21.9 GASTROESOPHAGEAL REFLUX DISEASE, UNSPECIFIED WHETHER ESOPHAGITIS PRESENT: Primary | ICD-10-CM

## 2025-07-30 DIAGNOSIS — Z71.3 DIETARY COUNSELING: ICD-10-CM

## 2025-07-30 RX ORDER — SODIUM CHLORIDE 0.9 % (FLUSH) 0.9 %
10 SYRINGE (ML) INJECTION AS NEEDED
OUTPATIENT
Start: 2025-07-30

## 2025-07-30 RX ORDER — SODIUM CHLORIDE 0.9 % (FLUSH) 0.9 %
10 SYRINGE (ML) INJECTION EVERY 12 HOURS SCHEDULED
OUTPATIENT
Start: 2025-07-30

## 2025-07-30 RX ORDER — SODIUM CHLORIDE 9 MG/ML
40 INJECTION, SOLUTION INTRAVENOUS AS NEEDED
OUTPATIENT
Start: 2025-07-30

## 2025-07-30 NOTE — PROGRESS NOTES
[facility]IATR[facility] SURGERY  [address]  [facility] [id number] [phone]  [address]  [facility] [id number] [phone]  Dept: [phone]  7/30/2025      Ellie Saini.  [id number]  [id number]  1985  female      Follow-up gastric sleeve and paraesophageal hernia.    You have chosen to receive care through a telehealth visit.  Do you consent to use a video/audio connection for your medical care today? Yes      [facility] Post-Op Bariatric Surgery:   Ellie Saini is status post Laparoscopic gastric sleeve w/ PHR procedure, performed on 2/7/24     HPI:   Today's weight is   pounds, today's BMI is 28.2 a  gain of 16 pounds since the last visit and weight loss since surgery is 51 pounds.    Ellie Saini denies fever chills chest pain shortness of air or lower extremity pain and reports heartburn, nausea and epigastric pain mainly in the evening wakes up in the middle the night.  Patient with history of gastroparesis.  Patient did have moderate-sized paraesophageal hernia repaired and now has a small recurrence.  Patient complains of epigastric type pain with or without eating and describes it as a burning type pain     Diet and Exercise: Diet history reviewed and discussed with the patient. Weight loss/gains to date discussed with the patient. The patient states they are eating around unknown grams of protein per day. She reports eating 3 meals per day, a typical portion size of 1 cup, eating 2 snacks per day, drinking 5 or more 8-oz. glasses of water per day, no carbonated beverage consumption and exercising regularly.     Supplements: Yes.     Review of Systems   Gastrointestinal:  Positive for constipation and nausea.   All other systems reviewed and are negative.        * No active hospital problems. *      Past Medical History:   Diagnosis Date    Acute bronchitis     Acute maxillary sinusitis     Acute otitis  media     Acute pharyngitis     Allergic rhinitis     Anesthesia complication     GETS MIGRAINE AFTER POST-OP    Breast lump     Cramp in limb     Cramp in lower limb - bilateral       Ear fullness, bilateral 7/11/2018    Hormone replacement therapy (HRT)     H/O: hormone replacement (HRT)    Insomnia     Lymphadenopathy     Malaise     Other malaise    Migraine     PONV (postoperative nausea and vomiting)     Postoperative visit     Sleep apnea     CPAP    Upper respiratory infection     Upper respiratory infection, acute 4/28/2017    Urinary tract infection 8/25/2017    Vertigo        The following portions of the patient's history were reviewed and updated as appropriate: allergies, current medications, past family history, past medical history, past social history, past surgical history, and problem list.    There were no vitals filed for this visit.    Physical Exam  Constitutional:       Appearance: Normal appearance.   HENT:      Head: Normocephalic and atraumatic.   Eyes:      Conjunctiva/sclera: Conjunctivae normal.   Pulmonary:      Effort: Pulmonary effort is normal.   Neurological:      Mental Status: She is alert and oriented to person, place, and time.   Psychiatric:         Mood and Affect: Mood normal.         Behavior: Behavior normal.         Thought Content: Thought content normal.         Judgment: Judgment normal.         Assessment:   Post-op, the patient status post sleeve gastrectomy and paraesophageal hernia repair February 2024 with history of gastroparesis.  Patient does have a recurrence of a small hiatal hernia per upper GI.  We will going proceed with upper endoscopy for further evaluation of her GERD type symptoms as well as epigastric abdominal pain.  Continue with PPI and Reglan which does help but still very symptomatic.  Most likely need to proceed with Camille-en-Y gastric bypass which she would prefer to do over just trying to repair the recurrent hiatal hernia.  The risks and  benefits of the procedure were discussed with the patient in detail and all questions were answered.  Possibility of perforation, bleeding, aspiration, anoxic brain injury, respiratory and/or cardiac arrest and death were discussed.  Consent will be signed and witnessed..     Encounter Diagnoses   Name Primary?    Gastroesophageal reflux disease, unspecified whether esophagitis present Yes    Gastroparesis     Hiatal hernia     Nausea     Generalized abdominal pain     Irritable bowel syndrome with constipation     Overweight (BMI 25.0-29.9)     S/P laparoscopic sleeve gastrectomy with PEHR     Dietary counseling        Plan:     Encouraged patient to be sure to get plenty of lean protein per day through small meals all with a protein source.   Activity restrictions: none.   Recommended patient be sure to get at least 70 grams of protein per day by eating small  meals all with high lean protein choices. Be sure to limit/cut back on daily carbohydrate intake. Discussed with the patient the recommended amount of water per day to intake. Reviewed vitamin requirements. Be sure to do routine exercise including strength training.    Instructed to call the office for concerns, questions, or problems.     The patient was instructed to follow up in after endoscopy.     The patient was counseled regarding the above procedure. Total time spent on this encounter was  30 minutes including reviewing previous notes, lab results and face to face time spent with the patient.  The majority of time was spent counseling the patient regarding diet and exercise as well as reviewing their medications and their compliance with the procedure.

## 2025-08-01 ENCOUNTER — TELEPHONE (OUTPATIENT)
Dept: BARIATRICS/WEIGHT MGMT | Facility: CLINIC | Age: 40
End: 2025-08-01
Payer: COMMERCIAL

## 2025-08-28 ENCOUNTER — TELEPHONE (OUTPATIENT)
Dept: BARIATRICS/WEIGHT MGMT | Facility: CLINIC | Age: 40
End: 2025-08-28

## (undated) DEVICE — SINGLE-USE BIOPSY FORCEPS: Brand: RADIAL JAW 4

## (undated) DEVICE — APL DUPLOSPRAYER MIS 40CM

## (undated) DEVICE — TRAP,MUCUS SPECIMEN,40CC: Brand: MEDLINE

## (undated) DEVICE — DECANT FLD BG 9IN STRL

## (undated) DEVICE — GLV SURG SENSICARE PI MIC PF SZ8.5 LF STRL

## (undated) DEVICE — BG PRESS INFUS W/STPCK 500CC

## (undated) DEVICE — DEV SEAL VASC ENSEAL G2 CVD ART 45CM

## (undated) DEVICE — NDL HYPO PRECISIONGLIDE REG 21G 1 1/2

## (undated) DEVICE — CONN TBG Y 5 IN 1 LF STRL

## (undated) DEVICE — SYS FILT LAP LAPAROSHIELD EVAC SMOKE STRL BX/10

## (undated) DEVICE — TROC ACC ABD KII FIOS BLADLES OPTI ADVFIX 5X100MM

## (undated) DEVICE — BITEBLOCK ENDO W/STRAP 60F A/ LF DISP

## (undated) DEVICE — GLV SURG SENSICARE POLYISPRN W/ALOE PF LF 6.5 GRN STRL

## (undated) DEVICE — PAD GRND E/S MEGADYNE MONOPLR 2/PLT W/CORD A/ DISP

## (undated) DEVICE — GLV SURG SENSICARE PI MIC PF SZ6 LF STRL

## (undated) DEVICE — CORD MONOPLR BOVIE 90D DISP

## (undated) DEVICE — BALN BOUGIE STD/TPR 38F

## (undated) DEVICE — CANN SMPL SOFTECH BIFLO ETCO2 A/M 7FT

## (undated) DEVICE — SEALANT WND FIBRIN TISSEEL PREFIL/SYR/PRIMAFZ 2ML

## (undated) DEVICE — DISSCT LAP BLNT TP/5MM SHFT/41CM STRL

## (undated) DEVICE — MSK ENDO PORT O2 POM ELITE CURAPLEX A/

## (undated) DEVICE — GLV SURG SENSICARE PI PF LF 8.5 GRN STRL

## (undated) DEVICE — TROC STANDARDTROCAR FOR/TITANSGS 19MM DISP STRL

## (undated) DEVICE — SOL NACL 0.9PCT 1000ML

## (undated) DEVICE — PK OSC LAP SLV 40